# Patient Record
Sex: FEMALE | Race: WHITE | NOT HISPANIC OR LATINO | Employment: OTHER | URBAN - NONMETROPOLITAN AREA
[De-identification: names, ages, dates, MRNs, and addresses within clinical notes are randomized per-mention and may not be internally consistent; named-entity substitution may affect disease eponyms.]

---

## 2022-05-17 ENCOUNTER — OFFICE VISIT (OUTPATIENT)
Dept: FAMILY MEDICINE CLINIC | Facility: CLINIC | Age: 62
End: 2022-05-17

## 2022-05-17 VITALS
BODY MASS INDEX: 36.37 KG/M2 | DIASTOLIC BLOOD PRESSURE: 88 MMHG | WEIGHT: 213 LBS | HEIGHT: 64 IN | SYSTOLIC BLOOD PRESSURE: 142 MMHG

## 2022-05-17 DIAGNOSIS — Z85.3 HISTORY OF BILATERAL BREAST CANCER: ICD-10-CM

## 2022-05-17 DIAGNOSIS — Z79.4 TYPE 2 DIABETES MELLITUS WITH DIABETIC NEUROPATHY, WITH LONG-TERM CURRENT USE OF INSULIN: ICD-10-CM

## 2022-05-17 DIAGNOSIS — F33.1 MODERATE EPISODE OF RECURRENT MAJOR DEPRESSIVE DISORDER: ICD-10-CM

## 2022-05-17 DIAGNOSIS — Z79.4 TYPE 2 DIABETES MELLITUS WITH OTHER DIABETIC KIDNEY COMPLICATION, WITH LONG-TERM CURRENT USE OF INSULIN: ICD-10-CM

## 2022-05-17 DIAGNOSIS — E11.40 TYPE 2 DIABETES MELLITUS WITH DIABETIC NEUROPATHY, WITH LONG-TERM CURRENT USE OF INSULIN: ICD-10-CM

## 2022-05-17 DIAGNOSIS — E11.29 TYPE 2 DIABETES MELLITUS WITH OTHER DIABETIC KIDNEY COMPLICATION, WITH LONG-TERM CURRENT USE OF INSULIN: ICD-10-CM

## 2022-05-17 DIAGNOSIS — E55.9 VITAMIN D DEFICIENCY: ICD-10-CM

## 2022-05-17 DIAGNOSIS — I10 PRIMARY HYPERTENSION: ICD-10-CM

## 2022-05-17 DIAGNOSIS — T45.1X5A CHEMOTHERAPY-INDUCED NEUROPATHY: ICD-10-CM

## 2022-05-17 DIAGNOSIS — L50.9 HIVES: ICD-10-CM

## 2022-05-17 DIAGNOSIS — G62.0 CHEMOTHERAPY-INDUCED NEUROPATHY: ICD-10-CM

## 2022-05-17 DIAGNOSIS — Z00.00 ENCOUNTER FOR PREVENTATIVE ADULT HEALTH CARE EXAMINATION: Primary | ICD-10-CM

## 2022-05-17 DIAGNOSIS — E78.2 MIXED HYPERLIPIDEMIA: ICD-10-CM

## 2022-05-17 PROBLEM — E11.9 DIABETES MELLITUS: Status: ACTIVE | Noted: 2022-05-17

## 2022-05-17 PROCEDURE — 99396 PREV VISIT EST AGE 40-64: CPT | Performed by: FAMILY MEDICINE

## 2022-05-17 PROCEDURE — 36415 COLL VENOUS BLD VENIPUNCTURE: CPT | Performed by: FAMILY MEDICINE

## 2022-05-17 RX ORDER — ERGOCALCIFEROL 1.25 MG/1
50000 CAPSULE ORAL 2 TIMES WEEKLY
COMMUNITY
Start: 2022-04-27

## 2022-05-17 RX ORDER — DULAGLUTIDE 3 MG/.5ML
INJECTION, SOLUTION SUBCUTANEOUS
COMMUNITY
Start: 2022-03-28 | End: 2022-05-17

## 2022-05-17 RX ORDER — GABAPENTIN 400 MG/1
400 CAPSULE ORAL
COMMUNITY
Start: 2022-04-29 | End: 2022-05-24 | Stop reason: SDUPTHER

## 2022-05-17 RX ORDER — BLOOD SUGAR DIAGNOSTIC
STRIP MISCELLANEOUS
COMMUNITY
Start: 2022-04-29 | End: 2022-10-19 | Stop reason: SDUPTHER

## 2022-05-17 RX ORDER — DIAPER,BRIEF,INFANT-TODD,DISP
1 EACH MISCELLANEOUS 2 TIMES DAILY
Qty: 3 G | Refills: 0 | Status: SHIPPED | OUTPATIENT
Start: 2022-05-17 | End: 2022-08-24

## 2022-05-17 RX ORDER — EMPAGLIFLOZIN 25 MG/1
25 TABLET, FILM COATED ORAL DAILY
COMMUNITY
Start: 2022-04-29 | End: 2022-08-24

## 2022-05-17 RX ORDER — CYCLOBENZAPRINE HCL 10 MG
10 TABLET ORAL
COMMUNITY
Start: 2022-04-29 | End: 2022-07-05 | Stop reason: SDUPTHER

## 2022-05-17 RX ORDER — SEMAGLUTIDE 1.34 MG/ML
INJECTION, SOLUTION SUBCUTANEOUS
COMMUNITY
Start: 2022-05-04 | End: 2022-07-05 | Stop reason: SDUPTHER

## 2022-05-17 RX ORDER — PAROXETINE HYDROCHLORIDE 40 MG/1
40 TABLET, FILM COATED ORAL DAILY
COMMUNITY
Start: 2022-04-29 | End: 2022-07-05 | Stop reason: SDUPTHER

## 2022-05-17 RX ORDER — INSULIN DETEMIR 100 [IU]/ML
INJECTION, SOLUTION SUBCUTANEOUS
COMMUNITY
Start: 2022-04-29 | End: 2022-07-05 | Stop reason: SDUPTHER

## 2022-05-17 RX ORDER — CLONIDINE HYDROCHLORIDE 0.1 MG/1
TABLET ORAL
COMMUNITY
Start: 2022-04-29 | End: 2022-07-05 | Stop reason: SDUPTHER

## 2022-05-17 RX ORDER — CETIRIZINE HYDROCHLORIDE 10 MG/1
10 TABLET ORAL DAILY
Qty: 30 TABLET | Refills: 0 | Status: SHIPPED | OUTPATIENT
Start: 2022-05-17 | End: 2022-06-14

## 2022-05-17 RX ORDER — LANCETS 30 GAUGE
EACH MISCELLANEOUS
COMMUNITY
Start: 2022-04-29

## 2022-05-17 RX ORDER — AMLODIPINE BESYLATE AND BENAZEPRIL HYDROCHLORIDE 5; 40 MG/1; MG/1
CAPSULE ORAL
COMMUNITY
Start: 2022-04-29 | End: 2022-08-17 | Stop reason: SDUPTHER

## 2022-05-17 RX ORDER — PEN NEEDLE, DIABETIC 32GX 5/32"
NEEDLE, DISPOSABLE MISCELLANEOUS 2 TIMES DAILY
COMMUNITY
Start: 2022-03-27 | End: 2022-10-19 | Stop reason: SINTOL

## 2022-05-17 RX ORDER — INSULIN GLARGINE 100 [IU]/ML
INJECTION, SOLUTION SUBCUTANEOUS
COMMUNITY
Start: 2022-04-29 | End: 2022-05-17

## 2022-05-17 RX ORDER — EZETIMIBE 10 MG/1
10 TABLET ORAL
COMMUNITY
Start: 2022-04-29 | End: 2023-01-30 | Stop reason: SDUPTHER

## 2022-05-17 RX ORDER — CARBAMAZEPINE 200 MG/1
TABLET ORAL
COMMUNITY
Start: 2022-04-29 | End: 2022-07-05 | Stop reason: SDUPTHER

## 2022-05-17 RX ORDER — ATORVASTATIN CALCIUM 80 MG/1
80 TABLET, FILM COATED ORAL DAILY
COMMUNITY
Start: 2022-04-29 | End: 2022-10-07 | Stop reason: SDUPTHER

## 2022-05-17 NOTE — PROGRESS NOTES
"Chief Complaint  Annual Exam and hives    Subjective          Tara Perez presents to [facility] for preventative yearly exam.   Patient is a 61-year-old female who presents to \[facility]\"" care.    She had a history of breast cancer about 7 years ago had a double mastectomy status post chemo.  Due to this she had chemo induced neuropathy along with diabetic neuropathy for which she is on the carbamazepine plus the gabapentin.    In regards to her type 2 diabetes she has had to have her insulin changed over currently on Levemir 30 units and 60 units at night.  She is also on the Ozempic along with Jardiance.  Her last A1c was above 8.  Daily blood glucose around 180.    She also states over the past week she has had a rash that broke on the left upper extremity denies any new medications new contacts no detergents or soaps no other known triggers.  The rash has been treated with over-the-counter medications with no improvement.    She has a history of essential hypertension stable on medications no history of cardiac abnormalities never requiring echo or stress test no chest pain or shortness of breath.      Objective   Vital Signs:   /88   Ht 162.6 cm (64\")   Wt 96.6 kg (213 lb)   BMI 36.56 kg/m²     Body mass index is 36.56 kg/m².    Predictive Model Details   No score data available for Risk of Fall        PHQ-9 Depression Screening  Little interest or pleasure in doing things? 0-->not at all   Feeling down, depressed, or hopeless? 0-->not at all   Trouble falling or staying asleep, or sleeping too much? 0-->not at all   Feeling tired or having little energy? 0-->not at all   Poor appetite or overeating? 0-->not at all   Feeling bad about yourself - or that you are a failure or have let yourself or your family down? 0-->not at all   Trouble concentrating on things, such as reading the newspaper or watching television? 0-->not at all   Moving or speaking so slowly that other " people could have noticed? Or the opposite - being so fidgety or restless that you have been moving around a lot more than usual? 0-->not at all   Thoughts that you would be better off dead, or of hurting yourself in some way? 0-->not at all   PHQ-9 Total Score 0   If you checked off any problems, how difficult have these problems made it for you to do your work, take care of things at home, or get along with other people? not difficult at all     Immunization History   Administered Date(s) Administered   • COVID-19 (MODERNA) 1st, 2nd, 3rd Dose Only 04/20/2021, 05/18/2021, 11/03/2021, 04/01/2022       Review of Systems   Constitutional: Negative.    HENT: Negative.    Eyes: Negative.    Respiratory: Negative.    Cardiovascular: Negative.    Gastrointestinal: Negative.    Endocrine: Negative.    Genitourinary: Negative.    Musculoskeletal: Negative.    Skin: Positive for rash.   Allergic/Immunologic: Negative.    Neurological: Negative.    Hematological: Negative.    Psychiatric/Behavioral: Negative.        Past History:  Medical History: has no past medical history on file.   Surgical History: has no past surgical history on file.   Family History: family history is not on file.   Social History: reports that she quit smoking about 30 years ago. She started smoking about 47 years ago. She has a 17.00 pack-year smoking history. She does not have any smokeless tobacco history on file. She reports previous alcohol use. She reports that she does not use drugs.      Current Outpatient Medications:   •  amLODIPine-benazepril (LOTREL) 5-40 MG per capsule, TAKE 1 CAPSULE BY MOUTH ONCE DAILY FOR BLOOD PRESSURE, Disp: , Rfl:   •  atorvastatin (LIPITOR) 80 MG tablet, Take 80 mg by mouth Daily., Disp: , Rfl:   •  BD Pen Needle Lisa U/F 32G X 4 MM misc, 2 (Two) Times a Day. as directed, Disp: , Rfl:   •  carBAMazepine (TEGretol) 200 MG tablet, TAKE 1 & 1/2 TABLETS BY MOUTH TWICE DAILY, Disp: , Rfl:   •  cloNIDine (CATAPRES) 0.1  MG tablet, TAKE 1 TABLET BY MOUTH EVERY NIGHT AT BEDTIME FOR HOT FLASHED, Disp: , Rfl:   •  cyclobenzaprine (FLEXERIL) 10 MG tablet, Take 10 mg by mouth every night at bedtime., Disp: , Rfl:   •  Easy Touch Lancets 30G/Twist misc, USE AS DIRECTED TO TEST BLOOD SUGAR 3 TIMES DAILY, Disp: , Rfl:   •  ezetimibe (ZETIA) 10 MG tablet, Take 10 mg by mouth every night at bedtime., Disp: , Rfl:   •  gabapentin (NEURONTIN) 400 MG capsule, Take 400 mg by mouth every night at bedtime., Disp: , Rfl:   •  Levemir FlexTouch 100 UNIT/ML injection, INJECT 30 UNITS SUBCUTANEOUSLY EVERY MORNING AND 60 UNITS EVERY EVENING, Disp: , Rfl:   •  OneTouch Ultra test strip, TEST BLOOD SUGAR 3 TIMES DAILY, Disp: , Rfl:   •  Ozempic, 1 MG/DOSE, 4 MG/3ML solution pen-injector, INJECT 1MG SUBCUTANEOUSLY EVERY WEEK, Disp: , Rfl:   •  PARoxetine (PAXIL) 40 MG tablet, Take 40 mg by mouth Daily., Disp: , Rfl:   •  vitamin D (ERGOCALCIFEROL) 1.25 MG (93193 UT) capsule capsule, Take 50,000 Units by mouth 2 (Two) Times a Week., Disp: , Rfl:   •  cetirizine (zyrTEC) 10 MG tablet, Take 1 tablet by mouth Daily., Disp: 30 tablet, Rfl: 0  •  empagliflozin (Jardiance) 25 MG tablet tablet, Take 25 mg by mouth Daily., Disp: , Rfl:   •  hydrocortisone 0.5 % ointment, Apply 1 application topically to the appropriate area as directed 2 (Two) Times a Day., Disp: 3 g, Rfl: 0  •  Jardiance 25 MG tablet tablet, Take 25 mg by mouth Daily., Disp: , Rfl:     Allergies: Patient has no known allergies.    Physical Exam  Constitutional:       Appearance: Normal appearance. She is normal weight.   HENT:      Head: Normocephalic and atraumatic.   Eyes:      Conjunctiva/sclera: Conjunctivae normal.   Cardiovascular:      Rate and Rhythm: Normal rate and regular rhythm.   Pulmonary:      Effort: Pulmonary effort is normal.      Breath sounds: Normal breath sounds.   Musculoskeletal:         General: Normal range of motion.      Cervical back: Normal range of motion and neck  supple.   Skin:     Comments: There is a dry scattered erythematous contact dermatitis appearing rash on the left upper extremity into the left back no evidence of drainage or pustules.   Neurological:      General: No focal deficit present.      Mental Status: She is alert and oriented to person, place, and time. Mental status is at baseline.   Psychiatric:         Mood and Affect: Mood normal.         Behavior: Behavior normal.         Thought Content: Thought content normal.         Judgment: Judgment normal.          Result Review :              Counseling/anticipatory guidance:  Patient has been counseled on nutrition, Family planning/contraception, physical activity, health and weight, injury prevention, misuse of tobacco, alcohol and drugs, sexual behavior, dental health, mental health, immunizations and screening.  Patient has been given counseling and guidance on the importance of breast cancer and self breast exams.     Assessment and Plan    Diagnoses and all orders for this visit:    1. Encounter for preventative adult health care examination (Primary)  -     Hemoglobin A1c; Future  -     Comprehensive Metabolic Panel; Future  -     CK; Future  -     CBC Auto Differential; Future  -     TSH; Future  -     T4, Free; Future  -     Lipid Panel; Future  -     Vitamin D 25 Hydroxy; Future  -     Vitamin D 25 Hydroxy  -     Lipid Panel  -     T4, Free  -     TSH  -     CBC Auto Differential  -     CK  -     Comprehensive Metabolic Panel  -     Hemoglobin A1c  We will get basic blood work and follow-up she denies any colonoscopy or Cologuard.      2. Primary hypertension  Stable continue meds and monitoring    3. Moderate episode of recurrent major depressive disorder (HCC)  Stable continue meds and monitoring    4. Vitamin D deficiency  -     Vitamin D 25 Hydroxy; Future  -     Vitamin D 25 Hydroxy  Stable continue meds and monitoring    5. Mixed hyperlipidemia  We will get basic lab work and follow-up    6.  Type 2 diabetes mellitus with other diabetic kidney complication, with long-term current use of insulin (HCC)  Stable continue meds monitoring  We will get hemoglobin A1c and follow-up next week modify her regimen based on her results    7. Type 2 diabetes mellitus with diabetic neuropathy, with long-term current use of insulin (HCC)  Stable on the gabapentin and carbamazepine    8. Hives  Appears to be hives we will go ahead and do antihistamine with Zyrtec and topical hydrocortisone cream we will try to avoid prednisone due to her history of diabetes we will have her continue to monitor of course if it gets worse return for evaluation    9. History of bilateral breast cancer  -     Ambulatory Referral to Hematology / Oncology  She just moved from [place] needs to get reestablished with an oncologist for her history of breast cancer we will make referral    10. Chemotherapy-induced neuropathy (HCC)    Other orders  -     cetirizine (zyrTEC) 10 MG tablet; Take 1 tablet by mouth Daily.  Dispense: 30 tablet; Refill: 0  -     hydrocortisone 0.5 % ointment; Apply 1 application topically to the appropriate area as directed 2 (Two) Times a Day.  Dispense: 3 g; Refill: 0        MEDICATION SIDE EFFECTS, RISKS AND SIDE EFFECTS HAVE BEEN DISCUSSED WITH PATIENT. PATIENT NOTES UNDERSTANDING. IF ANY CONCERN OR QUESTION REGARDING MEDICATION PLEASE CONTACT.         Follow Up   No follow-ups on file.  Patient was given instructions and counseling regarding her condition or for health maintenance advice. Please see specific information pulled into the AVS if appropriate.     Naomi Sandy DO

## 2022-05-18 LAB
25(OH)D3+25(OH)D2 SERPL-MCNC: 74.4 NG/ML (ref 30–100)
ALBUMIN SERPL-MCNC: 4.3 G/DL (ref 3.8–4.9)
ALBUMIN/GLOB SERPL: 1.6 {RATIO} (ref 1.2–2.2)
ALP SERPL-CCNC: 112 IU/L (ref 44–121)
ALT SERPL-CCNC: 12 IU/L (ref 0–32)
AST SERPL-CCNC: 23 IU/L (ref 0–40)
BASOPHILS # BLD AUTO: 0.1 X10E3/UL (ref 0–0.2)
BASOPHILS NFR BLD AUTO: 1 %
BILIRUB SERPL-MCNC: 0.2 MG/DL (ref 0–1.2)
BUN SERPL-MCNC: 6 MG/DL (ref 8–27)
BUN/CREAT SERPL: 8 (ref 12–28)
CALCIUM SERPL-MCNC: 8.7 MG/DL (ref 8.7–10.3)
CHLORIDE SERPL-SCNC: 102 MMOL/L (ref 96–106)
CHOLEST SERPL-MCNC: 153 MG/DL (ref 100–199)
CK SERPL-CCNC: 115 U/L (ref 32–182)
CO2 SERPL-SCNC: 23 MMOL/L (ref 20–29)
CREAT SERPL-MCNC: 0.77 MG/DL (ref 0.57–1)
EGFRCR SERPLBLD CKD-EPI 2021: 88 ML/MIN/1.73
EOSINOPHIL # BLD AUTO: 0.5 X10E3/UL (ref 0–0.4)
EOSINOPHIL NFR BLD AUTO: 10 %
ERYTHROCYTE [DISTWIDTH] IN BLOOD BY AUTOMATED COUNT: 13 % (ref 11.7–15.4)
GLOBULIN SER CALC-MCNC: 2.7 G/DL (ref 1.5–4.5)
GLUCOSE SERPL-MCNC: 95 MG/DL (ref 65–99)
HBA1C MFR BLD: 7.3 % (ref 4.8–5.6)
HCT VFR BLD AUTO: 43.2 % (ref 34–46.6)
HDLC SERPL-MCNC: 51 MG/DL
HGB BLD-MCNC: 14.3 G/DL (ref 11.1–15.9)
IMM GRANULOCYTES # BLD AUTO: 0 X10E3/UL (ref 0–0.1)
IMM GRANULOCYTES NFR BLD AUTO: 0 %
LDLC SERPL CALC-MCNC: 77 MG/DL (ref 0–99)
LYMPHOCYTES # BLD AUTO: 1.8 X10E3/UL (ref 0.7–3.1)
LYMPHOCYTES NFR BLD AUTO: 35 %
MCH RBC QN AUTO: 30.6 PG (ref 26.6–33)
MCHC RBC AUTO-ENTMCNC: 33.1 G/DL (ref 31.5–35.7)
MCV RBC AUTO: 92 FL (ref 79–97)
MONOCYTES # BLD AUTO: 0.3 X10E3/UL (ref 0.1–0.9)
MONOCYTES NFR BLD AUTO: 6 %
NEUTROPHILS # BLD AUTO: 2.5 X10E3/UL (ref 1.4–7)
NEUTROPHILS NFR BLD AUTO: 48 %
PLATELET # BLD AUTO: 231 X10E3/UL (ref 150–450)
POTASSIUM SERPL-SCNC: 3.7 MMOL/L (ref 3.5–5.2)
PROT SERPL-MCNC: 7 G/DL (ref 6–8.5)
RBC # BLD AUTO: 4.68 X10E6/UL (ref 3.77–5.28)
SODIUM SERPL-SCNC: 140 MMOL/L (ref 134–144)
T4 FREE SERPL-MCNC: 0.68 NG/DL (ref 0.82–1.77)
TRIGL SERPL-MCNC: 147 MG/DL (ref 0–149)
TSH SERPL DL<=0.005 MIU/L-ACNC: 0.8 UIU/ML (ref 0.45–4.5)
VLDLC SERPL CALC-MCNC: 25 MG/DL (ref 5–40)
WBC # BLD AUTO: 5.2 X10E3/UL (ref 3.4–10.8)

## 2022-05-24 ENCOUNTER — OFFICE VISIT (OUTPATIENT)
Dept: FAMILY MEDICINE CLINIC | Facility: CLINIC | Age: 62
End: 2022-05-24

## 2022-05-24 VITALS
WEIGHT: 206.9 LBS | DIASTOLIC BLOOD PRESSURE: 90 MMHG | SYSTOLIC BLOOD PRESSURE: 138 MMHG | BODY MASS INDEX: 35.32 KG/M2 | HEIGHT: 64 IN | OXYGEN SATURATION: 97 % | HEART RATE: 83 BPM

## 2022-05-24 DIAGNOSIS — T45.1X5A CHEMOTHERAPY-INDUCED NEUROPATHY: ICD-10-CM

## 2022-05-24 DIAGNOSIS — G62.0 CHEMOTHERAPY-INDUCED NEUROPATHY: ICD-10-CM

## 2022-05-24 DIAGNOSIS — Z85.3 HISTORY OF BILATERAL BREAST CANCER: ICD-10-CM

## 2022-05-24 DIAGNOSIS — E11.40 TYPE 2 DIABETES MELLITUS WITH DIABETIC NEUROPATHY, WITH LONG-TERM CURRENT USE OF INSULIN: Primary | ICD-10-CM

## 2022-05-24 DIAGNOSIS — Z79.4 TYPE 2 DIABETES MELLITUS WITH DIABETIC NEUROPATHY, WITH LONG-TERM CURRENT USE OF INSULIN: Primary | ICD-10-CM

## 2022-05-24 PROCEDURE — 99213 OFFICE O/P EST LOW 20 MIN: CPT | Performed by: FAMILY MEDICINE

## 2022-05-24 RX ORDER — HYDROCORTISONE 1 G/100G
CREAM TOPICAL
COMMUNITY
Start: 2022-05-17 | End: 2022-08-24

## 2022-05-24 RX ORDER — GABAPENTIN 400 MG/1
400 CAPSULE ORAL
Qty: 30 CAPSULE | Refills: 2 | Status: SHIPPED | OUTPATIENT
Start: 2022-05-24 | End: 2022-09-09

## 2022-05-24 NOTE — PROGRESS NOTES
"Chief Complaint  Med Refill    Subjective          Tara Perez presents to [facility]  Patient is a 61-year-old female with past medical history of type 2 insulin-dependent diabetes follow-up on blood work prior to this blood work she noted her hemoglobin A1c was up to 8.  Her current regimen is 60 units of Levemir at night and 30 units in the daytime along with Jardiance and Ozempic.  Blood glucose checks average around 1 50-1 60.  She is stable on her gabapentin for her diabetic neuropathy she also has chemotherapy-induced neuropathy for her history of breast cancer.      Objective   Vital Signs:   /90 (BP Location: Right arm, Patient Position: Sitting, Cuff Size: Large Adult)   Pulse 83   Ht 162.6 cm (64\")   Wt 93.8 kg (206 lb 14.4 oz)   SpO2 97%   BMI 35.51 kg/m²     Body mass index is 35.51 kg/m².    Review of Systems   Constitutional: Negative.    HENT: Negative.    Eyes: Negative.    Respiratory: Negative.    Cardiovascular: Negative.    Gastrointestinal: Negative.    Endocrine: Negative.    Genitourinary: Negative.    Musculoskeletal: Negative.    Skin: Negative.    Allergic/Immunologic: Negative.    Neurological: Negative.    Hematological: Negative.    Psychiatric/Behavioral: Negative.        Past History:  Medical History: has no past medical history on file.   Surgical History: has no past surgical history on file.   Family History: family history is not on file.   Social History: reports that she quit smoking about 30 years ago. Her smoking use included cigarettes. She started smoking about 47 years ago. She has a 17.00 pack-year smoking history. She has never used smokeless tobacco. She reports previous alcohol use. She reports that she does not use drugs.      Current Outpatient Medications:   •  amLODIPine-benazepril (LOTREL) 5-40 MG per capsule, TAKE 1 CAPSULE BY MOUTH ONCE DAILY FOR BLOOD PRESSURE, Disp: , Rfl:   •  atorvastatin (LIPITOR) 80 MG tablet, " Take 80 mg by mouth Daily., Disp: , Rfl:   •  BD Pen Needle Lisa U/F 32G X 4 MM misc, 2 (Two) Times a Day. as directed, Disp: , Rfl:   •  carBAMazepine (TEGretol) 200 MG tablet, TAKE 1 & 1/2 TABLETS BY MOUTH TWICE DAILY, Disp: , Rfl:   •  cetirizine (zyrTEC) 10 MG tablet, Take 1 tablet by mouth Daily., Disp: 30 tablet, Rfl: 0  •  cloNIDine (CATAPRES) 0.1 MG tablet, TAKE 1 TABLET BY MOUTH EVERY NIGHT AT BEDTIME FOR HOT FLASHED, Disp: , Rfl:   •  cyclobenzaprine (FLEXERIL) 10 MG tablet, Take 10 mg by mouth every night at bedtime., Disp: , Rfl:   •  Easy Touch Lancets 30G/Twist misc, USE AS DIRECTED TO TEST BLOOD SUGAR 3 TIMES DAILY, Disp: , Rfl:   •  empagliflozin (JARDIANCE) 25 MG tablet tablet, Take 25 mg by mouth Daily., Disp: , Rfl:   •  ezetimibe (ZETIA) 10 MG tablet, Take 10 mg by mouth every night at bedtime., Disp: , Rfl:   •  gabapentin (NEURONTIN) 400 MG capsule, Take 1 capsule by mouth every night at bedtime., Disp: 30 capsule, Rfl: 2  •  HM Hydrocortisone Plus 1 % cream, , Disp: , Rfl:   •  hydrocortisone 0.5 % ointment, Apply 1 application topically to the appropriate area as directed 2 (Two) Times a Day., Disp: 3 g, Rfl: 0  •  Jardiance 25 MG tablet tablet, Take 25 mg by mouth Daily., Disp: , Rfl:   •  Levemir FlexTouch 100 UNIT/ML injection, INJECT 30 UNITS SUBCUTANEOUSLY EVERY MORNING AND 60 UNITS EVERY EVENING, Disp: , Rfl:   •  OneTouch Ultra test strip, TEST BLOOD SUGAR 3 TIMES DAILY, Disp: , Rfl:   •  Ozempic, 1 MG/DOSE, 4 MG/3ML solution pen-injector, INJECT 1MG SUBCUTANEOUSLY EVERY WEEK, Disp: , Rfl:   •  PARoxetine (PAXIL) 40 MG tablet, Take 40 mg by mouth Daily., Disp: , Rfl:   •  vitamin D (ERGOCALCIFEROL) 1.25 MG (31433 UT) capsule capsule, Take 50,000 Units by mouth 2 (Two) Times a Week., Disp: , Rfl:     Allergies: Penicillins    Physical Exam  Constitutional:       Appearance: Normal appearance. She is normal weight.   HENT:      Head: Normocephalic and atraumatic.   Eyes:       Conjunctiva/sclera: Conjunctivae normal.   Cardiovascular:      Rate and Rhythm: Normal rate and regular rhythm.   Pulmonary:      Effort: Pulmonary effort is normal.      Breath sounds: Normal breath sounds.   Musculoskeletal:         General: Normal range of motion.      Cervical back: Normal range of motion and neck supple.   Skin:     General: Skin is warm and dry.   Neurological:      General: No focal deficit present.      Mental Status: She is alert and oriented to person, place, and time. Mental status is at baseline.   Psychiatric:         Mood and Affect: Mood normal.         Behavior: Behavior normal.         Thought Content: Thought content normal.         Judgment: Judgment normal.          Result Review :                   Assessment and Plan    Diagnoses and all orders for this visit:    1. Type 2 diabetes mellitus with diabetic neuropathy, with long-term current use of insulin ([facility]) (Primary)  Her hemoglobin A1c is great at 7.3 has come down from 8 we will have her continue her current insulin regimen along with the Ozempic and Jardiance have her come back in 3 months of course recommend dietary changes and exercise she is stable on her current dose of the gabapentin up-to-date [facility] contract follow-up in 3 months      2. History of bilateral breast cancer  Pending referral to get established with an oncologist here    MEDICATION SIDE EFFECTS, RISKS AND SIDE EFFECTS HAVE BEEN DISCUSSED WITH PATIENT. PATIENT NOTES UNDERSTANDING. IF ANY CONCERN OR QUESTION REGARDING MEDICATION PLEASE CONTACT.         Follow Up   No follow-ups on file.  Patient was given instructions and counseling regarding her condition or for health maintenance advice. Please see specific information pulled into the AVS if appropriate.     Naomi Sandy DO

## 2022-06-14 RX ORDER — CETIRIZINE HYDROCHLORIDE 10 MG/1
TABLET ORAL
Qty: 30 TABLET | Refills: 0 | Status: SHIPPED | OUTPATIENT
Start: 2022-06-14 | End: 2022-06-30

## 2022-06-14 NOTE — TELEPHONE ENCOUNTER
Rx Refill Note    Requested Prescriptions     Pending Prescriptions Disp Refills   • cetirizine (zyrTEC) 10 MG tablet [Pharmacy Med Name: CETIRIZINE HCL 10 MG TABLET] 30 tablet 0     Sig: TAKE ONE TABLET BY MOUTH EVERY DAY        Last office visit with prescribing clinician: 5/24/2022      Next office visit with prescribing clinician: 8/24/2022   Last labs:   Last refill: 05/17/22   Pharmacy (be sure to add in Epic). correct

## 2022-06-30 DIAGNOSIS — J30.9 ALLERGIC RHINITIS, UNSPECIFIED SEASONALITY, UNSPECIFIED TRIGGER: Primary | ICD-10-CM

## 2022-07-01 DIAGNOSIS — T14.8XXA MUSCLE STRAIN: ICD-10-CM

## 2022-07-01 DIAGNOSIS — E11.65 TYPE 2 DIABETES MELLITUS WITH HYPERGLYCEMIA, WITHOUT LONG-TERM CURRENT USE OF INSULIN: ICD-10-CM

## 2022-07-01 DIAGNOSIS — F33.41 MAJOR DEPRESSIVE DISORDER, RECURRENT EPISODE, IN PARTIAL REMISSION: Primary | ICD-10-CM

## 2022-07-01 DIAGNOSIS — I10 ESSENTIAL HYPERTENSION, BENIGN: ICD-10-CM

## 2022-07-01 NOTE — TELEPHONE ENCOUNTER
Patient is requesting a refill for:    Carbamazepine 200 mg  Paroxetine 40 mg  Clonidine 0.1 mg  Insulin pens  Cyclobenzaprine 10 mg    Please send to [facility].    Patient says she is out of most of these.  Does she need an appt to get refills since she was just here in May?   She has a 3 mo f/u scheduled already.

## 2022-07-05 RX ORDER — CETIRIZINE HYDROCHLORIDE 10 MG/1
TABLET ORAL
Qty: 30 TABLET | Refills: 1 | Status: SHIPPED | OUTPATIENT
Start: 2022-07-05 | End: 2022-08-30

## 2022-07-12 ENCOUNTER — CONSULT (OUTPATIENT)
Dept: ONCOLOGY | Facility: CLINIC | Age: 62
End: 2022-07-12

## 2022-07-12 VITALS
BODY MASS INDEX: 33.46 KG/M2 | HEIGHT: 64 IN | SYSTOLIC BLOOD PRESSURE: 119 MMHG | WEIGHT: 196 LBS | HEART RATE: 90 BPM | RESPIRATION RATE: 18 BRPM | TEMPERATURE: 97.8 F | OXYGEN SATURATION: 100 % | DIASTOLIC BLOOD PRESSURE: 72 MMHG

## 2022-07-12 DIAGNOSIS — Z85.3 HISTORY OF BILATERAL BREAST CANCER: Primary | ICD-10-CM

## 2022-07-12 PROCEDURE — 99204 OFFICE O/P NEW MOD 45 MIN: CPT | Performed by: INTERNAL MEDICINE

## 2022-07-12 NOTE — PROGRESS NOTES
CHIEF COMPLAINT: No new somatic complaints    REASON FOR REFERRAL: Nothing to suggest metastasis clinically      RECORDS OBTAINED  Records of the patients history including those obtained from prior medical oncologist records were reviewed and summarized in detail.    Oncology/Hematology History Overview Note   1.  History of bilateral breast cancer  2.  Hypertension  3.  Diabetes  4.  Hyperlipidemia  5.  Depression    Oncology history timeline:    -11/23/2021 office note from Dr.Vidya Zaidi indicates right breast T1BN0 low-grade ductal carcinoma estrogen receptor positive on Femara.  Left breast 12:00 and 7:00 hormone receptor positive T1CN0 adenocarcinoma.  Bilateral mastectomy.  Received dose dense AC followed by Taxol ending December 2014.  HER2/chitra negative.  ER and KS positive.  Hot flashes managed with clonidine 0.1 mg nightly.  Intolerant of aromatase inhibitor.  Plan for annual follow-up CBC, CMP and clinical exam.  -5/17/2022 CMP and CBC unremarkable    -7/12/2022 [facility] hematology oncology initial consultation: I saw her for the first time today.    I reviewed 5/20/2014 biopsy pathology report which reveals...  ·  right breast low-grade invasive ductal carcinoma grade 1 Stanley Osman 5 out of 9.  ER 3+99%, KS 3+ 99%, HER2/chitra 1+ negative, Ki-67 borderline 3+12%.   ·  Left breast mass 7:00 invasive ductal grade 2, [place] score 6 out of 9.  ER 3+100%, KS 13+ 100%, HER2/chitra equivocal negative on FISH.    · Left breast 9:00 low-grade invasive ductal carcinoma grade 1 [place] score 5 out of 9.  ER 3+90% KS 3+100%   HER2/chitra negative    I reviewed her 6/5/2014 bilateral mastectomy pathology report...  · right breast simple mastectomy showed intermediate grade invasive ductal carcinoma 1 cm grade 2 score 6 out of 9.  1 sentinel node negative.  No lymph vascular invasion.  Pathologic T1BN0  · Left breast mastectomy 2 foci.  1.5 cm and 1.2 cm.  Grade 2.  Score 6 out of 9.  Margins negative.  1  sentinel node and 2 additional nodes taken all negative.  Pathologic T1CN0   Her CBC and CMP from May were unremarkable.  She confirms that she received dose dense AC followed by Taxol and then approximately 6 years of Femara which she quit in 2021 and now lives in [place].  She does not recall having had Oncotype.  Bilateral chest wall exam today is benign.  Routine follow-up at this point can be bilateral chest wall clinical examination and perhaps a CBC to look for late complications of chemotherapy including secondary malignancies.  This does not necessarily have to be done by a medical oncologist as a primary care doctor can examine her chest wall as a part of their routine physical and they can do her labs but she would desire to continue to follow with us as she was doing with her prior medical oncologist.  As breast cancer was multifocal in the left breast and present in the contralateral breast, I will offer her genetic counseling though obviously she has already had bilateral mastectomies.  With small node-negative disease I would not put her through breast cancer index testing and would not give her extended adjuvant hormone blockade.  She has been off of hormone blockade since the latter part of 2021.     History of bilateral breast cancer       HISTORY OF PRESENT ILLNESS:  The patient is a 61 y.o.  female, referred for follow-up of history of bilateral breast cancer.    REVIEW OF SYSTEMS:  No somatic complaints to suggest metastasis    History reviewed. No pertinent past medical history.  History reviewed. No pertinent surgical history.    Current Outpatient Medications on File Prior to Visit   Medication Sig Dispense Refill   • amLODIPine-benazepril (LOTREL) 5-40 MG per capsule TAKE 1 CAPSULE BY MOUTH ONCE DAILY FOR BLOOD PRESSURE     • atorvastatin (LIPITOR) 80 MG tablet Take 80 mg by mouth Daily.     • BD Pen Needle Lisa U/F 32G X 4 MM misc 2 (Two) Times a Day. as directed     • carBAMazepine  (TEGretol) 200 MG tablet TAKE 1 & 1/2 TABLETS BY MOUTH TWICE DAILY     • cetirizine (zyrTEC) 10 MG tablet TAKE ONE TABLET BY MOUTH EVERY DAY 30 tablet 1   • cloNIDine (CATAPRES) 0.1 MG tablet TAKE 1 TABLET BY MOUTH EVERY NIGHT AT BEDTIME FOR HOT FLASHED     • cyclobenzaprine (FLEXERIL) 10 MG tablet Take 10 mg by mouth every night at bedtime.     • Easy Touch Lancets 30G/Twist misc USE AS DIRECTED TO TEST BLOOD SUGAR 3 TIMES DAILY     • empagliflozin (JARDIANCE) 25 MG tablet tablet Take 25 mg by mouth Daily.     • ezetimibe (ZETIA) 10 MG tablet Take 10 mg by mouth every night at bedtime.     • gabapentin (NEURONTIN) 400 MG capsule Take 1 capsule by mouth every night at bedtime. 30 capsule 2   • HM Hydrocortisone Plus 1 % cream      • hydrocortisone 0.5 % ointment Apply 1 application topically to the appropriate area as directed 2 (Two) Times a Day. 3 g 0   • Jardiance 25 MG tablet tablet Take 25 mg by mouth Daily.     • Levemir FlexTouch 100 UNIT/ML injection INJECT 30 UNITS SUBCUTANEOUSLY EVERY MORNING AND 60 UNITS EVERY EVENING     • OneTouch Ultra test strip TEST BLOOD SUGAR 3 TIMES DAILY     • Ozempic, 1 MG/DOSE, 4 MG/3ML solution pen-injector INJECT 1MG SUBCUTANEOUSLY EVERY WEEK     • PARoxetine (PAXIL) 40 MG tablet Take 40 mg by mouth Daily.     • vitamin D (ERGOCALCIFEROL) 1.25 MG (12559 UT) capsule capsule Take 50,000 Units by mouth 2 (Two) Times a Week.       No current facility-administered medications on file prior to visit.       Allergies   Allergen Reactions   • Penicillins Rash       Social History     Socioeconomic History   • Marital status:    Tobacco Use   • Smoking status: Former Smoker     Packs/day: 1.00     Years: 17.00     Pack years: 17.00     Types: Cigarettes     Start date:      Quit date:      Years since quittin.5   • Smokeless tobacco: Never Used   Substance and Sexual Activity   • Alcohol use: Not Currently   • Drug use: Never   • Sexual activity: Not Currently      "Partners: Male       History reviewed. No pertinent family history.    PHYSICAL EXAM:  Bilateral chest wall exam with scar from bilateral mastectomy benign and no palpable adenopathy    /72   Pulse 90   Temp 97.8 °F (36.6 °C)   Resp 18   Ht 162.6 cm (64\")   Wt 88.9 kg (196 lb)   SpO2 100%   BMI 33.64 kg/m²     ECOG score: 0           ECOG: (0) Fully Active - Able to Carry On All Pre-disease Performance Without Restriction    Lab Results   Component Value Date    HGB 14.3 05/17/2022    HCT 43.2 05/17/2022    MCV 92 05/17/2022     05/17/2022    WBC 5.2 05/17/2022    NEUTROABS 2.5 05/17/2022    LYMPHSABS 1.8 05/17/2022    MONOSABS 0.3 05/17/2022    EOSABS 0.5 (H) 05/17/2022    BASOSABS 0.1 05/17/2022     Lab Results   Component Value Date    GLUCOSE 95 05/17/2022    BUN 6 (L) 05/17/2022    CREATININE 0.77 05/17/2022     05/17/2022    K 3.7 05/17/2022     05/17/2022    CO2 23 05/17/2022    CALCIUM 8.7 05/17/2022    ALBUMIN 4.3 05/17/2022    BILITOT 0.2 05/17/2022    ALKPHOS 112 05/17/2022    AST 23 05/17/2022    ALT 12 05/17/2022         Assessment & Plan   1.  History of bilateral breast cancer  2.  Hypertension  3.  Diabetes  4.  Hyperlipidemia  5.  Depression      Oncology history timeline:    -11/23/2021 office note from Dr.Vidya Zaidi indicates right breast T1BN0 low-grade ductal carcinoma estrogen receptor positive on Femara.  Left breast 12:00 and 7:00 hormone receptor positive T1CN0 adenocarcinoma.  Bilateral mastectomy.  Received dose dense AC followed by Taxol ending December 2014.  HER2/chitra negative.  ER and NC positive.  Hot flashes managed with clonidine 0.1 mg nightly.  Intolerant of aromatase inhibitor.  Plan for annual follow-up CBC, CMP and clinical exam.  -5/17/2022 CMP and CBC unremarkable    -7/12/2022 [facility] hematology oncology initial consultation: I saw her for the first time today.    I reviewed 5/20/2014 biopsy pathology report which reveals...  ·  right " breast low-grade invasive ductal carcinoma grade 1 Stanley Osman 5 out of 9.  ER 3+99%, ME 3+ 99%, HER2/chitra 1+ negative, Ki-67 borderline 3+12%.   ·  Left breast mass 7:00 invasive ductal grade 2, [place] score 6 out of 9.  ER 3+100%, ME 13+ 100%, HER2/chitra equivocal negative on FISH.    · Left breast 9:00 low-grade invasive ductal carcinoma grade 1 [place] score 5 out of 9.  ER 3+90% ME 3+100%   HER2/chitra negative    I reviewed her 6/5/2014 bilateral mastectomy pathology report...  · right breast simple mastectomy showed intermediate grade invasive ductal carcinoma 1 cm grade 2 score 6 out of 9.  1 sentinel node negative.  No lymph vascular invasion.  Pathologic T1BN0  · Left breast mastectomy 2 foci.  1.5 cm and 1.2 cm.  Grade 2.  Score 6 out of 9.  Margins negative.  1 sentinel node and 2 additional nodes taken all negative.  Pathologic T1CN0   Her CBC and CMP from May were unremarkable.  She confirms that she received dose dense AC followed by Taxol and then approximately 6 years of Femara which she quit in 2021 and now lives in [place].  She does not recall having had Oncotype.  Bilateral chest wall exam today is benign.  Routine follow-up at this point can be bilateral chest wall clinical examination and perhaps a CBC to look for late complications of chemotherapy including secondary malignancies.  This does not necessarily have to be done by a medical oncologist as a primary care doctor can examine her chest wall as a part of their routine physical and they can do her labs but she would desire to continue to follow with us as she was doing with her prior medical oncologist.  As breast cancer was multifocal in the left breast and present in the contralateral breast, I will offer her genetic counseling though obviously she has already had bilateral mastectomies.  With small node-negative disease I would not put her through breast cancer index testing and would not give her extended adjuvant hormone  blockade.  She has been off of hormone blockade since the latter part of 2021.    Total time of care today inclusive of time spent today prior to patient's arrival reviewing extensive past medical records and cataloging data as outlined above and during visit translating this and putting forth the plan as outlined above and after visit instituting this plan as outlined took 45 minutes of patient care time throughout the day today.    Gustabo Boggs MD    7/12/2022

## 2022-07-18 ENCOUNTER — TELEPHONE (OUTPATIENT)
Dept: FAMILY MEDICINE CLINIC | Facility: CLINIC | Age: 62
End: 2022-07-18

## 2022-07-18 NOTE — TELEPHONE ENCOUNTER
Caller: Tara Perez    Relationship: Self    Best call back number: [phone]    Requested Prescriptions:   Requested Prescriptions      No prescriptions requested or ordered in this encounter        Pharmacy where request should be sent: [address] - [phone]  - [phone] FX     Additional details provided by patient:     OUT OF AT LEAST FIVE MEDICATIONS NOT INCLUDING INSULIN.  NOT SURE WHY SHE CANNOT GET HER MEDICATIONS FROM THE PHARMACY.  THERE IS A NOTE ON 7/1/22 BUT DOESN'T LOOK LIKE ANYTHING WAS CALLED IN.     Does the patient have less than a 3 day supply:  [x] Yes  [] No    Radha Cabral Rep   07/18/22 12:23 EDT

## 2022-07-20 RX ORDER — CARBAMAZEPINE 200 MG/1
200 TABLET ORAL 2 TIMES DAILY
Qty: 60 TABLET | Refills: 0 | Status: SHIPPED | OUTPATIENT
Start: 2022-07-20 | End: 2022-08-18

## 2022-07-20 RX ORDER — PAROXETINE HYDROCHLORIDE 40 MG/1
40 TABLET, FILM COATED ORAL DAILY
Qty: 90 TABLET | Refills: 0 | Status: SHIPPED | OUTPATIENT
Start: 2022-07-20 | End: 2022-10-17

## 2022-07-20 RX ORDER — INSULIN DETEMIR 100 [IU]/ML
30 INJECTION, SOLUTION SUBCUTANEOUS DAILY
Qty: 15 PEN | Refills: 5 | Status: SHIPPED | OUTPATIENT
Start: 2022-07-20 | End: 2022-08-16 | Stop reason: SDUPTHER

## 2022-07-20 RX ORDER — CLONIDINE HYDROCHLORIDE 0.1 MG/1
0.1 TABLET ORAL 2 TIMES DAILY
Qty: 90 TABLET | Refills: 0 | Status: SHIPPED | OUTPATIENT
Start: 2022-07-20 | End: 2022-09-06

## 2022-07-20 RX ORDER — CYCLOBENZAPRINE HCL 10 MG
10 TABLET ORAL
Qty: 90 TABLET | Refills: 0 | Status: SHIPPED | OUTPATIENT
Start: 2022-07-20 | End: 2022-10-17

## 2022-07-20 RX ORDER — SEMAGLUTIDE 1.34 MG/ML
1 INJECTION, SOLUTION SUBCUTANEOUS WEEKLY
Qty: 4 PEN | Refills: 2 | Status: SHIPPED | OUTPATIENT
Start: 2022-07-20 | End: 2022-10-26 | Stop reason: DRUGHIGH

## 2022-07-20 NOTE — TELEPHONE ENCOUNTER
It was sent today. I do not know what happened or why this was not dfinished when she requested. patiehnt medication has been filled

## 2022-07-26 ENCOUNTER — TELEPHONE (OUTPATIENT)
Dept: FAMILY MEDICINE CLINIC | Facility: CLINIC | Age: 62
End: 2022-07-26

## 2022-08-16 ENCOUNTER — TELEPHONE (OUTPATIENT)
Dept: FAMILY MEDICINE CLINIC | Facility: CLINIC | Age: 62
End: 2022-08-16

## 2022-08-16 DIAGNOSIS — E11.65 TYPE 2 DIABETES MELLITUS WITH HYPERGLYCEMIA, WITHOUT LONG-TERM CURRENT USE OF INSULIN: ICD-10-CM

## 2022-08-16 RX ORDER — INSULIN DETEMIR 100 [IU]/ML
30 INJECTION, SOLUTION SUBCUTANEOUS DAILY
Qty: 15 PEN | Refills: 5 | Status: CANCELLED | OUTPATIENT
Start: 2022-08-16

## 2022-08-16 RX ORDER — INSULIN DETEMIR 100 [IU]/ML
INJECTION, SOLUTION SUBCUTANEOUS
Qty: 15 PEN | Refills: 5 | Status: SHIPPED | OUTPATIENT
Start: 2022-08-16 | End: 2022-10-19 | Stop reason: ALTCHOICE

## 2022-08-16 NOTE — TELEPHONE ENCOUNTER
Caller: [facility], [place] - [address] - [phone] [facility] [phone] FX    Relationship: Pharmacy    Best call back number: [phone]    Requested Prescriptions:   Requested Prescriptions     Pending Prescriptions Disp Refills   • Levemir FlexTouch 100 UNIT/ML injection 15 pen 5     Sig: Inject 30 Units under the skin into the appropriate area as directed Daily.        Pharmacy where request should be sent: [place], [place] - [address]  - [phone]  - [phone] FX     Additional details provided by patient: PATIENT TOLD THEM SHE TAKES 30UNITS IN THE MORNING AND 60UNITS AT NIGHT. BUT THE PRESCRIPTION THEY HAVE IS FOR 30 UNITS ONCE A DAY. PLEASE CALL AND CLARIFY    Does the patient have less than a 3 day supply:  [x] Yes  [] No    Isacc Santos, PCT   08/16/22 09:20 EDT

## 2022-08-17 ENCOUNTER — LAB (OUTPATIENT)
Dept: FAMILY MEDICINE CLINIC | Facility: CLINIC | Age: 62
End: 2022-08-17

## 2022-08-17 DIAGNOSIS — Z79.899 ENCOUNTER FOR LONG-TERM (CURRENT) USE OF OTHER MEDICATIONS: Primary | ICD-10-CM

## 2022-08-17 DIAGNOSIS — Z11.59 ENCOUNTER FOR HEPATITIS C SCREENING TEST FOR LOW RISK PATIENT: ICD-10-CM

## 2022-08-17 DIAGNOSIS — Z85.3 HISTORY OF BILATERAL BREAST CANCER: ICD-10-CM

## 2022-08-17 DIAGNOSIS — F33.41 MAJOR DEPRESSIVE DISORDER, RECURRENT EPISODE, IN PARTIAL REMISSION: ICD-10-CM

## 2022-08-17 PROCEDURE — 36415 COLL VENOUS BLD VENIPUNCTURE: CPT | Performed by: FAMILY MEDICINE

## 2022-08-17 RX ORDER — AMLODIPINE BESYLATE AND BENAZEPRIL HYDROCHLORIDE 5; 40 MG/1; MG/1
CAPSULE ORAL
Qty: 30 CAPSULE | Refills: 0 | Status: SHIPPED | OUTPATIENT
Start: 2022-08-17 | End: 2022-09-15

## 2022-08-17 NOTE — TELEPHONE ENCOUNTER
Incoming Refill Request      Medication requested (name and dose): AMLODIPINE     Pharmacy where request should be sent: [facility]     Additional details provided by patient: PT. HAS 4 PILLS LEFT. CAME INTO OFFICE TO LET US KNOW SHE HAS AN APPT. NEXT WEEK BUT WILL BE OUT OF MEDICATION BY THEN.     Best call back number: [phone]    Does the patient have less than a 3 day supply:  [x] Yes  [] No    Radha FRAUSTO Rep  08/17/22, 08:14 EDT

## 2022-08-17 NOTE — TELEPHONE ENCOUNTER
Rx Refill Note    Requested Prescriptions     Pending Prescriptions Disp Refills   • carBAMazepine (TEGretol) 200 MG tablet [Pharmacy Med Name: CARBAMAZEPINE 200 MG TABLET] 60 tablet 0     Sig: TAKE ONE TABLET BY MOUTH 2 TIMES A DAY.        Last office visit with prescribing clinician: 5/24/2022      Next office visit with prescribing clinician: 8/24/2022   Last labs:   Last refill: 07/20/2022 for 60    Pharmacy (be sure to add in Epic). correct

## 2022-08-18 RX ORDER — CARBAMAZEPINE 200 MG/1
TABLET ORAL
Qty: 60 TABLET | Refills: 0 | Status: SHIPPED | OUTPATIENT
Start: 2022-08-18 | End: 2022-09-15

## 2022-08-19 LAB
ALBUMIN SERPL-MCNC: 4.4 G/DL (ref 3.8–4.9)
ALBUMIN/GLOB SERPL: 1.8 {RATIO} (ref 1.2–2.2)
ALP SERPL-CCNC: 107 IU/L (ref 44–121)
ALT SERPL-CCNC: 8 IU/L (ref 0–32)
AST SERPL-CCNC: 20 IU/L (ref 0–40)
BASOPHILS # BLD AUTO: NORMAL 10*3/UL
BILIRUB SERPL-MCNC: 0.4 MG/DL (ref 0–1.2)
BUN SERPL-MCNC: 8 MG/DL (ref 8–27)
BUN/CREAT SERPL: 10 (ref 12–28)
CALCIUM SERPL-MCNC: 9.3 MG/DL (ref 8.7–10.3)
CHLORIDE SERPL-SCNC: 102 MMOL/L (ref 96–106)
CHOLEST SERPL-MCNC: 150 MG/DL (ref 100–199)
CO2 SERPL-SCNC: 20 MMOL/L (ref 20–29)
CREAT SERPL-MCNC: 0.81 MG/DL (ref 0.57–1)
EGFRCR-CYS SERPLBLD CKD-EPI 2021: 83 ML/MIN/1.73
EOSINOPHIL # BLD AUTO: NORMAL 10*3/UL
EOSINOPHIL NFR BLD AUTO: NORMAL %
ERYTHROCYTE [DISTWIDTH] IN BLOOD BY AUTOMATED COUNT: 13 % (ref 11.7–15.4)
GLOBULIN SER CALC-MCNC: 2.5 G/DL (ref 1.5–4.5)
GLUCOSE SERPL-MCNC: 168 MG/DL (ref 65–99)
HBA1C MFR BLD: 7.2 % (ref 4.8–5.6)
HCT VFR BLD AUTO: 45.2 % (ref 34–46.6)
HCV AB S/CO SERPL IA: <0.1 S/CO RATIO (ref 0–0.9)
HDLC SERPL-MCNC: 43 MG/DL
HGB BLD-MCNC: 14.9 G/DL (ref 11.1–15.9)
LDLC SERPL CALC-MCNC: 72 MG/DL (ref 0–99)
LYMPHOCYTES # BLD AUTO: NORMAL 10*3/UL
LYMPHOCYTES NFR BLD AUTO: NORMAL %
MCH RBC QN AUTO: 31.4 PG (ref 26.6–33)
MCHC RBC AUTO-ENTMCNC: 33 G/DL (ref 31.5–35.7)
MCV RBC AUTO: 95 FL (ref 79–97)
MONOCYTES NFR BLD AUTO: NORMAL %
MORPHOLOGY BLD-IMP: NORMAL
NEUTROPHILS NFR BLD AUTO: NORMAL %
PLATELET # BLD AUTO: 271 X10E3/UL (ref 150–450)
POTASSIUM SERPL-SCNC: 4.1 MMOL/L (ref 3.5–5.2)
PROT SERPL-MCNC: 6.9 G/DL (ref 6–8.5)
RBC # BLD AUTO: 4.75 X10E6/UL (ref 3.77–5.28)
SODIUM SERPL-SCNC: 140 MMOL/L (ref 134–144)
T4 FREE SERPL-MCNC: 0.87 NG/DL (ref 0.82–1.77)
TRIGL SERPL-MCNC: 213 MG/DL (ref 0–149)
TSH SERPL DL<=0.005 MIU/L-ACNC: 1.24 UIU/ML (ref 0.45–4.5)
VLDLC SERPL CALC-MCNC: 35 MG/DL (ref 5–40)
WBC # BLD AUTO: 7.4 X10E3/UL (ref 3.4–10.8)

## 2022-08-24 ENCOUNTER — OFFICE VISIT (OUTPATIENT)
Dept: FAMILY MEDICINE CLINIC | Facility: CLINIC | Age: 62
End: 2022-08-24

## 2022-08-24 VITALS
SYSTOLIC BLOOD PRESSURE: 112 MMHG | DIASTOLIC BLOOD PRESSURE: 72 MMHG | WEIGHT: 196.6 LBS | OXYGEN SATURATION: 98 % | HEART RATE: 77 BPM | HEIGHT: 64 IN | BODY MASS INDEX: 33.57 KG/M2

## 2022-08-24 DIAGNOSIS — Z79.4 TYPE 2 DIABETES MELLITUS WITH DIABETIC NEUROPATHY, WITH LONG-TERM CURRENT USE OF INSULIN: Primary | ICD-10-CM

## 2022-08-24 DIAGNOSIS — E78.2 MIXED HYPERLIPIDEMIA: ICD-10-CM

## 2022-08-24 DIAGNOSIS — E11.40 TYPE 2 DIABETES MELLITUS WITH DIABETIC NEUROPATHY, WITH LONG-TERM CURRENT USE OF INSULIN: Primary | ICD-10-CM

## 2022-08-24 DIAGNOSIS — I10 PRIMARY HYPERTENSION: ICD-10-CM

## 2022-08-24 PROCEDURE — 99214 OFFICE O/P EST MOD 30 MIN: CPT | Performed by: FAMILY MEDICINE

## 2022-08-24 NOTE — PROGRESS NOTES
"Chief Complaint  Diabetes, Hypertension, and Hyperlipidemia    Subjective          Tara Perez presents to [facility]  Patient is a 61-year-old female insulin-dependent type 2 diabetic who presents for follow-up examination she is here for blood work follow-up    She notes she is doing well on the 30 units of insulin in the morning and 60 units at night she continues to do the Ozempic along with oral Jardiance.  She notes appropriate glycemic control on this regimen          Objective   Vital Signs:   /72   Pulse 77   Ht 162.6 cm (64.02\")   Wt 89.2 kg (196 lb 9.6 oz)   SpO2 98%   BMI 33.73 kg/m²     Body mass index is 33.73 kg/m².    Review of Systems   Constitutional: Negative.    HENT: Negative.    Eyes: Negative.    Respiratory: Negative.    Cardiovascular: Negative.    Gastrointestinal: Negative.    Endocrine: Negative.    Genitourinary: Negative.    Musculoskeletal: Negative.    Skin: Negative.    Allergic/Immunologic: Negative.    Neurological: Negative.    Hematological: Negative.    Psychiatric/Behavioral: Negative.        Past History:  Medical History: has no past medical history on file.   Surgical History: has no past surgical history on file.   Family History: family history is not on file.   Social History: reports that she quit smoking about 30 years ago. Her smoking use included cigarettes. She started smoking about 47 years ago. She has a 17.00 pack-year smoking history. She has never used smokeless tobacco. She reports previous alcohol use. She reports that she does not use drugs.      Current Outpatient Medications:   •  amLODIPine-benazepril (LOTREL) 5-40 MG per capsule, 1 po QD, Disp: 30 capsule, Rfl: 0  •  atorvastatin (LIPITOR) 80 MG tablet, Take 80 mg by mouth Daily., Disp: , Rfl:   •  BD Pen Needle Lisa U/F 32G X 4 MM misc, 2 (Two) Times a Day. as directed, Disp: , Rfl:   •  carBAMazepine (TEGretol) 200 MG tablet, TAKE ONE TABLET BY MOUTH 2 TIMES A " DAY., Disp: 60 tablet, Rfl: 0  •  cetirizine (zyrTEC) 10 MG tablet, TAKE ONE TABLET BY MOUTH EVERY DAY, Disp: 30 tablet, Rfl: 1  •  cloNIDine (CATAPRES) 0.1 MG tablet, Take 1 tablet by mouth 2 (Two) Times a Day., Disp: 90 tablet, Rfl: 0  •  cyclobenzaprine (FLEXERIL) 10 MG tablet, Take 1 tablet by mouth every night at bedtime., Disp: 90 tablet, Rfl: 0  •  Easy Touch Lancets 30G/Twist misc, USE AS DIRECTED TO TEST BLOOD SUGAR 3 TIMES DAILY, Disp: , Rfl:   •  empagliflozin (JARDIANCE) 25 MG tablet tablet, Take 25 mg by mouth Daily., Disp: , Rfl:   •  ezetimibe (ZETIA) 10 MG tablet, Take 10 mg by mouth every night at bedtime., Disp: , Rfl:   •  gabapentin (NEURONTIN) 400 MG capsule, Take 1 capsule by mouth every night at bedtime., Disp: 30 capsule, Rfl: 2  •  Levemir FlexTouch 100 UNIT/ML injection, 30u IN THE AM AND 60U IN THE PM, Disp: 15 pen, Rfl: 5  •  OneTouch Ultra test strip, TEST BLOOD SUGAR 3 TIMES DAILY, Disp: , Rfl:   •  Ozempic, 1 MG/DOSE, 4 MG/3ML solution pen-injector, Inject 1 mg under the skin into the appropriate area as directed 1 (One) Time Per Week., Disp: 4 pen, Rfl: 2  •  PARoxetine (PAXIL) 40 MG tablet, Take 1 tablet by mouth Daily., Disp: 90 tablet, Rfl: 0  •  vitamin D (ERGOCALCIFEROL) 1.25 MG (29187 UT) capsule capsule, Take 50,000 Units by mouth 2 (Two) Times a Week., Disp: , Rfl:     Allergies: Penicillins    Physical Exam  Constitutional:       Appearance: Normal appearance. She is normal weight.   HENT:      Head: Normocephalic and atraumatic.   Eyes:      Conjunctiva/sclera: Conjunctivae normal.   Cardiovascular:      Rate and Rhythm: Normal rate and regular rhythm.   Pulmonary:      Effort: Pulmonary effort is normal.      Breath sounds: Normal breath sounds.   Musculoskeletal:         General: Normal range of motion.      Cervical back: Normal range of motion and neck supple.   Skin:     General: Skin is warm and dry.   Neurological:      General: No focal deficit present.      Mental  Status: She is alert and oriented to person, place, and time. Mental status is at baseline.   Psychiatric:         Mood and Affect: Mood normal.         Behavior: Behavior normal.         Thought Content: Thought content normal.         Judgment: Judgment normal.          Result Review :                   Assessment and Plan    Diagnoses and all orders for this visit:    1. Type 2 diabetes mellitus with diabetic neuropathy, with long-term current use of insulin (HCC) (Primary)  Comments:  30U LA + 60U night, + jardiance + ozempic  Orders:  -     Ambulatory Referral to Endocrinology  Stable for now we will refer to endocrinology just to get established as she has some trouble with the insulin dissolving in her abdomen but seems to have appropriate glycemic control her A1c has come down to 7.2      2. Mixed hyperlipidemia  Stable continue meds and monitoring    3. Primary hypertension  Stable    MEDICATION SIDE EFFECTS, RISKS AND SIDE EFFECTS HAVE BEEN DISCUSSED WITH PATIENT. PATIENT NOTES UNDERSTANDING. IF ANY CONCERN OR QUESTION REGARDING MEDICATION PLEASE CONTACT.           Follow Up   No follow-ups on file.  Patient was given instructions and counseling regarding her condition or for health maintenance advice. Please see specific information pulled into the AVS if appropriate.     Naomi Sandy DO

## 2022-08-29 DIAGNOSIS — J30.9 ALLERGIC RHINITIS, UNSPECIFIED SEASONALITY, UNSPECIFIED TRIGGER: ICD-10-CM

## 2022-08-29 NOTE — TELEPHONE ENCOUNTER
Rx Refill Note    Requested Prescriptions     Pending Prescriptions Disp Refills   • cetirizine (zyrTEC) 10 MG tablet [Pharmacy Med Name: CETIRIZINE HCL 10 MG TABLET] 30 tablet 0     Sig: TAKE ONE TABLET BY MOUTH EVERY DAY        Last office visit with prescribing clinician: 8/24/2022      Next office visit with prescribing clinician: 11/28/2022   Last labs:   Last refill: 07/05/2022   Pharmacy (be sure to add in Epic). correct

## 2022-08-30 RX ORDER — CETIRIZINE HYDROCHLORIDE 10 MG/1
TABLET ORAL
Qty: 30 TABLET | Refills: 0 | Status: SHIPPED | OUTPATIENT
Start: 2022-08-30 | End: 2022-10-28 | Stop reason: SDUPTHER

## 2022-09-03 DIAGNOSIS — I10 ESSENTIAL HYPERTENSION, BENIGN: ICD-10-CM

## 2022-09-06 RX ORDER — CLONIDINE HYDROCHLORIDE 0.1 MG/1
TABLET ORAL
Qty: 90 TABLET | Refills: 0 | Status: SHIPPED | OUTPATIENT
Start: 2022-09-06 | End: 2022-10-19

## 2022-09-06 NOTE — TELEPHONE ENCOUNTER
Rx Refill Note    Requested Prescriptions     Pending Prescriptions Disp Refills   • cloNIDine (CATAPRES) 0.1 MG tablet [Pharmacy Med Name: CLONIDINE HCL 0.1 MG TABLET] 90 tablet 0     Sig: TAKE ONE TABLET BY MOUTH 2 TIMES A DAY        Last office visit with prescribing clinician: 8/24/2022      Next office visit with prescribing clinician: 11/28/2022   Last labs: 08/17/2022  Last refill:  07/20/2022  Pharmacy  [facility] should have enough till 10/20/2022

## 2022-09-09 DIAGNOSIS — G62.0 CHEMOTHERAPY-INDUCED NEUROPATHY: ICD-10-CM

## 2022-09-09 DIAGNOSIS — T45.1X5A CHEMOTHERAPY-INDUCED NEUROPATHY: ICD-10-CM

## 2022-09-09 DIAGNOSIS — E11.40 TYPE 2 DIABETES MELLITUS WITH DIABETIC NEUROPATHY, WITH LONG-TERM CURRENT USE OF INSULIN: ICD-10-CM

## 2022-09-09 DIAGNOSIS — Z79.4 TYPE 2 DIABETES MELLITUS WITH DIABETIC NEUROPATHY, WITH LONG-TERM CURRENT USE OF INSULIN: ICD-10-CM

## 2022-09-09 RX ORDER — GABAPENTIN 400 MG/1
CAPSULE ORAL
Qty: 30 CAPSULE | Refills: 0 | Status: SHIPPED | OUTPATIENT
Start: 2022-09-09 | End: 2022-10-07 | Stop reason: SDUPTHER

## 2022-09-09 NOTE — TELEPHONE ENCOUNTER
Rx Refill Note    Requested Prescriptions     Pending Prescriptions Disp Refills   • gabapentin (NEURONTIN) 400 MG capsule [Pharmacy Med Name: GABAPENTIN 400 MG CAPSULE] 30 capsule 0     Sig: TAKE ONE CAPSULE BY MOUTH EVERY NIGHT AT BEDTIME        Last office visit with prescribing clinician: 8/24/2022      Next office visit with prescribing clinician: 11/28/2022   Last labs:   Last refill: 05/24/2022   Pharmacy (be sure to add in Epic). correct

## 2022-09-15 DIAGNOSIS — I10 PRIMARY HYPERTENSION: Primary | ICD-10-CM

## 2022-09-15 DIAGNOSIS — F33.41 MAJOR DEPRESSIVE DISORDER, RECURRENT EPISODE, IN PARTIAL REMISSION: ICD-10-CM

## 2022-09-15 RX ORDER — AMLODIPINE BESYLATE AND BENAZEPRIL HYDROCHLORIDE 5; 40 MG/1; MG/1
CAPSULE ORAL
Qty: 30 CAPSULE | Refills: 2 | Status: SHIPPED | OUTPATIENT
Start: 2022-09-15 | End: 2022-12-21 | Stop reason: SDUPTHER

## 2022-09-15 RX ORDER — CARBAMAZEPINE 200 MG/1
TABLET ORAL
Qty: 60 TABLET | Refills: 1 | Status: SHIPPED | OUTPATIENT
Start: 2022-09-15 | End: 2022-11-11

## 2022-09-15 NOTE — TELEPHONE ENCOUNTER
Rx Refill Note  Requested Prescriptions     Pending Prescriptions Disp Refills   • amLODIPine-benazepril (LOTREL) 5-40 MG per capsule [Pharmacy Med Name: AMLODIPINE-BENAZ 5-40 MG] 30 capsule 0     Sig: TAKE ONE CAPSULE BY MOUTH EVERY DAY   • carBAMazepine (TEGretol) 200 MG tablet [Pharmacy Med Name: CARBAMAZEPINE 200 MG TABLET] 60 tablet 0     Sig: TAKE ONE TABLET BY MOUTH 2 TIMES A DAY.      Last office visit with prescribing clinician: 8/24/2022      Next office visit with prescribing clinician: 11/28/2022   Please add Last Relevant Lab Date if appropriate:8/17/2223}   Is Refill Pharmacy correct?:yes23}  RANDAL SHEA RN  09/15/22, 09:30 EDT

## 2022-10-07 DIAGNOSIS — Z79.4 TYPE 2 DIABETES MELLITUS WITH DIABETIC NEUROPATHY, WITH LONG-TERM CURRENT USE OF INSULIN: ICD-10-CM

## 2022-10-07 DIAGNOSIS — E11.40 TYPE 2 DIABETES MELLITUS WITH DIABETIC NEUROPATHY, WITH LONG-TERM CURRENT USE OF INSULIN: ICD-10-CM

## 2022-10-07 DIAGNOSIS — T45.1X5A CHEMOTHERAPY-INDUCED NEUROPATHY: ICD-10-CM

## 2022-10-07 DIAGNOSIS — G62.0 CHEMOTHERAPY-INDUCED NEUROPATHY: ICD-10-CM

## 2022-10-07 RX ORDER — ATORVASTATIN CALCIUM 80 MG/1
80 TABLET, FILM COATED ORAL DAILY
Qty: 30 TABLET | Refills: 0 | Status: SHIPPED | OUTPATIENT
Start: 2022-10-07 | End: 2022-11-03 | Stop reason: SDUPTHER

## 2022-10-07 RX ORDER — GABAPENTIN 400 MG/1
400 CAPSULE ORAL
Qty: 30 CAPSULE | Refills: 0 | Status: SHIPPED | OUTPATIENT
Start: 2022-10-07 | End: 2022-11-09 | Stop reason: SDUPTHER

## 2022-10-07 NOTE — TELEPHONE ENCOUNTER
Caller: Merari Perezly    Relationship: Self    Best call back number: [phone]    Requested Prescriptions:   Requested Prescriptions     Pending Prescriptions Disp Refills   • gabapentin (NEURONTIN) 400 MG capsule 30 capsule 0     Sig: Take 1 capsule by mouth every night at bedtime.   • atorvastatin (LIPITOR) 80 MG tablet 90 tablet      Sig: Take 1 tablet by mouth Daily.        Pharmacy where request should be sent: [address] - [phone] [facility] [phone] FX     Additional details provided by patient: COMPLETELY OUT OF ATORVASTIN AND WILL BE OUT OF OTHER ON Sunday        Does the patient have less than a 3 day supply:  [x] Yes  [] No    Radha Anne Rep   10/07/22 11:25 EDT

## 2022-10-07 NOTE — TELEPHONE ENCOUNTER
Rx Refill Note    Requested Prescriptions     Pending Prescriptions Disp Refills   • gabapentin (NEURONTIN) 400 MG capsule 30 capsule 0     Sig: Take 1 capsule by mouth every night at bedtime.   • atorvastatin (LIPITOR) 80 MG tablet 30 tablet 0     Sig: Take 1 tablet by mouth Daily.        Last office visit with prescribing clinician: 8/24/2022      Next office visit with prescribing clinician: 11/28/2022   Last labs:   Last refill: 09/09/2022 on the gabapentin   Pharmacy (be sure to add in Epic). correct

## 2022-10-14 NOTE — PROGRESS NOTES
Chief complaint/Reason for consult: Uncontrolled type 2 diabetes    Consult requested by Naomi Sandy DO    HPI: Ms. Perez is a 61-year-old female with type 2 diabetes, history of breast cancer, hypertension, depression and mixed hyperlipidemia who comes for consultation regarding type 2 diabetes.    # [id number], Uncontrolled due to hyperglycemia with complications  # Diabetic retinopathy   - Diagnosed: around    - Current regimen includes: Jardiance 25 mg daily, Levemir 30 units in the morning and 60 units in the evening and Ozempic 1 mg weekly  - Tried metformin in the past but had intolerable GI issues   - Has a lot of yeast infections now which are very bothersome  - Compliance with medications is good, rarely misses any doses   - Eats one main meal per day (dinner), has some snacks during the day   - Has a regular soda in the mornings, may have some peanut butter crackers or a sandwich but really does not eat much until dinner (eats TV dinners)   -   1 year ago so has not had the best diet since that time and has very low income and is on a fixed budget for food  - HbA1c: 7.2% on 2022  - Checks FSBG once daily in the mornings  - BG reportedly in the mid 100s although I do not have this data to review  - Hypoglycemia does not occur, lowest BG 80mg/dl    - Hyperglycemia occurs after eating high sugar meals and when she is not active   - 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), she is currently taking gabapentin and reports that it helps   - Patient denies gastroparesis   - Patient reports rotating injection sites but gets a lot of knots after injections and occasionally insulin will leak out  - Patient without known ASCVD   - taking ACEi; blood pressure today 132/78     DM Health Maintenance:  Ophtho: last done 2021 and has diabetic retinopathy that is mild, she is looking for a new ophthalmologist  Monofilament / Foot exam: abnormal, done today   Lipids/Statin: taking a  "statin and Zetia with last FLP showing LDL 72, total cholesterol 150, HDL 43 and triglycerides 213  RACHAEL: Due  TSH: 1.240 on 8/17/2022  Aspirin: not taking     # Mixed hyperlipidemia  - Compliant with atorvastatin 80 mg daily and ezetimibe 10 mg daily  - FLP showing LDL 72, total cholesterol 150, HDL 43 and triglycerides 213 on 8/22    Past medical history, past surgical history, family history and social history reviewed within this encounter.     Review of Systems   Constitutional: Positive for diaphoresis. Negative for unexpected weight change.   HENT: Positive for ear discharge. Negative for trouble swallowing and voice change.    Eyes: Negative for visual disturbance.   Respiratory: Negative for shortness of breath.    Cardiovascular: Negative for chest pain and palpitations.   Gastrointestinal: Negative for abdominal distention and abdominal pain.   Endocrine: Positive for polydipsia. Negative for cold intolerance and heat intolerance.   Musculoskeletal: Negative for arthralgias.   Skin: Negative for rash.   Allergic/Immunologic: Positive for food allergies.   Neurological: Positive for numbness.   Psychiatric/Behavioral: Negative for agitation and confusion.        /78 (BP Location: Right arm, Patient Position: Sitting, Cuff Size: Adult) Comment (BP Location): Can only use Rt arm  Pulse 76   Ht 162.6 cm (64.02\")   Wt 89 kg (196 lb 4.8 oz)   SpO2 98%   BMI 33.67 kg/m²      Physical Exam  Vitals reviewed.   Constitutional:       General: She is not in acute distress.     Appearance: She is obese.   HENT:      Head: Normocephalic.   Eyes:      Conjunctiva/sclera: Conjunctivae normal.   Cardiovascular:      Rate and Rhythm: Normal rate.      Pulses:           Dorsalis pedis pulses are 2+ on the right side and 2+ on the left side.   Pulmonary:      Effort: Pulmonary effort is normal.   Abdominal:      Tenderness: There is no guarding.      Comments: No lipohypertrophy on the abdominal wall soft tissue "   Musculoskeletal:         General: Normal range of motion.      Right foot: No deformity or bunion.      Left foot: No deformity or bunion.   Feet:      Right foot:      Protective Sensation: 8 sites tested. 0 sites sensed.      Skin integrity: Dry skin present.      Toenail Condition: Right toenails are normal.      Left foot:      Protective Sensation: 8 sites tested. 0 sites sensed.      Skin integrity: Dry skin present.      Toenail Condition: Left toenails are normal.      Comments: Pre-ulcerative callus on bilateral heels  Skin:     General: Skin is warm and dry.   Neurological:      Mental Status: She is alert and oriented to person, place, and time.   Psychiatric:         Mood and Affect: Mood normal.         Thought Content: Thought content normal.        Labs and images reviewed as noted in the HPI    Assessment and plan:    Diagnoses and all orders for this visit:    1. Type 2 diabetes mellitus with hyperglycemia, with long-term current use of insulin (HCC) (Primary)  Assessment & Plan:  -Patient with mild hyperglycemia based on review of blood glucoses  -Counseled that she should really avoid sweetened beverages like regular soda and if she does not tolerate artificial sweeteners she should stick to water  -Counseled that long term hyperglycemia can cause worsening neuropathy, kidney damage, eye damage, and cardiovascular disease  -Given she has recurrent yeast infections recommend avoiding SGLT2 inhibitors, will stop Jardiance 25 mg today  -Continue Ozempic 1 mg weekly  -Discussed with patient the need for prandial insulin to better control her glucoses as she is at risk for fasting hypoglycemia and postprandial hyperglycemia with basal only insulin which will likely only worsen with stopping Jardiance, at this time patient really does not want to take multiple different types of insulin and prefers to try a premixed insulin to see if this would help control her blood glucoses  -I do think her insulin  requirements will likely increase after stopping Jardiance but given I do not know how much of the insulin she is currently absorbing due to insulin leaking out after injections I will make a slight decrease in her total daily dose  -Recommend starting premix 70/30 20 units just prior to breakfast and 50 units just prior to dinner as dinner is her main meal for the day  -I counseled the patient that she does need to eat 3 consistent meals per day  -Recommend increasing the needle length to reduce the risk of insulin leaking out after injections to the 8 mm pen needle  -Recommend patient check blood sugar at least twice a day prior to injections, will try to get a CGM, freestyle alon 2 approved for    DM Health Maintenance:  Ophtho: last done 5/2021 and has diabetic retinopathy that is mild, will refer for an eye exam  Monofilament / Foot exam: abnormal due to decreased sensation to monofilament done today   Lipids/Statin: Continue statin and Zetia, last FLP showing LDL 72, total cholesterol 150, HDL 43 and triglycerides 213, consider adding fenofibrate given her history of retinopathy at coming visits  RACHAEL: Ordered today  TSH: 1.240 on 8/17/2022  Aspirin: not taking     Orders:  -     Microalbumin / Creatinine Urine Ratio - Urine, Clean Catch; Future  -     Ambulatory Referral for Diabetic Eye Exam-Ophthalmology  -     insulin aspart prot & aspart (NovoLOG 70/30 FlexPen ReliOn) (70-30) 100 UNIT/ML suspension pen-injector injection; Inject 20u under the skin before breakfast and 50u under the skin before dinner, titrate dose as directed for max daily dose 100u  Dispense: 30 mL; Refill: 3  -     Insulin Pen Needle (B-D ULTRAFINE III SHORT PEN) 31G X 8 MM misc; Use as directed twice daily  Dispense: 90 each; Refill: 11  -     OneTouch Ultra test strip; Use as instructed to test twice daily  Dispense: 100 each; Refill: 11  -     Continuous Blood Gluc  (FreeStyle Alon 2 [place]) device; 1 each Daily.  Dispense:  1 each; Refill: 0  -     Continuous Blood Gluc Sensor (FreeStyle Alon 2 Sensor) misc; 1 each Every 14 (Fourteen) Days.  Dispense: 6 each; Refill: 2    2. Mixed hyperlipidemia  Assessment & Plan:  -Continue atorvastatin 80 mg daily  -Continue ezetimibe 10 mg daily  -Consider adding fenofibrate at next visit due to her history of retinopathy       Return in about 1 week (around 10/26/2022) for T2DM .     I spent 50 minutes caring for Tara on this date of service. This time includes time spent by me in the following activities: preparing for the visit, reviewing tests, performing a medically appropriate examination and/or evaluation, counseling and educating the patient/family/caregiver, ordering medications, tests, or procedures and documenting information in the medical record     Electronically signed by: Kyle S Rosenstein, MD     Addendum  Labs 10/19/2022  Urine microalbumin was negative  Discussed results on the telephone with the patient.  She has not started her new insulin yet as the pharmacy was out and had to order it but plans to start it today.  Recommend she call us with any issues or concerns.  I also let her know that regarding diabetic foot wear she should contact her insurance to see who they approve in her area for this.

## 2022-10-17 DIAGNOSIS — T14.8XXA MUSCLE STRAIN: ICD-10-CM

## 2022-10-17 DIAGNOSIS — F33.41 MAJOR DEPRESSIVE DISORDER, RECURRENT EPISODE, IN PARTIAL REMISSION: ICD-10-CM

## 2022-10-17 RX ORDER — PAROXETINE HYDROCHLORIDE 40 MG/1
TABLET, FILM COATED ORAL
Qty: 90 TABLET | Refills: 0 | Status: SHIPPED | OUTPATIENT
Start: 2022-10-17 | End: 2023-01-13

## 2022-10-17 RX ORDER — CYCLOBENZAPRINE HCL 10 MG
TABLET ORAL
Qty: 30 TABLET | Refills: 0 | Status: SHIPPED | OUTPATIENT
Start: 2022-10-17 | End: 2022-11-15

## 2022-10-17 NOTE — TELEPHONE ENCOUNTER
Rx Refill Note    Requested Prescriptions     Pending Prescriptions Disp Refills   • PARoxetine (PAXIL) 40 MG tablet [Pharmacy Med Name: PAROXETINE HCL 40 MG TABLET] 90 tablet 0     Sig: TAKE ONE TABLET BY MOUTH EVERY DAY   • cyclobenzaprine (FLEXERIL) 10 MG tablet [Pharmacy Med Name: CYCLOBENZAPRINE 10 MG TABLET] 30 tablet 0     Sig: TAKE ONE TABLET BY MOUTH EVERY EVENING AT BEDTIME        Last office visit with prescribing clinician: 8/24/2022      Next office visit with prescribing clinician: 11/28/2022   Last labs:   Last refill: needs   Pharmacy (be sure to add in Epic). correct

## 2022-10-19 ENCOUNTER — OFFICE VISIT (OUTPATIENT)
Dept: ENDOCRINOLOGY | Facility: CLINIC | Age: 62
End: 2022-10-19

## 2022-10-19 VITALS
WEIGHT: 196.3 LBS | OXYGEN SATURATION: 98 % | HEIGHT: 64 IN | SYSTOLIC BLOOD PRESSURE: 132 MMHG | DIASTOLIC BLOOD PRESSURE: 78 MMHG | HEART RATE: 76 BPM | BODY MASS INDEX: 33.51 KG/M2

## 2022-10-19 DIAGNOSIS — I10 ESSENTIAL HYPERTENSION, BENIGN: ICD-10-CM

## 2022-10-19 DIAGNOSIS — E78.2 MIXED HYPERLIPIDEMIA: ICD-10-CM

## 2022-10-19 DIAGNOSIS — Z79.4 TYPE 2 DIABETES MELLITUS WITH HYPERGLYCEMIA, WITH LONG-TERM CURRENT USE OF INSULIN: Primary | ICD-10-CM

## 2022-10-19 DIAGNOSIS — E11.65 TYPE 2 DIABETES MELLITUS WITH HYPERGLYCEMIA, WITH LONG-TERM CURRENT USE OF INSULIN: Primary | ICD-10-CM

## 2022-10-19 PROBLEM — G62.0 CHEMOTHERAPY-INDUCED NEUROPATHY: Status: ACTIVE | Noted: 2022-10-19

## 2022-10-19 PROBLEM — E11.9 DIABETES MELLITUS: Status: RESOLVED | Noted: 2022-05-17 | Resolved: 2022-10-19

## 2022-10-19 PROBLEM — E11.3293 MILD NONPROLIFERATIVE DIABETIC RETINOPATHY OF BOTH EYES WITHOUT MACULAR EDEMA ASSOCIATED WITH TYPE 2 DIABETES MELLITUS: Status: ACTIVE | Noted: 2022-10-19

## 2022-10-19 PROBLEM — T45.1X5A CHEMOTHERAPY-INDUCED NEUROPATHY: Status: ACTIVE | Noted: 2022-10-19

## 2022-10-19 PROCEDURE — 99204 OFFICE O/P NEW MOD 45 MIN: CPT | Performed by: HOSPITALIST

## 2022-10-19 RX ORDER — PEN NEEDLE, DIABETIC 31 GX5/16"
NEEDLE, DISPOSABLE MISCELLANEOUS
Qty: 90 EACH | Refills: 11 | Status: SHIPPED | OUTPATIENT
Start: 2022-10-19

## 2022-10-19 RX ORDER — BLOOD SUGAR DIAGNOSTIC
STRIP MISCELLANEOUS
Qty: 100 EACH | Refills: 11 | Status: SHIPPED | OUTPATIENT
Start: 2022-10-19

## 2022-10-19 RX ORDER — INSULIN ASPART 100 [IU]/ML
INJECTION, SUSPENSION SUBCUTANEOUS
Qty: 30 ML | Refills: 3 | Status: SHIPPED | OUTPATIENT
Start: 2022-10-19 | End: 2022-10-26 | Stop reason: SDUPTHER

## 2022-10-19 RX ORDER — CLONIDINE HYDROCHLORIDE 0.1 MG/1
TABLET ORAL
Qty: 60 TABLET | Refills: 0 | Status: SHIPPED | OUTPATIENT
Start: 2022-10-19 | End: 2022-11-17

## 2022-10-19 NOTE — ASSESSMENT & PLAN NOTE
-Continue atorvastatin 80 mg daily  -Continue ezetimibe 10 mg daily  -Consider adding fenofibrate at next visit due to her history of retinopathy

## 2022-10-19 NOTE — ASSESSMENT & PLAN NOTE
-Patient with mild hyperglycemia based on review of blood glucoses  -Counseled that she should really avoid sweetened beverages like regular soda and if she does not tolerate artificial sweeteners she should stick to water  -Counseled that long term hyperglycemia can cause worsening neuropathy, kidney damage, eye damage, and cardiovascular disease  -Given she has recurrent yeast infections recommend avoiding SGLT2 inhibitors, will stop Jardiance 25 mg today  -Continue Ozempic 1 mg weekly  -Discussed with patient the need for prandial insulin to better control her glucoses as she is at risk for fasting hypoglycemia and postprandial hyperglycemia with basal only insulin which will likely only worsen with stopping Jardiance, at this time patient really does not want to take multiple different types of insulin and prefers to try a premixed insulin to see if this would help control her blood glucoses  -I do think her insulin requirements will likely increase after stopping Jardiance but given I do not know how much of the insulin she is currently absorbing due to insulin leaking out after injections I will make a slight decrease in her total daily dose  -Recommend starting premix 70/30 20 units just prior to breakfast and 50 units just prior to dinner as dinner is her main meal for the day  -I counseled the patient that she does need to eat 3 consistent meals per day  -Recommend increasing the needle length to reduce the risk of insulin leaking out after injections to the 8 mm pen needle  -Recommend patient check blood sugar at least twice a day prior to injections, will try to get a CGM, freestyle etienne 2 approved for    DM Health Maintenance:  Ophtho: last done 5/2021 and has diabetic retinopathy that is mild, will refer for an eye exam  Monofilament / Foot exam: abnormal due to decreased sensation to monofilament done today   Lipids/Statin: Continue statin and Zetia, last FLP showing LDL 72, total cholesterol 150, HDL  43 and triglycerides 213, consider adding fenofibrate given her history of retinopathy at coming visits  RACHAEL: Ordered today  TSH: 1.240 on 8/17/2022  Aspirin: not taking

## 2022-10-19 NOTE — TELEPHONE ENCOUNTER
Rx Refill Note    Requested Prescriptions     Pending Prescriptions Disp Refills   • cloNIDine (CATAPRES) 0.1 MG tablet [Pharmacy Med Name: CLONIDINE HCL 0.1 MG TABLET] 60 tablet 0     Sig: TAKE ONE TABLET BY MOUTH 2 TIMES A DAY        Last office visit with prescribing clinician: 8/24/2022      Next office visit with prescribing clinician: 11/28/2022   Last labs:   Last refill: 09/06/2022   Pharmacy (be sure to add in Epic). correct

## 2022-10-20 LAB
ALBUMIN/CREAT UR: <6 MG/G CREAT (ref 0–29)
CREAT UR-MCNC: 52.5 MG/DL
MICROALBUMIN UR-MCNC: <3 UG/ML

## 2022-10-21 NOTE — PROGRESS NOTES
Chief complaint/Reason for consult: Type 2 diabetes mellitus    HPI: Ms. Perez is a 61-year-old female with type 2 diabetes, history of breast cancer, hypertension, depression and mixed hyperlipidemia who comes for follow-up of type 2 diabetes.  She was last seen in this office on 10/19/2022 and was started on 70/30 premixed insulin and reports since that time doing well although her BG is still higher than she would like.      # [id number], Uncontrolled due to hyperglycemia with complications  # Diabetic retinopathy   - Diagnosed: around 2014   - Current regimen includes: 70/30 20 units with breakfast and 50 units with supper and Ozempic 1 mg weekly  - Tried metformin in the past but had intolerable GI issues   - Had a lot of yeast infections so SGLT2 inhibitors were stopped  - Compliance with medications is good, rarely misses any doses   - Eats one main meal per day (dinner), a smaller breakfast and has some snacks during the day   - Recommended at last visit she avoid regular sodas, today she reports having regular soda at bedtime with her medications otherwise they won't work   - Access to food is an issue due to her fixed income  - HbA1c: 7.2% on 8/17/2022  - Checks FSBG  twice daily, blood glucose log shows:     AM glucose 141-194  PM glucose 132-200     - Hypoglycemia is not occurring   - Hyperglycemia occurs with poor food choices or if she has 2 sodas at night instead of 1  - 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), stable on gabapentin  - Patient denies gastroparesis   - Patient reports rotating injection sites  reports no insulin is leaking out after increasing the needle length   - Patient without known ASCVD   - taking ACEi; blood pressure today 124/78      DM Health Maintenance:  Ophtho: last done 5/2021 and has diabetic retinopathy that is mild, she is looking for a new ophthalmologist still, will again follow-up at next visit to see if she has scheduled this  Monofilament / Foot  "exam: abnormal, done 10/19/2022  Lipids/Statin: taking a statin and Zetia with last FLP showing LDL 72, total cholesterol 150, HDL 43 and triglycerides 213  RACHAEL:  Negative on 10/19/2022  TSH: 1.240 on 8/17/2022  Aspirin: not taking      # Mixed hyperlipidemia  - Compliant with atorvastatin 80 mg daily and ezetimibe 10 mg daily  - FLP showing LDL 72, total cholesterol 150, HDL 43 and triglycerides 213 on 8/22    Past medical history, past surgical history, family history and social history reviewed within this encounter.     Review of Systems   Constitutional: Negative for activity change and unexpected weight change.   HENT: Negative for trouble swallowing and voice change.    Eyes: Negative for pain and redness.   Respiratory: Negative for shortness of breath.    Cardiovascular: Negative for chest pain and palpitations.   Gastrointestinal: Negative for abdominal pain.   Endocrine: Negative for cold intolerance and heat intolerance.   Musculoskeletal: Positive for gait problem.   Skin: Negative for rash.   Neurological: Negative for headaches.   Psychiatric/Behavioral: Negative for agitation and confusion.        /78 (BP Location: Left arm, Patient Position: Sitting, Cuff Size: Adult)   Pulse 95   Ht 162.6 cm (64.02\")   Wt 89 kg (196 lb 3.2 oz)   SpO2 99%   BMI 33.66 kg/m²      Physical Exam  Vitals reviewed.   Constitutional:       General: She is not in acute distress.     Appearance: She is obese.   HENT:      Head: Normocephalic.   Eyes:      Conjunctiva/sclera: Conjunctivae normal.   Cardiovascular:      Rate and Rhythm: Normal rate.   Pulmonary:      Effort: Pulmonary effort is normal.   Abdominal:      Tenderness: There is no guarding.   Skin:     General: Skin is warm and dry.   Neurological:      Mental Status: She is alert and oriented to person, place, and time.   Psychiatric:         Mood and Affect: Mood normal.         Thought Content: Thought content normal.          Labs and images " reviewed as noted in the HPI    Assessment and plan:    Diagnoses and all orders for this visit:    1. Type 2 diabetes mellitus with hyperglycemia, with long-term current use of insulin (HCC) (Primary)  Assessment & Plan:  -Patient with significant improvement in her glycemic control since last visit  -She continues to be very worried about hyperglycemia although her glucoses are very close to goal at this time  -She would very much like to target a lower glucose goal of 100 to 120 mg/dL, I cautioned her on the risk of this particularly the risk of hypoglycemia which can lead to heart attack, stroke and death but despite these concerns she was adamant that she would like to target a lower glycemic goal  -Recommend increasing her 70/30 to 22 units in the morning and 55 units with supper  -Recommend increasing her Ozempic to 2 mg weekly  -Recommend she continue to check her blood glucose twice daily and as needed for symptoms of hypoglycemia or hyperglycemia  -Counseled on when to check for hypoglycemia and how to treat it    DM Health Maintenance:  Ophtho: last done 5/2021 and has diabetic retinopathy that is mild, she is looking for a new ophthalmologist still, will again follow-up at next visit to see if she has scheduled this  Monofilament / Foot exam: abnormal, done 10/19/2022  Lipids/Statin: taking a statin and Zetia with last FLP showing LDL 72, total cholesterol 150, HDL 43 and triglycerides 213, recommend repeating in 3 to 6 months  RACHAEL:  Negative on 10/19/2022, repeat annually  TSH: 1.240 on 8/17/2022  Aspirin: not taking     Orders:  -     Semaglutide, 2 MG/DOSE, (Ozempic, 2 MG/DOSE,) 8 MG/3ML solution pen-injector; Inject 2 mg under the skin into the appropriate area as directed 1 (One) Time Per Week.  Dispense: 3 mL; Refill: 3       Return in about 3 weeks (around 11/16/2022) for T2DM .     I spent 25 minutes caring for Tara on this date of service. This time includes time spent by me in the following  activities: preparing for the visit, reviewing tests, performing a medically appropriate examination and/or evaluation, ordering medications, tests, or procedures and documenting information in the medical record     Electronically signed by: Kyle S Rosenstein, MD

## 2022-10-26 ENCOUNTER — OFFICE VISIT (OUTPATIENT)
Dept: ENDOCRINOLOGY | Facility: CLINIC | Age: 62
End: 2022-10-26

## 2022-10-26 VITALS
HEART RATE: 95 BPM | WEIGHT: 196.2 LBS | SYSTOLIC BLOOD PRESSURE: 124 MMHG | OXYGEN SATURATION: 99 % | HEIGHT: 64 IN | BODY MASS INDEX: 33.49 KG/M2 | DIASTOLIC BLOOD PRESSURE: 78 MMHG

## 2022-10-26 DIAGNOSIS — Z79.4 TYPE 2 DIABETES MELLITUS WITH HYPERGLYCEMIA, WITH LONG-TERM CURRENT USE OF INSULIN: Primary | ICD-10-CM

## 2022-10-26 DIAGNOSIS — E11.65 TYPE 2 DIABETES MELLITUS WITH HYPERGLYCEMIA, WITH LONG-TERM CURRENT USE OF INSULIN: Primary | ICD-10-CM

## 2022-10-26 PROCEDURE — 99213 OFFICE O/P EST LOW 20 MIN: CPT | Performed by: HOSPITALIST

## 2022-10-26 RX ORDER — INSULIN ASPART 100 [IU]/ML
INJECTION, SUSPENSION SUBCUTANEOUS
Qty: 30 ML | Refills: 3
Start: 2022-10-26 | End: 2022-11-09 | Stop reason: SDUPTHER

## 2022-10-26 RX ORDER — SEMAGLUTIDE 2.68 MG/ML
2 INJECTION, SOLUTION SUBCUTANEOUS WEEKLY
Qty: 3 ML | Refills: 3 | Status: SHIPPED | OUTPATIENT
Start: 2022-10-26

## 2022-10-26 NOTE — ASSESSMENT & PLAN NOTE
-Patient with significant improvement in her glycemic control since last visit  -She continues to be very worried about hyperglycemia although her glucoses are very close to goal at this time  -She would very much like to target a lower glucose goal of 100 to 120 mg/dL, I cautioned her on the risk of this particularly the risk of hypoglycemia which can lead to heart attack, stroke and death but despite these concerns she was adamant that she would like to target a lower glycemic goal  -Recommend increasing her 70/30 to 22 units in the morning and 55 units with supper  -Recommend increasing her Ozempic to 2 mg weekly  -Recommend she continue to check her blood glucose twice daily and as needed for symptoms of hypoglycemia or hyperglycemia  -Counseled on when to check for hypoglycemia and how to treat it    DM Health Maintenance:  Ophtho: last done 5/2021 and has diabetic retinopathy that is mild, she is looking for a new ophthalmologist still, will again follow-up at next visit to see if she has scheduled this  Monofilament / Foot exam: abnormal, done 10/19/2022  Lipids/Statin: taking a statin and Zetia with last FLP showing LDL 72, total cholesterol 150, HDL 43 and triglycerides 213, recommend repeating in 3 to 6 months  RACHAEL:  Negative on 10/19/2022, repeat annually  TSH: 1.240 on 8/17/2022  Aspirin: not taking    Not applicable

## 2022-10-28 DIAGNOSIS — J30.9 ALLERGIC RHINITIS, UNSPECIFIED SEASONALITY, UNSPECIFIED TRIGGER: ICD-10-CM

## 2022-10-28 RX ORDER — CETIRIZINE HYDROCHLORIDE 10 MG/1
10 TABLET ORAL DAILY
Qty: 30 TABLET | Refills: 0 | Status: SHIPPED | OUTPATIENT
Start: 2022-10-28 | End: 2022-11-28

## 2022-10-28 NOTE — TELEPHONE ENCOUNTER
Rx Refill Note    Requested Prescriptions     Pending Prescriptions Disp Refills   • cetirizine (zyrTEC) 10 MG tablet 30 tablet 0     Sig: Take 1 tablet by mouth Daily.        Last office visit with prescribing clinician: 8/24/2022      Next office visit with prescribing clinician: 11/28/2022   Last labs:   Last refill: 08/30/2022   Pharmacy (be sure to add in Epic). correct

## 2022-10-28 NOTE — TELEPHONE ENCOUNTER
Caller: Tara Perez    Relationship: Self    Best call back number: [phone]  Requested Prescriptions:   Requested Prescriptions     Pending Prescriptions Disp Refills   • cetirizine (zyrTEC) 10 MG tablet 30 tablet 0     Sig: Take 1 tablet by mouth Daily.        Pharmacy where request should be sent:      [address] - [phone]  - [phone] FX        Additional details provided by patient:    Does the patient have less than a 3 day supply:  [x] Yes  [] No    Radha Jaimes Rep   10/28/22 08:43 EDT

## 2022-10-31 ENCOUNTER — TELEPHONE (OUTPATIENT)
Dept: GENETICS | Facility: HOSPITAL | Age: 62
End: 2022-10-31

## 2022-10-31 NOTE — TELEPHONE ENCOUNTER
Called the patient regarding the genetic referral. The patient is not interested in scheduling the genetic counseling appointment.

## 2022-11-01 ENCOUNTER — PRIOR AUTHORIZATION (OUTPATIENT)
Dept: ENDOCRINOLOGY | Facility: CLINIC | Age: 62
End: 2022-11-01

## 2022-11-01 NOTE — TELEPHONE ENCOUNTER
WINNIE CUELLO (Key: [id number])  Rx #: [id number]  Ozempic (2 MG/DOSE) 8MG/3ML pen-injectors     Form  [employer] [place] [employer] ePA Form 2017 NCPDP  Created  6 days ago  Sent to Plan  2 minutes ago  Plan Response  2 minutes ago  Submit Clinical Questions  Determination  Message from Plan  Member should be able to get the drug/product without a PA at this time.

## 2022-11-03 RX ORDER — ATORVASTATIN CALCIUM 80 MG/1
80 TABLET, FILM COATED ORAL DAILY
Qty: 30 TABLET | Refills: 0 | Status: SHIPPED | OUTPATIENT
Start: 2022-11-03 | End: 2022-12-03

## 2022-11-03 NOTE — TELEPHONE ENCOUNTER
Caller: Tara Perez    Relationship: Self    Best call back number: [phone]    Requested Prescriptions:   Requested Prescriptions     Pending Prescriptions Disp Refills   • atorvastatin (LIPITOR) 80 MG tablet 30 tablet 0     Sig: Take 1 tablet by mouth Daily.        Pharmacy where request should be sent: [address] - [phone]  - [phone] FX     Additional details provided by patient: PLEASE REFILL     Does the patient have less than a 3 day supply:  [] Yes  [x] No    Radha Celeste Rep   11/03/22 10:24 EDT

## 2022-11-03 NOTE — TELEPHONE ENCOUNTER
Rx Refill Note    Requested Prescriptions     Pending Prescriptions Disp Refills   • atorvastatin (LIPITOR) 80 MG tablet 30 tablet 0     Sig: Take 1 tablet by mouth Daily.        Last office visit with prescribing clinician: 8/24/2022      Next office visit with prescribing clinician: 11/28/2022   Last labs:   Last refill: 10/07/2022   Pharmacy (be sure to add in Epic). correct

## 2022-11-09 DIAGNOSIS — T45.1X5A CHEMOTHERAPY-INDUCED NEUROPATHY: ICD-10-CM

## 2022-11-09 DIAGNOSIS — Z79.4 TYPE 2 DIABETES MELLITUS WITH DIABETIC NEUROPATHY, WITH LONG-TERM CURRENT USE OF INSULIN: ICD-10-CM

## 2022-11-09 DIAGNOSIS — G62.0 CHEMOTHERAPY-INDUCED NEUROPATHY: ICD-10-CM

## 2022-11-09 DIAGNOSIS — E11.40 TYPE 2 DIABETES MELLITUS WITH DIABETIC NEUROPATHY, WITH LONG-TERM CURRENT USE OF INSULIN: ICD-10-CM

## 2022-11-09 DIAGNOSIS — E11.65 TYPE 2 DIABETES MELLITUS WITH HYPERGLYCEMIA, WITH LONG-TERM CURRENT USE OF INSULIN: ICD-10-CM

## 2022-11-09 DIAGNOSIS — Z79.4 TYPE 2 DIABETES MELLITUS WITH HYPERGLYCEMIA, WITH LONG-TERM CURRENT USE OF INSULIN: ICD-10-CM

## 2022-11-09 RX ORDER — INSULIN ASPART 100 [IU]/ML
INJECTION, SUSPENSION SUBCUTANEOUS
Qty: 30 ML | Refills: 5 | Status: SHIPPED | OUTPATIENT
Start: 2022-11-09 | End: 2022-11-16 | Stop reason: SDUPTHER

## 2022-11-09 NOTE — TELEPHONE ENCOUNTER
Patient said the pharmacy need the new Rx of NovoLOG because of the increase in dosage. She would like a call back.

## 2022-11-09 NOTE — TELEPHONE ENCOUNTER
Rx Refill Note    Requested Prescriptions     Pending Prescriptions Disp Refills   • gabapentin (NEURONTIN) 400 MG capsule 30 capsule 0     Sig: Take 1 capsule by mouth every night at bedtime.        Last office visit with prescribing clinician: 8/24/2022      Next office visit with prescribing clinician: 11/28/2022   Last labs:   Last refill: 10/07/2022   Pharmacy (be sure to add in Epic). correct

## 2022-11-09 NOTE — TELEPHONE ENCOUNTER
Caller: Tara Perez    Relationship: Self    Best call back number: [phone]    Requested Prescriptions:   Requested Prescriptions     Pending Prescriptions Disp Refills   • gabapentin (NEURONTIN) 400 MG capsule 30 capsule 0     Sig: Take 1 capsule by mouth every night at bedtime.        Pharmacy where request should be sent: [address] - [phone]  - [phone] FX     Additional details provided by patient: PATIENT ADVISES THAT SHE HAS EXACTLY A 3 DAY SUPPLY.    Does the patient have less than a 3 day supply:  [] Yes  [x] No    Radha Gonzalez Rep   11/09/22 09:09 EST

## 2022-11-11 DIAGNOSIS — F33.41 MAJOR DEPRESSIVE DISORDER, RECURRENT EPISODE, IN PARTIAL REMISSION: ICD-10-CM

## 2022-11-11 RX ORDER — CARBAMAZEPINE 200 MG/1
TABLET ORAL
Qty: 60 TABLET | Refills: 0 | Status: SHIPPED | OUTPATIENT
Start: 2022-11-11 | End: 2022-12-09

## 2022-11-11 NOTE — PROGRESS NOTES
Chief complaint/Reason for consult: Type 2 diabetes mellitus    HPI: Ms. Perez is a 61-year-old female with type 2 diabetes, history of breast cancer, hypertension, depression and mixed hyperlipidemia who comes for follow-up of type 2 diabetes.  She was last seen in this office on 10/26/2022 and reports since that time doing well aside from running out of 70/30 insulin for about a week and using her old Levemir prescription, she has since gotten a new prescription of 70/30.      # [id number], controlled with complications  # Diabetic retinopathy   - Diagnosed: around 2014   - Current regimen includes: 70/30 22 units with breakfast and 55 units with supper and Ozempic 2 mg weekly  - Tried metformin in the past but had intolerable GI issues   - Had a lot of yeast infections so SGLT2 inhibitors were stopped  - Compliance with medications is good, rarely misses any doses   - Eats one main meal per day (dinner), a smaller breakfast and has some snacks during the day   - She continues to have regular soda at bedtime with her medications and reports if she does not do this her medications will not work but reports she has cut back significantly on her regular soda intake  - Access to food is an issue due to her fixed income  - HbA1c: 7.2% on 8/17/2022, today 7.0% with goal of 7% or less  - Checks FSBG  twice daily, blood glucose log shows:      AM glucose mid 100s  PM glucose   more variable with range of 87 mg/dL to 223 mg/dL with majority in low to mid 100s     - Hypoglycemia is not occurring   - Hyperglycemia occurs with poor food choices or if she has 2 sodas at night instead of 1  - Patient reports 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), stable on gabapentin  - Patient denies gastroparesis   - Patient reports rotating injection sites reports no insulin is leaking   - Patient without known ASCVD   - taking ACEi; blood pressure today 124/72      DM Health Maintenance:  Ophtho: done 10/2022 and reports nonproliferative  "retinopathy that is stable (see media tab)   Monofilament / Foot exam: abnormal, done 10/19/2022, has diabetic shoes which has helped a lot  Lipids/Statin: taking a statin and Zetia with last FLP showing LDL 72, total cholesterol 150, HDL 43 and triglycerides 213, due for repeat in 3-4 months  RACHAEL:  Negative on 10/19/2022  TSH: 1.240 on 8/17/2022  Aspirin: not taking      # Mixed hyperlipidemia  - Compliant with atorvastatin 80 mg daily and ezetimibe 10 mg daily  - FLP showing LDL 72, total cholesterol 150, HDL 43 and triglycerides 213 on 8/22    Past medical history, past surgical history, family history and social history reviewed within this encounter.     Review of Systems   Constitutional: Negative for activity change and unexpected weight change.   HENT: Negative for trouble swallowing.    Eyes: Negative for visual disturbance.   Respiratory: Negative for shortness of breath.    Cardiovascular: Negative for chest pain.   Gastrointestinal: Negative for abdominal pain.   Endocrine: Negative for cold intolerance and heat intolerance.   Skin: Negative for rash.   Neurological: Negative for dizziness.   Psychiatric/Behavioral: Negative for agitation and confusion.        /72 (BP Location: Left arm, Patient Position: Sitting, Cuff Size: Adult)   Pulse 80   Ht 162.6 cm (64\")   Wt 89.4 kg (197 lb)   SpO2 97%   BMI 33.81 kg/m²      Physical Exam  Vitals reviewed.   Constitutional:       General: She is not in acute distress.     Appearance: She is obese.   HENT:      Head: Normocephalic.   Eyes:      Conjunctiva/sclera: Conjunctivae normal.   Cardiovascular:      Rate and Rhythm: Normal rate.   Pulmonary:      Effort: Pulmonary effort is normal.   Abdominal:      Tenderness: There is no guarding.   Musculoskeletal:         General: Normal range of motion.   Skin:     General: Skin is warm and dry.   Neurological:      Mental Status: She is alert and oriented to person, place, and time.   Psychiatric:         " Mood and Affect: Mood normal.         Thought Content: Thought content normal.        Labs and images reviewed as noted in the HPI    Assessment and plan:    Diagnoses and all orders for this visit:    1. 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 from her prior history of breast cancer  -Discussed with the patient that she is at goal for her glycemic control and if she wants to titrate her insulin further for lower blood glucoses she increases the risk of hypoglycemia which can be dangerous  -Given patient has no episodes of hypoglycemia at this time we will cautiously increase her insulin, recommend going back to her current dose if she has blood sugars consistently less than 110 mg/dL  -70/30 at 25 units with breakfast and 60 units with supper, give a half dose if she does not eat  -Check fingerstick blood glucose twice daily and as needed for symptoms of hypoglycemia or hyperglycemia, will try to get freestyle etienne approved if able  -Counseled that long term hyperglycemia can cause worsening neuropathy, kidney damage, eye damage, and cardiovascular disease  -Counseled that hypoglycemia can lead to heart attack, stroke and death; patient is aware of symptoms of hypoglycemia, how and when to check for hypoglycemia and how to treat hypoglycemia  -Continue Ozempic 2 mg weekly    DM Health Maintenance:  Ophtho: done 10/2022 and reports nonproliferative retinopathy that is stable   Monofilament / Foot exam: abnormal, done 10/19/2022, has diabetic shoes which has helped a lot  Lipids/Statin: Continue atorvastatin 80 mg daily and Zetia 10 mg daily with last FLP showing LDL 72, total  cholesterol 150, HDL 43 and triglycerides 213, recommend repeating fasting lipid panel at next visit and consider adding fenofibrate given her history of type 2 diabetes with retinopathy and slightly elevated triglycerides  RACHAEL:  Negative on 10/19/2022, repeat annually  TSH: 1.240 on 8/17/2022, repeat annually  Aspirin: not taking     Orders:  -     POC Glycosylated Hemoglobin (Hb A1C)  -     insulin aspart prot & aspart (NovoLOG 70/30 FlexPen ReliOn) (70-30) 100 UNIT/ML suspension pen-injector injection; Inject 25u under the skin before breakfast and 60u under the skin before dinner, titrate dose as directed for max daily dose 100u  Dispense: 30 mL; Refill: 5    2. Mixed hyperlipidemia  Assessment & Plan:  -Continue atorvastatin 80 mg daily and ezetimibe 10 mg daily  -Repeat fasting lipid panel at next visit  -Consider adding fenofibrate at next visit if triglycerides being elevated given her history of type 2 diabetes with retinopathy        Return in about 3 months (around 2/16/2023) for T2DM with labs .     I spent 35 minutes caring for Tara on this date of service. This time includes time spent by me in the following activities: preparing for the visit, reviewing tests, performing a medically appropriate examination and/or evaluation, counseling and educating the patient/family/caregiver, ordering medications, tests, or procedures and documenting information in the medical record     Electronically signed by: Kyle S Rosenstein, MD

## 2022-11-11 NOTE — TELEPHONE ENCOUNTER
Rx Refill Note    Requested Prescriptions     Pending Prescriptions Disp Refills   • carBAMazepine (TEGretol) 200 MG tablet [Pharmacy Med Name: CARBAMAZEPINE 200 MG TABLET] 60 tablet 0     Sig: TAKE ONE TABLET BY MOUTH 2 TIMES A DAY.        Last office visit with prescribing clinician: 8/24/2022      Next office visit with prescribing clinician: 11/28/2022   Last labs:   Last refill: 09/15/2022   Pharmacy (be sure to add in Epic). correct

## 2022-11-14 DIAGNOSIS — T14.8XXA MUSCLE STRAIN: ICD-10-CM

## 2022-11-14 RX ORDER — GABAPENTIN 400 MG/1
400 CAPSULE ORAL
Qty: 30 CAPSULE | Refills: 0 | Status: SHIPPED | OUTPATIENT
Start: 2022-11-14 | End: 2022-12-13 | Stop reason: SDUPTHER

## 2022-11-14 NOTE — TELEPHONE ENCOUNTER
PT CALLED TO CHECK ON STATUS REFILL RX GABAPENTIN REFILL. STATED THAT SHE IS OUT OF RX.    PLEASE ADVISE.CALL BACK:[phone]    [facility] - [place] [address] [address]  - [phone]  - [phone] FX

## 2022-11-15 RX ORDER — CYCLOBENZAPRINE HCL 10 MG
TABLET ORAL
Qty: 30 TABLET | Refills: 0 | Status: SHIPPED | OUTPATIENT
Start: 2022-11-15 | End: 2022-12-13

## 2022-11-16 ENCOUNTER — OFFICE VISIT (OUTPATIENT)
Dept: ENDOCRINOLOGY | Facility: CLINIC | Age: 62
End: 2022-11-16

## 2022-11-16 VITALS
WEIGHT: 197 LBS | HEIGHT: 64 IN | BODY MASS INDEX: 33.63 KG/M2 | OXYGEN SATURATION: 97 % | SYSTOLIC BLOOD PRESSURE: 124 MMHG | DIASTOLIC BLOOD PRESSURE: 72 MMHG | HEART RATE: 80 BPM

## 2022-11-16 DIAGNOSIS — E11.65 TYPE 2 DIABETES MELLITUS WITH HYPERGLYCEMIA, WITH LONG-TERM CURRENT USE OF INSULIN: Primary | ICD-10-CM

## 2022-11-16 DIAGNOSIS — E78.2 MIXED HYPERLIPIDEMIA: ICD-10-CM

## 2022-11-16 DIAGNOSIS — Z79.4 TYPE 2 DIABETES MELLITUS WITH HYPERGLYCEMIA, WITH LONG-TERM CURRENT USE OF INSULIN: Primary | ICD-10-CM

## 2022-11-16 DIAGNOSIS — I10 ESSENTIAL HYPERTENSION, BENIGN: ICD-10-CM

## 2022-11-16 LAB
EXPIRATION DATE: NORMAL
HBA1C MFR BLD: 7 %
Lab: NORMAL

## 2022-11-16 PROCEDURE — 3051F HG A1C>EQUAL 7.0%<8.0%: CPT | Performed by: HOSPITALIST

## 2022-11-16 PROCEDURE — 83036 HEMOGLOBIN GLYCOSYLATED A1C: CPT | Performed by: HOSPITALIST

## 2022-11-16 PROCEDURE — 99214 OFFICE O/P EST MOD 30 MIN: CPT | Performed by: HOSPITALIST

## 2022-11-16 RX ORDER — INSULIN ASPART 100 [IU]/ML
INJECTION, SUSPENSION SUBCUTANEOUS
Qty: 30 ML | Refills: 5 | Status: SHIPPED | OUTPATIENT
Start: 2022-11-16

## 2022-11-16 NOTE — TELEPHONE ENCOUNTER
Rx Refill Note    Requested Prescriptions     Pending Prescriptions Disp Refills   • cloNIDine (CATAPRES) 0.1 MG tablet [Pharmacy Med Name: CLONIDINE HCL 0.1 MG TABLET] 60 tablet 0     Sig: TAKE ONE TABLET BY MOUTH 2 TIMES A DAY        Last office visit with prescribing clinician: 8/24/2022      Next office visit with prescribing clinician: 11/28/2022   Last labs:   Last refill: 10/19/2022   Pharmacy (be sure to add in Epic). correct

## 2022-11-16 NOTE — ASSESSMENT & PLAN NOTE
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 from her prior history of breast cancer  -Discussed with the patient that she is at goal for her glycemic control and if she wants to titrate her insulin further for lower blood glucoses she increases the risk of hypoglycemia which can be dangerous  -Given patient has no episodes of hypoglycemia at this time we will cautiously increase her insulin, recommend going back to her current dose if she has blood sugars consistently less than 110 mg/dL  -70/30 at 25 units with breakfast and 60 units with supper, give a half dose if she does not eat  -Check fingerstick blood glucose twice daily and as needed for symptoms of hypoglycemia or hyperglycemia, will try to get freestyle etienne approved if able  -Counseled that long term hyperglycemia can cause worsening neuropathy, kidney damage, eye damage, and cardiovascular disease  -Counseled that hypoglycemia can lead to heart attack, stroke and death; patient is aware of symptoms of hypoglycemia, how and when to check for hypoglycemia and how to treat hypoglycemia  -Continue Ozempic 2 mg weekly    DM Health Maintenance:  Ophtho: done 10/2022 and reports nonproliferative retinopathy that is stable   Monofilament / Foot exam: abnormal, done 10/19/2022, has diabetic shoes which has helped a lot  Lipids/Statin: Continue atorvastatin 80 mg daily and Zetia 10 mg daily with last FLP showing LDL 72, total cholesterol 150, HDL 43 and triglycerides 213, recommend repeating fasting lipid panel at next visit and consider adding fenofibrate given her history of type 2 diabetes with retinopathy and slightly elevated triglycerides  RACHAEL:  Negative on 10/19/2022, repeat annually  TSH: 1.240 on 8/17/2022, repeat annually  Aspirin: not  taking

## 2022-11-16 NOTE — ASSESSMENT & PLAN NOTE
-Continue atorvastatin 80 mg daily and ezetimibe 10 mg daily  -Repeat fasting lipid panel at next visit  -Consider adding fenofibrate at next visit if triglycerides being elevated given her history of type 2 diabetes with retinopathy

## 2022-11-17 RX ORDER — CLONIDINE HYDROCHLORIDE 0.1 MG/1
TABLET ORAL
Qty: 60 TABLET | Refills: 0 | Status: SHIPPED | OUTPATIENT
Start: 2022-11-17 | End: 2022-12-15

## 2022-11-25 DIAGNOSIS — J30.9 ALLERGIC RHINITIS, UNSPECIFIED SEASONALITY, UNSPECIFIED TRIGGER: ICD-10-CM

## 2022-11-28 ENCOUNTER — OFFICE VISIT (OUTPATIENT)
Dept: FAMILY MEDICINE CLINIC | Facility: CLINIC | Age: 62
End: 2022-11-28

## 2022-11-28 VITALS
HEART RATE: 87 BPM | WEIGHT: 189.8 LBS | DIASTOLIC BLOOD PRESSURE: 84 MMHG | BODY MASS INDEX: 32.58 KG/M2 | OXYGEN SATURATION: 98 % | SYSTOLIC BLOOD PRESSURE: 122 MMHG

## 2022-11-28 DIAGNOSIS — J30.9 ALLERGIC RHINITIS, UNSPECIFIED SEASONALITY, UNSPECIFIED TRIGGER: ICD-10-CM

## 2022-11-28 DIAGNOSIS — Z79.4 TYPE 2 DIABETES MELLITUS WITH HYPERGLYCEMIA, WITH LONG-TERM CURRENT USE OF INSULIN: ICD-10-CM

## 2022-11-28 DIAGNOSIS — Z85.3 HISTORY OF BILATERAL BREAST CANCER: Chronic | ICD-10-CM

## 2022-11-28 DIAGNOSIS — J30.2 SEASONAL ALLERGIC RHINITIS, UNSPECIFIED TRIGGER: Primary | ICD-10-CM

## 2022-11-28 DIAGNOSIS — E11.65 TYPE 2 DIABETES MELLITUS WITH HYPERGLYCEMIA, WITH LONG-TERM CURRENT USE OF INSULIN: ICD-10-CM

## 2022-11-28 PROCEDURE — 99214 OFFICE O/P EST MOD 30 MIN: CPT | Performed by: FAMILY MEDICINE

## 2022-11-28 RX ORDER — CETIRIZINE HYDROCHLORIDE 10 MG/1
TABLET ORAL
Qty: 30 TABLET | Refills: 0 | Status: SHIPPED | OUTPATIENT
Start: 2022-11-28 | End: 2022-11-28 | Stop reason: SDUPTHER

## 2022-11-28 RX ORDER — CETIRIZINE HYDROCHLORIDE 10 MG/1
10 TABLET ORAL DAILY
Qty: 30 TABLET | Refills: 0 | Status: SHIPPED | OUTPATIENT
Start: 2022-11-28 | End: 2022-12-27

## 2022-11-28 NOTE — PROGRESS NOTES
Chief Complaint  Diabetes, Depression, and Hypertension    Subjective          Tara Perez presents to [facility]  History of Present Illness  Patient is a 61-year-old female who already follows up with endocrinology for her diabetes her current regimen is 25 units of insulin in the morning with 60 units along with Ozempic her last A1c was down to 7.    Would like refills of her cetirizine she is otherwise doing well no major questions concerns issues at this time.      Objective   Vital Signs:   /84 (BP Location: Right arm, Patient Position: Sitting, Cuff Size: Adult)   Pulse 87   Wt 86.1 kg (189 lb 12.8 oz)   SpO2 98%   BMI 32.58 kg/m²     Body mass index is 32.58 kg/m².    Review of Systems   Constitutional: Negative.    HENT: Negative.    Eyes: Negative.    Respiratory: Negative.    Cardiovascular: Negative.    Gastrointestinal: Negative.    Endocrine: Negative.    Genitourinary: Negative.    Musculoskeletal: Negative.    Skin: Negative.    Allergic/Immunologic: Negative.    Neurological: Negative.    Hematological: Negative.    Psychiatric/Behavioral: Negative.        Past History:  Medical History: has a past medical history of Type 2 diabetes mellitus (HCC).   Surgical History: has a past surgical history that includes Appendectomy; Tonsillectomy and adenoidectomy; and Mastectomy.   Family History: family history includes Diabetes in her brother, father, and sister.   Social History: reports that she quit smoking about 30 years ago. Her smoking use included cigarettes. She started smoking about 47 years ago. She has a 17.00 pack-year smoking history. She has never used smokeless tobacco. She reports that she does not drink alcohol and does not use drugs.      Current Outpatient Medications:   •  amLODIPine-benazepril (LOTREL) 5-40 MG per capsule, TAKE ONE CAPSULE BY MOUTH EVERY DAY, Disp: 30 capsule, Rfl: 2  •  atorvastatin (LIPITOR) 80 MG tablet, Take 1 tablet by  mouth Daily., Disp: 30 tablet, Rfl: 0  •  carBAMazepine (TEGretol) 200 MG tablet, TAKE ONE TABLET BY MOUTH 2 TIMES A DAY., Disp: 60 tablet, Rfl: 0  •  cetirizine (zyrTEC) 10 MG tablet, Take 1 tablet by mouth Daily., Disp: 30 tablet, Rfl: 0  •  cloNIDine (CATAPRES) 0.1 MG tablet, TAKE ONE TABLET BY MOUTH 2 TIMES A DAY, Disp: 60 tablet, Rfl: 0  •  Continuous Blood Gluc  (FreeStyle Alon 2 Durand) device, 1 each Daily., Disp: 1 each, Rfl: 0  •  Continuous Blood Gluc Sensor (FreeStyle Alon 2 Sensor) misc, 1 each Every 14 (Fourteen) Days., Disp: 6 each, Rfl: 2  •  cyclobenzaprine (FLEXERIL) 10 MG tablet, TAKE ONE TABLET BY MOUTH EVERY EVENING AT BEDTIME, Disp: 30 tablet, Rfl: 0  •  Easy Touch Lancets 30G/Twist misc, USE AS DIRECTED TO TEST BLOOD SUGAR 3 TIMES DAILY, Disp: , Rfl:   •  ezetimibe (ZETIA) 10 MG tablet, Take 10 mg by mouth every night at bedtime., Disp: , Rfl:   •  gabapentin (NEURONTIN) 400 MG capsule, Take 1 capsule by mouth every night at bedtime., Disp: 30 capsule, Rfl: 0  •  insulin aspart prot & aspart (NovoLOG 70/30 FlexPen ReliOn) (70-30) 100 UNIT/ML suspension pen-injector injection, Inject 25u under the skin before breakfast and 60u under the skin before dinner, titrate dose as directed for max daily dose 100u, Disp: 30 mL, Rfl: 5  •  Insulin Pen Needle (B-D ULTRAFINE III SHORT PEN) 31G X 8 MM misc, Use as directed twice daily, Disp: 90 each, Rfl: 11  •  OneTouch Ultra test strip, Use as instructed to test twice daily, Disp: 100 each, Rfl: 11  •  PARoxetine (PAXIL) 40 MG tablet, TAKE ONE TABLET BY MOUTH EVERY DAY, Disp: 90 tablet, Rfl: 0  •  Semaglutide, 2 MG/DOSE, (Ozempic, 2 MG/DOSE,) 8 MG/3ML solution pen-injector, Inject 2 mg under the skin into the appropriate area as directed 1 (One) Time Per Week., Disp: 3 mL, Rfl: 3  •  vitamin D (ERGOCALCIFEROL) 1.25 MG (96062 UT) capsule capsule, Take 50,000 Units by mouth 2 (Two) Times a Week., Disp: , Rfl:     Allergies: Penicillins    Physical  Exam  Constitutional:       Appearance: Normal appearance. She is normal weight.   HENT:      Head: Normocephalic and atraumatic.      Right Ear: Tympanic membrane, ear canal and external ear normal.      Left Ear: Tympanic membrane, ear canal and external ear normal.      Nose: Nose normal.      Mouth/Throat:      Mouth: Mucous membranes are moist.      Pharynx: Oropharynx is clear.   Eyes:      Extraocular Movements: Extraocular movements intact.      Conjunctiva/sclera: Conjunctivae normal.      Pupils: Pupils are equal, round, and reactive to light.   Cardiovascular:      Rate and Rhythm: Normal rate and regular rhythm.      Pulses: Normal pulses.      Heart sounds: Normal heart sounds.   Pulmonary:      Effort: Pulmonary effort is normal.      Breath sounds: Normal breath sounds.   Abdominal:      General: Abdomen is flat. Bowel sounds are normal.      Palpations: Abdomen is soft.   Genitourinary:     Rectum: Normal.   Musculoskeletal:         General: Normal range of motion.      Cervical back: Normal range of motion and neck supple.   Skin:     General: Skin is warm and dry.   Neurological:      General: No focal deficit present.      Mental Status: She is alert and oriented to person, place, and time. Mental status is at baseline.   Psychiatric:         Mood and Affect: Mood normal.         Behavior: Behavior normal.         Thought Content: Thought content normal.         Judgment: Judgment normal.          Result Review :                   Assessment and Plan    Diagnoses and all orders for this visit:    1. Seasonal allergic rhinitis, unspecified trigger (Primary)  Stable refill cetirizine     2. Type 2 diabetes mellitus with hyperglycemia, with long-term current use of insulin (HCC)  STABLE FOLLOW WITH ENDOCRINOLOGY    3. History of bilateral breast cancer  Chronic in nature    4. Allergic rhinitis, unspecified seasonality, unspecified trigger  -     cetirizine (zyrTEC) 10 MG tablet; Take 1 tablet by  mouth Daily.  Dispense: 30 tablet; Refill: 0    MEDICATION SIDE EFFECTS, RISKS AND SIDE EFFECTS HAVE BEEN DISCUSSED WITH PATIENT. PATIENT NOTES UNDERSTANDING. IF ANY CONCERN OR QUESTION REGARDING MEDICATION PLEASE CONTACT.       Follow Up   No follow-ups on file.  Patient was given instructions and counseling regarding her condition or for health maintenance advice. Please see specific information pulled into the AVS if appropriate.     Naomi Sandy DO

## 2022-12-03 NOTE — TELEPHONE ENCOUNTER
Rx Refill Note    Requested Prescriptions     Pending Prescriptions Disp Refills   • atorvastatin (LIPITOR) 80 MG tablet [Pharmacy Med Name: ATORVASTATIN 80 MG TABLET] 30 tablet 0     Sig: TAKE ONE TABLET BY MOUTH EVERY DAY        Last office visit with prescribing clinician: 11/28/2022      Next office visit with prescribing clinician: 5/30/2023   Last labs:   Last refill:    Pharmacy [facility]

## 2022-12-04 RX ORDER — ATORVASTATIN CALCIUM 80 MG/1
TABLET, FILM COATED ORAL
Qty: 30 TABLET | Refills: 5 | Status: SHIPPED | OUTPATIENT
Start: 2022-12-04 | End: 2023-02-22 | Stop reason: SDUPTHER

## 2022-12-09 DIAGNOSIS — F33.41 MAJOR DEPRESSIVE DISORDER, RECURRENT EPISODE, IN PARTIAL REMISSION: ICD-10-CM

## 2022-12-09 RX ORDER — CARBAMAZEPINE 200 MG/1
TABLET ORAL
Qty: 60 TABLET | Refills: 0 | Status: SHIPPED | OUTPATIENT
Start: 2022-12-09 | End: 2023-01-06

## 2022-12-13 DIAGNOSIS — E11.40 TYPE 2 DIABETES MELLITUS WITH DIABETIC NEUROPATHY, WITH LONG-TERM CURRENT USE OF INSULIN: ICD-10-CM

## 2022-12-13 DIAGNOSIS — G62.0 CHEMOTHERAPY-INDUCED NEUROPATHY: ICD-10-CM

## 2022-12-13 DIAGNOSIS — T14.8XXA MUSCLE STRAIN: ICD-10-CM

## 2022-12-13 DIAGNOSIS — Z79.4 TYPE 2 DIABETES MELLITUS WITH DIABETIC NEUROPATHY, WITH LONG-TERM CURRENT USE OF INSULIN: ICD-10-CM

## 2022-12-13 DIAGNOSIS — T45.1X5A CHEMOTHERAPY-INDUCED NEUROPATHY: ICD-10-CM

## 2022-12-13 RX ORDER — GABAPENTIN 400 MG/1
400 CAPSULE ORAL
Qty: 30 CAPSULE | Refills: 0 | Status: SHIPPED | OUTPATIENT
Start: 2022-12-13 | End: 2023-01-10 | Stop reason: SDUPTHER

## 2022-12-13 RX ORDER — CYCLOBENZAPRINE HCL 10 MG
TABLET ORAL
Qty: 30 TABLET | Refills: 0 | Status: SHIPPED | OUTPATIENT
Start: 2022-12-13 | End: 2023-01-11

## 2022-12-13 NOTE — TELEPHONE ENCOUNTER
Caller: Tara Perez    Relationship: Self    Best call back number:   [phone]   Requested Prescriptions:   Requested Prescriptions     Pending Prescriptions Disp Refills   • gabapentin (NEURONTIN) 400 MG capsule 30 capsule 0     Sig: Take 1 capsule by mouth every night at bedtime.        Pharmacy where request should be sent: [address] - [phone]  - [phone] FX     Additional details provided by patient:     Does the patient have less than a 3 day supply:  [x] Yes  [] No    Would you like a call back once the refill request has been completed:   [] Yes [x] No     If the office needs to give you a call back, can they leave a voicemail:   [] Yes [x] No

## 2022-12-13 NOTE — TELEPHONE ENCOUNTER
Rx Refill Note    Requested Prescriptions     Pending Prescriptions Disp Refills   • gabapentin (NEURONTIN) 400 MG capsule 30 capsule 0     Sig: Take 1 capsule by mouth every night at bedtime.        Last office visit with prescribing clinician: 11/28/2022      Next office visit with prescribing clinician: 5/30/2023   Last labs:   Last refill: 11/14/2022   Pharmacy (be sure to add in Epic). correct

## 2022-12-13 NOTE — TELEPHONE ENCOUNTER
Rx Refill Note    Requested Prescriptions     Pending Prescriptions Disp Refills   • cyclobenzaprine (FLEXERIL) 10 MG tablet [Pharmacy Med Name: CYCLOBENZAPRINE 10 MG TABLET] 30 tablet 0     Sig: TAKE ONE TABLET BY MOUTH EVERY EVENING AT BEDTIME        Last office visit with prescribing clinician: 11/28/2022      Next office visit with prescribing clinician: 5/30/2023   Last labs:   Last refill: 11/15/2022   Pharmacy (be sure to add in Epic). correct

## 2022-12-15 DIAGNOSIS — I10 ESSENTIAL HYPERTENSION, BENIGN: ICD-10-CM

## 2022-12-15 NOTE — TELEPHONE ENCOUNTER
Rx Refill Note    Requested Prescriptions     Pending Prescriptions Disp Refills   • cloNIDine (CATAPRES) 0.1 MG tablet [Pharmacy Med Name: CLONIDINE HCL 0.1 MG TABLET] 60 tablet 0     Sig: TAKE ONE TABLET BY MOUTH 2 TIMES A DAY        Last office visit with prescribing clinician: 11/28/2022      Next office visit with prescribing clinician: 5/30/2023   Last labs:   Last refill:    Pharmacy [facility]

## 2022-12-16 RX ORDER — CLONIDINE HYDROCHLORIDE 0.1 MG/1
TABLET ORAL
Qty: 60 TABLET | Refills: 5 | Status: SHIPPED | OUTPATIENT
Start: 2022-12-16

## 2022-12-21 ENCOUNTER — TELEPHONE (OUTPATIENT)
Dept: ENDOCRINOLOGY | Facility: CLINIC | Age: 62
End: 2022-12-21

## 2022-12-21 DIAGNOSIS — I10 PRIMARY HYPERTENSION: ICD-10-CM

## 2022-12-21 RX ORDER — PROCHLORPERAZINE 25 MG/1
SUPPOSITORY RECTAL
Qty: 3 EACH | Refills: 5 | Status: SHIPPED | OUTPATIENT
Start: 2022-12-21 | End: 2023-02-22 | Stop reason: SDUPTHER

## 2022-12-21 RX ORDER — PROCHLORPERAZINE 25 MG/1
1 SUPPOSITORY RECTAL TAKE AS DIRECTED
Qty: 1 EACH | Refills: 0 | Status: SHIPPED | OUTPATIENT
Start: 2022-12-21

## 2022-12-21 RX ORDER — PROCHLORPERAZINE 25 MG/1
1 SUPPOSITORY RECTAL
Qty: 1 EACH | Refills: 1 | Status: SHIPPED | OUTPATIENT
Start: 2022-12-21 | End: 2023-02-22 | Stop reason: SDUPTHER

## 2022-12-21 RX ORDER — AMLODIPINE BESYLATE AND BENAZEPRIL HYDROCHLORIDE 5; 40 MG/1; MG/1
CAPSULE ORAL
Qty: 30 CAPSULE | Refills: 0 | Status: SHIPPED | OUTPATIENT
Start: 2022-12-21 | End: 2023-01-18

## 2022-12-21 NOTE — TELEPHONE ENCOUNTER
Caller: Tara Perez    Relationship: Self    Best call back number: [phone]    Requested Prescriptions:   Requested Prescriptions     Pending Prescriptions Disp Refills   • amLODIPine-benazepril (LOTREL) 5-40 MG per capsule 30 capsule 2     Sig: TAKE ONE CAPSULE BY MOUTH EVERY DAY        Pharmacy where request should be sent: [address] - [phone]  - [phone] FX     Additional details provided by patient: COMPLETELY OUT    Does the patient have less than a 3 day supply:  [x] Yes  [] No    Would you like a call back once the refill request has been completed: [] Yes [x] No    If the office needs to give you a call back, can they leave a voicemail: [] Yes [x] No    Radha Weathers Rep   12/21/22 08:56 EST

## 2022-12-21 NOTE — TELEPHONE ENCOUNTER
Rx Refill Note    Requested Prescriptions     Pending Prescriptions Disp Refills   • amLODIPine-benazepril (LOTREL) 5-40 MG per capsule 30 capsule 0     Sig: TAKE ONE CAPSULE BY MOUTH EVERY DAY        Last office visit with prescribing clinician: 11/28/2022      Next office visit with prescribing clinician: 5/30/2023   Last labs:   Last refill: 09/15/2022   Pharmacy (be sure to add in Epic). correct

## 2022-12-26 DIAGNOSIS — J30.9 ALLERGIC RHINITIS, UNSPECIFIED SEASONALITY, UNSPECIFIED TRIGGER: ICD-10-CM

## 2022-12-27 RX ORDER — CETIRIZINE HYDROCHLORIDE 10 MG/1
TABLET ORAL
Qty: 30 TABLET | Refills: 0 | Status: SHIPPED | OUTPATIENT
Start: 2022-12-27 | End: 2023-01-30 | Stop reason: SDUPTHER

## 2023-01-05 ENCOUNTER — PRIOR AUTHORIZATION (OUTPATIENT)
Dept: ENDOCRINOLOGY | Facility: CLINIC | Age: 63
End: 2023-01-05
Payer: MEDICAID

## 2023-01-06 DIAGNOSIS — F33.41 MAJOR DEPRESSIVE DISORDER, RECURRENT EPISODE, IN PARTIAL REMISSION: ICD-10-CM

## 2023-01-06 RX ORDER — CARBAMAZEPINE 200 MG/1
TABLET ORAL
Qty: 60 TABLET | Refills: 0 | Status: SHIPPED | OUTPATIENT
Start: 2023-01-06 | End: 2023-02-03

## 2023-01-06 NOTE — TELEPHONE ENCOUNTER
Rx Refill Note    Requested Prescriptions     Pending Prescriptions Disp Refills   • carBAMazepine (TEGretol) 200 MG tablet [Pharmacy Med Name: CARBAMAZEPINE 200 MG TABLET] 60 tablet 0     Sig: TAKE ONE TABLET BY MOUTH 2 TIMES A DAY.        Last office visit with prescribing clinician: Visit date not found      Next office visit with prescribing clinician: Visit date not found   Last labs:   Last refill: 12/09/2022   Pharmacy (be sure to add in Epic). correct

## 2023-01-10 DIAGNOSIS — G62.0 CHEMOTHERAPY-INDUCED NEUROPATHY: ICD-10-CM

## 2023-01-10 DIAGNOSIS — E11.40 TYPE 2 DIABETES MELLITUS WITH DIABETIC NEUROPATHY, WITH LONG-TERM CURRENT USE OF INSULIN: ICD-10-CM

## 2023-01-10 DIAGNOSIS — T45.1X5A CHEMOTHERAPY-INDUCED NEUROPATHY: ICD-10-CM

## 2023-01-10 DIAGNOSIS — Z79.4 TYPE 2 DIABETES MELLITUS WITH DIABETIC NEUROPATHY, WITH LONG-TERM CURRENT USE OF INSULIN: ICD-10-CM

## 2023-01-10 DIAGNOSIS — T14.8XXA MUSCLE STRAIN: ICD-10-CM

## 2023-01-10 NOTE — TELEPHONE ENCOUNTER
Rx Refill Note    Requested Prescriptions     Pending Prescriptions Disp Refills   • cyclobenzaprine (FLEXERIL) 10 MG tablet [Pharmacy Med Name: CYCLOBENZAPRINE 10 MG TABLET] 30 tablet 0     Sig: TAKE ONE TABLET BY MOUTH EVERY EVENING AT BEDTIME   • gabapentin (NEURONTIN) 400 MG capsule 30 capsule 0     Sig: Take 1 capsule by mouth every night at bedtime.        Last office visit with prescribing clinician: 11/28/2022      Next office visit with prescribing clinician: 5/30/2023   Last labs:   Last refill: 12/13/2022   Pharmacy (be sure to add in Epic). correct

## 2023-01-10 NOTE — TELEPHONE ENCOUNTER
Rx Refill Note    Requested Prescriptions     Pending Prescriptions Disp Refills   • cyclobenzaprine (FLEXERIL) 10 MG tablet [Pharmacy Med Name: CYCLOBENZAPRINE 10 MG TABLET] 30 tablet 0     Sig: TAKE ONE TABLET BY MOUTH EVERY EVENING AT BEDTIME        Last office visit with prescribing clinician: 11/28/2022      Next office visit with prescribing clinician: 5/30/2023   Last labs:   Last refill: 12/13/2022   Pharmacy (be sure to add in Epic). correct

## 2023-01-10 NOTE — TELEPHONE ENCOUNTER
Caller: Tara Perez    Relationship: Self    Best call back number: [phone]    Requested Prescriptions:   Requested Prescriptions     Pending Prescriptions Disp Refills   • cyclobenzaprine (FLEXERIL) 10 MG tablet [Pharmacy Med Name: CYCLOBENZAPRINE 10 MG TABLET] 30 tablet 0     Sig: TAKE ONE TABLET BY MOUTH EVERY EVENING AT BEDTIME   • gabapentin (NEURONTIN) 400 MG capsule 30 capsule 0     Sig: Take 1 capsule by mouth every night at bedtime.        Pharmacy where request should be sent: [address] - [phone] [facility] [phone] FX       Does the patient have less than a 3 day supply:  [] Yes  [x] No    Would you like a call back once the refill request has been completed: [] Yes [x] No    If the office needs to give you a call back, can they leave a voicemail: [] Yes [x] No    Radha Weathers Rep   01/10/23 11:48 EST

## 2023-01-11 RX ORDER — GABAPENTIN 400 MG/1
400 CAPSULE ORAL
Qty: 30 CAPSULE | Refills: 0 | Status: SHIPPED | OUTPATIENT
Start: 2023-01-11 | End: 2023-02-10 | Stop reason: SDUPTHER

## 2023-01-11 RX ORDER — CYCLOBENZAPRINE HCL 10 MG
TABLET ORAL
Qty: 30 TABLET | Refills: 0 | Status: SHIPPED | OUTPATIENT
Start: 2023-01-11 | End: 2023-02-08

## 2023-01-11 NOTE — TELEPHONE ENCOUNTER
Call patient and let her know her next appt needs to be in person as she is on gabapentin  Controlled substance will need to discuss transfer

## 2023-01-13 DIAGNOSIS — F33.41 MAJOR DEPRESSIVE DISORDER, RECURRENT EPISODE, IN PARTIAL REMISSION: ICD-10-CM

## 2023-01-13 RX ORDER — PAROXETINE HYDROCHLORIDE 40 MG/1
TABLET, FILM COATED ORAL
Qty: 90 TABLET | Refills: 0 | Status: SHIPPED | OUTPATIENT
Start: 2023-01-13

## 2023-01-13 NOTE — TELEPHONE ENCOUNTER
Rx Refill Note    Requested Prescriptions     Pending Prescriptions Disp Refills   • PARoxetine (PAXIL) 40 MG tablet [Pharmacy Med Name: PAROXETINE HCL 40 MG TABLET] 90 tablet 0     Sig: TAKE ONE TABLET BY MOUTH EVERY DAY        Last office visit with prescribing clinician: 11/28/2022      Next office visit with prescribing clinician: 2/10/2023   Last labs:   Last refill: 10/17/2022   Pharmacy (be sure to add in Epic). correct

## 2023-01-14 DIAGNOSIS — F33.41 MAJOR DEPRESSIVE DISORDER, RECURRENT EPISODE, IN PARTIAL REMISSION: ICD-10-CM

## 2023-01-16 RX ORDER — PAROXETINE HYDROCHLORIDE 40 MG/1
TABLET, FILM COATED ORAL
Qty: 90 TABLET | Refills: 0 | OUTPATIENT
Start: 2023-01-16

## 2023-01-18 ENCOUNTER — TELEPHONE (OUTPATIENT)
Dept: FAMILY MEDICINE CLINIC | Facility: CLINIC | Age: 63
End: 2023-01-18
Payer: MEDICAID

## 2023-01-18 DIAGNOSIS — I10 PRIMARY HYPERTENSION: ICD-10-CM

## 2023-01-18 RX ORDER — AMLODIPINE BESYLATE AND BENAZEPRIL HYDROCHLORIDE 5; 40 MG/1; MG/1
CAPSULE ORAL
Qty: 30 CAPSULE | Refills: 0 | Status: SHIPPED | OUTPATIENT
Start: 2023-01-18 | End: 2023-02-16

## 2023-01-18 NOTE — TELEPHONE ENCOUNTER
Rx Refill Note    Requested Prescriptions     Pending Prescriptions Disp Refills   • amLODIPine-benazepril (LOTREL) 5-40 MG per capsule [Pharmacy Med Name: AMLODIPINE-BENAZ 5-40 MG] 30 capsule 0     Sig: TAKE ONE CAPSULE BY MOUTH EVERY DAY        Last office visit with prescribing clinician: 11/28/2022      Next office visit with prescribing clinician: 1/18/2023   Last labs:   Last refill: 12/21/2022   Pharmacy (be sure to add in Epic). correct

## 2023-01-18 NOTE — TELEPHONE ENCOUNTER
Caller: Tara Perez    Relationship: Self    Best call back number:      [phone]     Requested Prescriptions:      amLODIPine-benazepril (LOTREL) 5-40 MG per capsule    Pharmacy where request should be sent: [address] - [phone]  - [phone] FX     Additional details provided by patient:     PATIENT STATED SHE HAS (2) CAPSULES LEFT OF MEDICATION    Does the patient have less than a 3 day supply:  [x] Yes  [] No    Would you like a call back once the refill request has been completed: [] Yes [x] No    If the office needs to give you a call back, can they leave a voicemail: [] Yes [x] No    Kiana Murray, Dasiaed Rep   01/18/23 10:17 EST     DR SCHMITT

## 2023-01-30 DIAGNOSIS — J30.9 ALLERGIC RHINITIS, UNSPECIFIED SEASONALITY, UNSPECIFIED TRIGGER: ICD-10-CM

## 2023-01-30 RX ORDER — EZETIMIBE 10 MG/1
10 TABLET ORAL
Qty: 30 TABLET | Refills: 2 | Status: SHIPPED | OUTPATIENT
Start: 2023-01-30 | End: 2023-02-22 | Stop reason: SDUPTHER

## 2023-01-30 RX ORDER — CETIRIZINE HYDROCHLORIDE 10 MG/1
10 TABLET ORAL DAILY
Qty: 30 TABLET | Refills: 2 | Status: SHIPPED | OUTPATIENT
Start: 2023-01-30

## 2023-01-30 NOTE — TELEPHONE ENCOUNTER
Rx Refill Note    Requested Prescriptions     Pending Prescriptions Disp Refills   • ezetimibe (ZETIA) 10 MG tablet 30 tablet 2     Sig: Take 1 tablet by mouth every night at bedtime.   • cetirizine (zyrTEC) 10 MG tablet 30 tablet 2     Sig: Take 1 tablet by mouth Daily.        Last office visit with prescribing clinician: 11/28/2022      Next office visit with prescribing clinician: 2/10/2023   Last labs:   Last refill: needs   Pharmacy (be sure to add in Epic). correct

## 2023-01-30 NOTE — TELEPHONE ENCOUNTER
Caller: Chris Tara    Relationship: Self    Best call back number:[phone]    Requested Prescriptions:   Requested Prescriptions     Pending Prescriptions Disp Refills   • ezetimibe (ZETIA) 10 MG tablet       Sig: Take 1 tablet by mouth every night at bedtime.   • cetirizine (zyrTEC) 10 MG tablet 30 tablet 0     Sig: Take 1 tablet by mouth Daily.        Pharmacy where request should be sent: [address] - [phone] [facility] [phone] FX     Additional details provided by patient:   PATIENT IS COMPLETELY OUT OF MEDICATION       Does the patient have less than a 3 day supply:  [x] Yes  [] No    Would you like a call back once the refill request has been completed: [] Yes [x] No    If the office needs to give you a call back, can they leave a voicemail: [] Yes [x] No    Radha Mitchell Rep   01/30/23 10:35 EST

## 2023-02-03 DIAGNOSIS — F33.41 MAJOR DEPRESSIVE DISORDER, RECURRENT EPISODE, IN PARTIAL REMISSION: ICD-10-CM

## 2023-02-03 RX ORDER — CARBAMAZEPINE 100 MG/1
100 TABLET, CHEWABLE ORAL 3 TIMES DAILY
Qty: 90 TABLET | Refills: 2 | Status: SHIPPED | OUTPATIENT
Start: 2023-02-03

## 2023-02-03 NOTE — TELEPHONE ENCOUNTER
Rx Refill Note    Requested Prescriptions     Pending Prescriptions Disp Refills   • carBAMazepine (TEGretol) 100 MG chewable tablet [Pharmacy Med Name: CARBAMAZEPINE 200 MG TABLET] 90 tablet 2     Sig: Chew 1 tablet 3 (Three) Times a Day.        Last office visit with prescribing clinician: 11/16/2022   Next office visit with prescribing clinician: 02/10/2023  Last labs:   Last refill:    Pharmacy  [facility]

## 2023-02-08 DIAGNOSIS — T14.8XXA MUSCLE STRAIN: ICD-10-CM

## 2023-02-08 RX ORDER — CYCLOBENZAPRINE HCL 10 MG
TABLET ORAL
Qty: 30 TABLET | Refills: 0 | Status: SHIPPED | OUTPATIENT
Start: 2023-02-08 | End: 2023-03-08

## 2023-02-08 NOTE — TELEPHONE ENCOUNTER
Rx Refill Note    Requested Prescriptions     Pending Prescriptions Disp Refills   • cyclobenzaprine (FLEXERIL) 10 MG tablet [Pharmacy Med Name: CYCLOBENZAPRINE 10 MG TABLET] 30 tablet 0     Sig: TAKE ONE TABLET BY MOUTH EVERY EVENING AT BEDTIME        Last office visit with prescribing clinician: 11/28/2022      Next office visit with prescribing clinician: 2/10/2023   Last labs:   Last refill: 01/11/2023   Pharmacy (be sure to add in Epic). correct

## 2023-02-10 ENCOUNTER — OFFICE VISIT (OUTPATIENT)
Dept: FAMILY MEDICINE CLINIC | Facility: CLINIC | Age: 63
End: 2023-02-10
Payer: MEDICAID

## 2023-02-10 VITALS
HEIGHT: 64 IN | SYSTOLIC BLOOD PRESSURE: 118 MMHG | OXYGEN SATURATION: 98 % | HEART RATE: 74 BPM | WEIGHT: 202.4 LBS | DIASTOLIC BLOOD PRESSURE: 74 MMHG | BODY MASS INDEX: 34.56 KG/M2

## 2023-02-10 DIAGNOSIS — E11.40 TYPE 2 DIABETES MELLITUS WITH DIABETIC NEUROPATHY, WITH LONG-TERM CURRENT USE OF INSULIN: ICD-10-CM

## 2023-02-10 DIAGNOSIS — Z79.4 TYPE 2 DIABETES MELLITUS WITH DIABETIC NEUROPATHY, WITH LONG-TERM CURRENT USE OF INSULIN: ICD-10-CM

## 2023-02-10 DIAGNOSIS — Z85.3 HISTORY OF BILATERAL BREAST CANCER: Chronic | ICD-10-CM

## 2023-02-10 DIAGNOSIS — T45.1X5A CHEMOTHERAPY-INDUCED NEUROPATHY: Primary | ICD-10-CM

## 2023-02-10 DIAGNOSIS — T45.1X5A CHEMOTHERAPY-INDUCED NEUROPATHY: ICD-10-CM

## 2023-02-10 DIAGNOSIS — G62.0 CHEMOTHERAPY-INDUCED NEUROPATHY: ICD-10-CM

## 2023-02-10 DIAGNOSIS — G62.0 CHEMOTHERAPY-INDUCED NEUROPATHY: Primary | ICD-10-CM

## 2023-02-10 PROCEDURE — 99214 OFFICE O/P EST MOD 30 MIN: CPT | Performed by: FAMILY MEDICINE

## 2023-02-10 RX ORDER — GABAPENTIN 400 MG/1
400 CAPSULE ORAL
Qty: 30 CAPSULE | Refills: 0 | OUTPATIENT
Start: 2023-02-10

## 2023-02-10 RX ORDER — EMPAGLIFLOZIN 25 MG/1
TABLET, FILM COATED ORAL
COMMUNITY
Start: 2022-12-13 | End: 2023-02-22 | Stop reason: SINTOL

## 2023-02-10 RX ORDER — GABAPENTIN 400 MG/1
400 CAPSULE ORAL
Qty: 30 CAPSULE | Refills: 0 | Status: SHIPPED | OUTPATIENT
Start: 2023-02-10 | End: 2023-03-13 | Stop reason: SDUPTHER

## 2023-02-10 NOTE — PROGRESS NOTES
Chief complaint/Reason for consult: Type 2 diabetes mellitus    HPI: Ms. Perez is a 62-year-old female with type 2 diabetes, history of breast cancer, hypertension, depression and mixed hyperlipidemia who comes for follow-up of type 2 diabetes.  She was last seen in this office on  11/16/2022 and reports since that time doing well without significant health changes.      # Guaw4NP, controlled with complications  # Diabetic retinopathy   - Diagnosed: around 2014   - Current regimen includes: 70/30 25 units with breakfast and 60 units with supper and Ozempic 2 mg weekly  - Tried metformin in the past but had intolerable GI issues   - Had a lot of yeast infections so SGLT2 inhibitors were stopped, not interested in trying it again  - Compliance with medications is good, rarely misses any doses   - Eats one main meal per day (dinner), a smaller breakfast and has some snacks during the day   - She continues to have regular soda at dinner with her medications   - Access to food is an issue due to her fixed income   - HbA1c: 5.8% today   - Using Dexcom G6     CGM Download  -Dates reviewed: 2/9/2023-2/22/2023  -Data: 93% wear time, average glucose 136 mg/dL, standard deviation 33 mg/dL, GMI 6.6%, 90% time in range, 9% high, less than 1% very high, less than 1% low, less than 1% very low  -Interpretation: Excellent glycemic control with occasional postprandial hyperglycemia after dinner and rare mild hypoglycemia in the late evening    - Hypoglycemia occurs worse around 11pm-MN but quickly increases to 200s around 2AM without correction of the low (has 1 low per week that is mild and happens when she skips a meal and gives her insulin)   - Hyperglycemia occurs with poor food choices during the day    - Patient reports 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), stable on gabapentin  - Patient denies gastroparesis   - Patient reports rotating injection sites reports no insulin is leaking   - Patient without known ASCVD  "  - taking ACEi; blood pressure today 130/70, reports 120s/70s at home       DM Health Maintenance:  Ophtho: done 10/2022 and reports nonproliferative retinopathy that is stable (see media tab)   Monofilament / Foot exam: abnormal, done 10/19/2022, has diabetic shoes which has helped a lot  Lipids/Statin: taking a statin and Zetia with last FLP showing LDL 72, total cholesterol 150, HDL 43 and triglycerides 213   RACHAEL:  Negative on 10/19/2022  TSH: 1.240 on 8/17/2022  Aspirin: not taking      # Mixed hyperlipidemia  - Compliant with atorvastatin 80 mg daily and ezetimibe 10 mg daily  - FLP showing LDL 72, total cholesterol 150, HDL 43 and triglycerides 213 on 8/22    Past medical history, past surgical history, family history and social history reviewed within this encounter.     Review of Systems   Constitutional: Negative for activity change and unexpected weight change.   HENT: Negative for trouble swallowing.    Eyes: Negative for visual disturbance.   Respiratory: Negative for shortness of breath.    Cardiovascular: Negative for chest pain.   Gastrointestinal: Negative for abdominal pain.   Endocrine: Negative for polyuria.   Genitourinary: Negative for dysuria.   Musculoskeletal: Negative for myalgias.   Skin: Negative for rash.   Neurological: Positive for numbness.   Psychiatric/Behavioral: Negative for agitation and confusion.        /70   Pulse 84   Ht 162.6 cm (64\")   Wt 91.2 kg (201 lb)   SpO2 98%   BMI 34.50 kg/m²      Physical Exam  Vitals reviewed.   Constitutional:       General: She is not in acute distress.     Appearance: She is obese.   HENT:      Head: Normocephalic.   Eyes:      Conjunctiva/sclera: Conjunctivae normal.   Cardiovascular:      Rate and Rhythm: Normal rate.   Pulmonary:      Effort: Pulmonary effort is normal.   Abdominal:      Tenderness: There is no guarding.   Musculoskeletal:         General: Normal range of motion.   Skin:     General: Skin is warm and dry. "   Neurological:      Mental Status: She is alert and oriented to person, place, and time.   Psychiatric:         Mood and Affect: Mood normal.         Behavior: Behavior normal.         Thought Content: Thought content normal.        Labs and images reviewed as noted in the HPI    Assessment and plan:    Diagnoses and all orders for this visit:    1. Type 2 diabetes mellitus with hyperglycemia, with long-term current use of insulin (HCC) (Primary)  Assessment & Plan:  -Overall patient with excellent control of her diabetes  -Counseled on the risk of hypoglycemia; recommend she cut her insulin dose in half if she skips a meal or does not drink regular soda with her dinner  -I counseled her multiple times on avoiding regular soda with supper to help with glycemic control and weight loss although at this time she enjoys it and does not plan to stop  -Recommend continuing Dexcom G6; can consider upgrading to Dexcom G7 at next visit  -Continue semaglutide 2 mg weekly  -Continue 70/30 25 units with breakfast and 60 units with supper, recommend giving half a dose if she is not eating or does not drink her soda with dinner  -Continue ACEi; blood pressure today 130/70, reports 120s/70s at home       DM Health Maintenance:  Ophtho: done 10/2022 and reports nonproliferative retinopathy that is stable (see media tab); repeat annually    Monofilament / Foot exam: abnormal, done 10/19/2022, has diabetic shoes which has helped a lot; continue nightly foot exams  Lipids/Statin: Continue statin and Zetia, last FLP with LDL 72, total cholesterol 150, HDL 43 and triglycerides 213   RACHAEL:  Negative on 10/19/2022; repeat annually  TSH: 1.240 on 8/17/2022  Aspirin: not taking     Orders:  -     POC Glycosylated Hemoglobin (Hb A1C)  -     Continuous Blood Gluc Sensor (Dexcom G6 Sensor); Every 10 (Ten) Days.  Dispense: 9 each; Refill: 3  -     Continuous Blood Gluc Transmit (Dexcom G6 Transmitter) misc; 1 each Every 3 (Three) Months.   Dispense: 1 each; Refill: 3    2. Mild nonproliferative diabetic retinopathy of both eyes without macular edema associated with type 2 diabetes mellitus (HCC)  Assessment & Plan:  -Continue good glycemic control, good blood pressure control and good lipid control to prevent progression  -Recommend regular follow-up with ophthalmology      3. Mixed hyperlipidemia  Assessment & Plan:  -Continue atorvastatin 80 mg daily and ezetimibe 10 mg daily  -Recommend repeat fasting lipid panel in about 6 months    Orders:  -     ezetimibe (ZETIA) 10 MG tablet; Take 1 tablet by mouth every night at bedtime.  Dispense: 90 tablet; Refill: 3  -     atorvastatin (LIPITOR) 80 MG tablet; Take 1 tablet by mouth Daily for 360 days.  Dispense: 90 tablet; Refill: 3       Return in about 3 months (around 5/22/2023) for T2DM .     I spent 35 minutes caring for Tara on this date of service. This time includes time spent by me in the following activities: preparing for the visit, reviewing tests, performing a medically appropriate examination and/or evaluation, counseling and educating the patient/family/caregiver, ordering medications, tests, or procedures and documenting information in the medical record I spent 5 minutes on the separately reported service of CGM download and interpretation. This time is not included in the time used to support the E/M service also reported today.    Electronically signed by: Kyle S Rosenstein, MD

## 2023-02-10 NOTE — PROGRESS NOTES
"Chief Complaint  Med Refill    Subjective          Tara Perez presents to [facility]  History of Present Illness  Patient is a 62-year-old female with history of previous breast cancer with chemotherapy-induced neuropathy who is here for gabapentin refill she is doing well overall no major concerns questions or issues noted specifically today      Objective   Vital Signs:   /74   Pulse 74   Ht 162.6 cm (64.02\")   Wt 91.8 kg (202 lb 6.4 oz)   SpO2 98%   BMI 34.72 kg/m²     Body mass index is 34.72 kg/m².    Review of Systems   Constitutional: Negative.    HENT: Negative.    Eyes: Negative.    Respiratory: Negative.    Cardiovascular: Negative.    Gastrointestinal: Negative.    Endocrine: Negative.    Genitourinary: Negative.    Musculoskeletal: Negative.    Skin: Negative.    Allergic/Immunologic: Negative.    Neurological: Negative.    Hematological: Negative.    Psychiatric/Behavioral: Negative.        Past History:  Medical History: has a past medical history of Type 2 diabetes mellitus (HCC).   Surgical History: has a past surgical history that includes Appendectomy; Tonsillectomy and adenoidectomy; and Mastectomy.   Family History: family history includes Diabetes in her brother, father, and sister.   Social History: reports that she quit smoking about 31 years ago. Her smoking use included cigarettes. She started smoking about 48 years ago. She has a 17.00 pack-year smoking history. She has never used smokeless tobacco. She reports that she does not drink alcohol and does not use drugs.      Current Outpatient Medications:   •  amLODIPine-benazepril (LOTREL) 5-40 MG per capsule, TAKE ONE CAPSULE BY MOUTH EVERY DAY, Disp: 30 capsule, Rfl: 0  •  atorvastatin (LIPITOR) 80 MG tablet, TAKE ONE TABLET BY MOUTH EVERY DAY, Disp: 30 tablet, Rfl: 5  •  carBAMazepine (TEGretol) 100 MG chewable tablet, Chew 1 tablet 3 (Three) Times a Day., Disp: 90 tablet, Rfl: 2  •  cetirizine " (zyrTEC) 10 MG tablet, Take 1 tablet by mouth Daily., Disp: 30 tablet, Rfl: 2  •  cloNIDine (CATAPRES) 0.1 MG tablet, TAKE ONE TABLET BY MOUTH 2 TIMES A DAY, Disp: 60 tablet, Rfl: 5  •  Continuous Blood Gluc  (Dexcom G6 ) device, 1 each Take As Directed., Disp: 1 each, Rfl: 0  •  Continuous Blood Gluc  (FreeStyle Alon 2 [place]) device, 1 each Daily., Disp: 1 each, Rfl: 0  •  Continuous Blood Gluc Sensor (Dexcom G6 Sensor), Every 10 (Ten) Days., Disp: 3 each, Rfl: 5  •  Continuous Blood Gluc Sensor (FreeStyle Alon 2 Sensor) misc, 1 each Every 14 (Fourteen) Days., Disp: 6 each, Rfl: 2  •  Continuous Blood Gluc Transmit (Dexcom G6 Transmitter) misc, 1 each Every 3 (Three) Months., Disp: 1 each, Rfl: 1  •  cyclobenzaprine (FLEXERIL) 10 MG tablet, TAKE ONE TABLET BY MOUTH EVERY EVENING AT BEDTIME, Disp: 30 tablet, Rfl: 0  •  Easy Touch Lancets 30G/Twist misc, USE AS DIRECTED TO TEST BLOOD SUGAR 3 TIMES DAILY, Disp: , Rfl:   •  ezetimibe (ZETIA) 10 MG tablet, Take 1 tablet by mouth every night at bedtime., Disp: 30 tablet, Rfl: 2  •  gabapentin (NEURONTIN) 400 MG capsule, Take 1 capsule by mouth every night at bedtime., Disp: 30 capsule, Rfl: 0  •  insulin aspart prot & aspart (NovoLOG 70/30 FlexPen ReliOn) (70-30) 100 UNIT/ML suspension pen-injector injection, Inject 25u under the skin before breakfast and 60u under the skin before dinner, titrate dose as directed for max daily dose 100u, Disp: 30 mL, Rfl: 5  •  Insulin Pen Needle (B-D ULTRAFINE III SHORT PEN) 31G X 8 MM misc, Use as directed twice daily, Disp: 90 each, Rfl: 11  •  Jardiance 25 MG tablet tablet, , Disp: , Rfl:   •  OneTouch Ultra test strip, Use as instructed to test twice daily, Disp: 100 each, Rfl: 11  •  PARoxetine (PAXIL) 40 MG tablet, TAKE ONE TABLET BY MOUTH EVERY DAY, Disp: 90 tablet, Rfl: 0  •  Semaglutide, 2 MG/DOSE, (Ozempic, 2 MG/DOSE,) 8 MG/3ML solution pen-injector, Inject 2 mg under the skin into the appropriate  area as directed 1 (One) Time Per Week., Disp: 3 mL, Rfl: 3  •  vitamin D (ERGOCALCIFEROL) 1.25 MG (32237 UT) capsule capsule, Take 50,000 Units by mouth 2 (Two) Times a Week., Disp: , Rfl:     Allergies: Penicillins    Physical Exam  Constitutional:       Appearance: Normal appearance. She is normal weight.   HENT:      Head: Normocephalic and atraumatic.   Eyes:      Conjunctiva/sclera: Conjunctivae normal.   Cardiovascular:      Rate and Rhythm: Normal rate and regular rhythm.   Pulmonary:      Effort: Pulmonary effort is normal.      Breath sounds: Normal breath sounds.   Musculoskeletal:         General: Normal range of motion.      Cervical back: Normal range of motion and neck supple.   Skin:     General: Skin is warm and dry.   Neurological:      General: No focal deficit present.      Mental Status: She is alert and oriented to person, place, and time. Mental status is at baseline.   Psychiatric:         Mood and Affect: Mood normal.         Behavior: Behavior normal.         Thought Content: Thought content normal.         Judgment: Judgment normal.          Result Review :                   Assessment and Plan    Diagnoses and all orders for this visit:    1. Chemotherapy-induced neuropathy (HCC) (Primary)  -     gabapentin (NEURONTIN) 400 MG capsule; Take 1 capsule by mouth every night at bedtime.  Dispense: 30 capsule; Refill: 0  She is up-to-date on her [employer] contract are all appropriate continue the gabapentin    2. Type 2 diabetes mellitus with diabetic neuropathy, with long-term current use of insulin (HCC)  -     gabapentin (NEURONTIN) 400 MG capsule; Take 1 capsule by mouth every night at bedtime.  Dispense: 30 capsule; Refill: 0  She does follow with endocrinology appropriately for this    3. History of bilateral breast cancer  Chronic and resolved  Follows appropriately with oncology    MEDICATION SIDE EFFECTS, RISKS AND SIDE EFFECTS HAVE BEEN DISCUSSED WITH PATIENT. PATIENT NOTES  UNDERSTANDING. IF ANY CONCERN OR QUESTION REGARDING MEDICATION PLEASE CONTACT.         Follow Up   No follow-ups on file.  Patient was given instructions and counseling regarding her condition or for health maintenance advice. Please see specific information pulled into the AVS if appropriate.     Naomi Sandy DO

## 2023-02-15 DIAGNOSIS — I10 PRIMARY HYPERTENSION: ICD-10-CM

## 2023-02-15 NOTE — TELEPHONE ENCOUNTER
Rx Refill Note    Requested Prescriptions     Pending Prescriptions Disp Refills   • amLODIPine-benazepril (LOTREL) 5-40 MG per capsule [Pharmacy Med Name: AMLODIPINE-BENAZ 5-40 MG] 30 capsule 0     Sig: TAKE ONE CAPSULE BY MOUTH EVERY DAY        Last office visit with prescribing clinician: 2/10/2023      Next office visit with prescribing clinician: 5/23/2023   Last labs:   Last refill: 01/18/2023   Pharmacy (be sure to add in Epic). correct

## 2023-02-16 DIAGNOSIS — I10 PRIMARY HYPERTENSION: ICD-10-CM

## 2023-02-16 RX ORDER — AMLODIPINE BESYLATE AND BENAZEPRIL HYDROCHLORIDE 5; 40 MG/1; MG/1
CAPSULE ORAL
Qty: 30 CAPSULE | Refills: 0 | OUTPATIENT
Start: 2023-02-16

## 2023-02-16 RX ORDER — AMLODIPINE BESYLATE AND BENAZEPRIL HYDROCHLORIDE 5; 40 MG/1; MG/1
CAPSULE ORAL
Qty: 30 CAPSULE | Refills: 0 | Status: SHIPPED | OUTPATIENT
Start: 2023-02-16 | End: 2023-03-16

## 2023-02-22 ENCOUNTER — OFFICE VISIT (OUTPATIENT)
Dept: ENDOCRINOLOGY | Facility: CLINIC | Age: 63
End: 2023-02-22
Payer: MEDICAID

## 2023-02-22 VITALS
SYSTOLIC BLOOD PRESSURE: 130 MMHG | HEART RATE: 84 BPM | BODY MASS INDEX: 34.31 KG/M2 | HEIGHT: 64 IN | WEIGHT: 201 LBS | DIASTOLIC BLOOD PRESSURE: 70 MMHG | OXYGEN SATURATION: 98 %

## 2023-02-22 DIAGNOSIS — E78.2 MIXED HYPERLIPIDEMIA: ICD-10-CM

## 2023-02-22 DIAGNOSIS — E11.65 TYPE 2 DIABETES MELLITUS WITH HYPERGLYCEMIA, WITH LONG-TERM CURRENT USE OF INSULIN: Primary | ICD-10-CM

## 2023-02-22 DIAGNOSIS — Z79.4 TYPE 2 DIABETES MELLITUS WITH HYPERGLYCEMIA, WITH LONG-TERM CURRENT USE OF INSULIN: Primary | ICD-10-CM

## 2023-02-22 DIAGNOSIS — E11.3293 MILD NONPROLIFERATIVE DIABETIC RETINOPATHY OF BOTH EYES WITHOUT MACULAR EDEMA ASSOCIATED WITH TYPE 2 DIABETES MELLITUS: ICD-10-CM

## 2023-02-22 LAB
EXPIRATION DATE: NORMAL
HBA1C MFR BLD: 5.8 %
Lab: NORMAL

## 2023-02-22 PROCEDURE — 83036 HEMOGLOBIN GLYCOSYLATED A1C: CPT | Performed by: HOSPITALIST

## 2023-02-22 PROCEDURE — 99214 OFFICE O/P EST MOD 30 MIN: CPT | Performed by: HOSPITALIST

## 2023-02-22 PROCEDURE — 95251 CONT GLUC MNTR ANALYSIS I&R: CPT | Performed by: HOSPITALIST

## 2023-02-22 PROCEDURE — 3044F HG A1C LEVEL LT 7.0%: CPT | Performed by: HOSPITALIST

## 2023-02-22 RX ORDER — EZETIMIBE 10 MG/1
10 TABLET ORAL
Qty: 90 TABLET | Refills: 3 | Status: SHIPPED | OUTPATIENT
Start: 2023-02-22

## 2023-02-22 RX ORDER — PROCHLORPERAZINE 25 MG/1
1 SUPPOSITORY RECTAL
Qty: 1 EACH | Refills: 3 | Status: SHIPPED | OUTPATIENT
Start: 2023-02-22

## 2023-02-22 RX ORDER — PROCHLORPERAZINE 25 MG/1
SUPPOSITORY RECTAL
Qty: 9 EACH | Refills: 3 | Status: SHIPPED | OUTPATIENT
Start: 2023-02-22

## 2023-02-22 RX ORDER — ATORVASTATIN CALCIUM 80 MG/1
80 TABLET, FILM COATED ORAL DAILY
Qty: 90 TABLET | Refills: 3 | Status: SHIPPED | OUTPATIENT
Start: 2023-02-22 | End: 2024-02-17

## 2023-02-22 NOTE — ASSESSMENT & PLAN NOTE
-Continue atorvastatin 80 mg daily and ezetimibe 10 mg daily  -Recommend repeat fasting lipid panel in about 6 months

## 2023-02-22 NOTE — ASSESSMENT & PLAN NOTE
-Overall patient with excellent control of her diabetes  -Counseled on the risk of hypoglycemia; recommend she cut her insulin dose in half if she skips a meal or does not drink regular soda with her dinner  -I counseled her multiple times on avoiding regular soda with supper to help with glycemic control and weight loss although at this time she enjoys it and does not plan to stop  -Recommend continuing Dexcom G6; can consider upgrading to Dexcom G7 at next visit  -Continue semaglutide 2 mg weekly  -Continue 70/30 25 units with breakfast and 60 units with supper, recommend giving half a dose if she is not eating or does not drink her soda with dinner  -Continue ACEi; blood pressure today 130/70, reports 120s/70s at home       DM Health Maintenance:  Ophtho: done 10/2022 and reports nonproliferative retinopathy that is stable (see media tab); repeat annually    Monofilament / Foot exam: abnormal, done 10/19/2022, has diabetic shoes which has helped a lot; continue nightly foot exams  Lipids/Statin: Continue statin and Zetia, last FLP with LDL 72, total cholesterol 150, HDL 43 and triglycerides 213   RACHAEL:  Negative on 10/19/2022; repeat annually  TSH: 1.240 on 8/17/2022  Aspirin: not taking

## 2023-02-22 NOTE — ASSESSMENT & PLAN NOTE
-Continue good glycemic control, good blood pressure control and good lipid control to prevent progression  -Recommend regular follow-up with ophthalmology

## 2023-03-08 DIAGNOSIS — T14.8XXA MUSCLE STRAIN: ICD-10-CM

## 2023-03-08 RX ORDER — CYCLOBENZAPRINE HCL 10 MG
TABLET ORAL
Qty: 30 TABLET | Refills: 0 | Status: SHIPPED | OUTPATIENT
Start: 2023-03-08 | End: 2023-04-05

## 2023-03-08 NOTE — TELEPHONE ENCOUNTER
Rx Refill Note    Requested Prescriptions     Pending Prescriptions Disp Refills   • cyclobenzaprine (FLEXERIL) 10 MG tablet [Pharmacy Med Name: CYCLOBENZAPRINE 10 MG TABLET] 30 tablet 0     Sig: TAKE ONE TABLET BY MOUTH EVERY EVENING AT BEDTIME        Last office visit with prescribing clinician: 2/10/2023      Next office visit with prescribing clinician: 5/23/2023   Last labs:   Last refill: 02/08/2023   Pharmacy (be sure to add in Epic). correct

## 2023-03-13 DIAGNOSIS — Z79.4 TYPE 2 DIABETES MELLITUS WITH DIABETIC NEUROPATHY, WITH LONG-TERM CURRENT USE OF INSULIN: ICD-10-CM

## 2023-03-13 DIAGNOSIS — G62.0 CHEMOTHERAPY-INDUCED NEUROPATHY: ICD-10-CM

## 2023-03-13 DIAGNOSIS — T45.1X5A CHEMOTHERAPY-INDUCED NEUROPATHY: ICD-10-CM

## 2023-03-13 DIAGNOSIS — E11.40 TYPE 2 DIABETES MELLITUS WITH DIABETIC NEUROPATHY, WITH LONG-TERM CURRENT USE OF INSULIN: ICD-10-CM

## 2023-03-13 NOTE — TELEPHONE ENCOUNTER
Caller: Tara Perez    Relationship: Self    Best call back number:  [phone]    Requested Prescriptions:   Requested Prescriptions     Pending Prescriptions Disp Refills   • gabapentin (NEURONTIN) 400 MG capsule 30 capsule 0     Sig: Take 1 capsule by mouth every night at bedtime.      Pharmacy where request should be sent: [address] - [phone]  - [phone] FX     Additional details provided by patient:      Does the patient have less than a 3 day supply:  [x] Yes  [] No    Would you like a call back once the refill request has been completed: [] Yes [] No    If the office needs to give you a call back, can they leave a voicemail: [] Yes [x] No

## 2023-03-13 NOTE — TELEPHONE ENCOUNTER
Rx Refill Note    Requested Prescriptions     Pending Prescriptions Disp Refills   • gabapentin (NEURONTIN) 400 MG capsule 30 capsule 0     Sig: Take 1 capsule by mouth every night at bedtime.        Last office visit with prescribing clinician: 2/10/2023      Next office visit with prescribing clinician: 5/23/2023   Last labs:   Last refill: 02/10/2023   Pharmacy (be sure to add in Epic). correct

## 2023-03-14 RX ORDER — GABAPENTIN 400 MG/1
400 CAPSULE ORAL
Qty: 30 CAPSULE | Refills: 0 | Status: SHIPPED | OUTPATIENT
Start: 2023-03-14

## 2023-03-16 DIAGNOSIS — I10 PRIMARY HYPERTENSION: ICD-10-CM

## 2023-03-16 RX ORDER — AMLODIPINE BESYLATE AND BENAZEPRIL HYDROCHLORIDE 5; 40 MG/1; MG/1
CAPSULE ORAL
Qty: 30 CAPSULE | Refills: 0 | Status: SHIPPED | OUTPATIENT
Start: 2023-03-16

## 2023-03-16 NOTE — TELEPHONE ENCOUNTER
Rx Refill Note    Requested Prescriptions     Pending Prescriptions Disp Refills   • amLODIPine-benazepril (LOTREL) 5-40 MG per capsule [Pharmacy Med Name: AMLODIPINE-BENAZ 5-40 MG] 30 capsule 0     Sig: TAKE ONE CAPSULE BY MOUTH EVERY DAY        Last office visit with prescribing clinician: 2/10/2023      Next office visit with prescribing clinician: 5/23/2023   Last labs:   Last refill: 02/16/2023   Pharmacy (be sure to add in Epic). correct

## 2023-04-05 DIAGNOSIS — T14.8XXA MUSCLE STRAIN: ICD-10-CM

## 2023-04-05 RX ORDER — CYCLOBENZAPRINE HCL 10 MG
TABLET ORAL
Qty: 30 TABLET | Refills: 0 | Status: SHIPPED | OUTPATIENT
Start: 2023-04-05

## 2023-04-05 NOTE — TELEPHONE ENCOUNTER
Rx Refill Note    Requested Prescriptions     Pending Prescriptions Disp Refills   • cyclobenzaprine (FLEXERIL) 10 MG tablet [Pharmacy Med Name: CYCLOBENZAPRINE 10 MG TABLET] 30 tablet 0     Sig: TAKE ONE TABLET BY MOUTH EVERY EVENING AT BEDTIME        Last office visit with prescribing clinician: 2/10/2023      Next office visit with prescribing clinician: 5/23/2023   Last labs:   Last refill: 03/08/2023   Pharmacy (be sure to add in Epic). Correct  Dr. Sandy patient

## 2023-04-11 DIAGNOSIS — G62.0 CHEMOTHERAPY-INDUCED NEUROPATHY: ICD-10-CM

## 2023-04-11 DIAGNOSIS — E11.40 TYPE 2 DIABETES MELLITUS WITH DIABETIC NEUROPATHY, WITH LONG-TERM CURRENT USE OF INSULIN: ICD-10-CM

## 2023-04-11 DIAGNOSIS — T45.1X5A CHEMOTHERAPY-INDUCED NEUROPATHY: ICD-10-CM

## 2023-04-11 DIAGNOSIS — Z79.4 TYPE 2 DIABETES MELLITUS WITH DIABETIC NEUROPATHY, WITH LONG-TERM CURRENT USE OF INSULIN: ICD-10-CM

## 2023-04-11 NOTE — TELEPHONE ENCOUNTER
Caller: Tara Perez    Relationship: Self    Best call back number: [phone]  Requested Prescriptions:   Requested Prescriptions     Pending Prescriptions Disp Refills   • gabapentin (NEURONTIN) 400 MG capsule 30 capsule 0     Sig: Take 1 capsule by mouth every night at bedtime.        Pharmacy where request should be sent: [address] - [phone]  - [phone] FX     Last office visit with prescribing clinician: 2/10/2023   Last telemedicine visit with prescribing clinician: 5/16/2023   Next office visit with prescribing clinician: 5/23/2023     Additional details provided by patient: PLEASE REFILL   Does the patient have less than a 3 day supply:  [] Yes  [x] No    Would you like a call back once the refill request has been completed: [] Yes [x] No    If the office needs to give you a call back, can they leave a voicemail: [] Yes [x] No    Radha Celeste Rep   04/11/23 12:53 EDT

## 2023-04-11 NOTE — TELEPHONE ENCOUNTER
Rx Refill Note    Requested Prescriptions     Pending Prescriptions Disp Refills   • gabapentin (NEURONTIN) 400 MG capsule 30 capsule 0     Sig: Take 1 capsule by mouth every night at bedtime.        Last office visit with prescribing clinician: 2/10/2023      Next office visit with prescribing clinician: 5/23/2023   Last labs:   Last refill: 03/14/2023   Pharmacy (be sure to add in Epic). correct

## 2023-04-12 RX ORDER — GABAPENTIN 400 MG/1
400 CAPSULE ORAL
Qty: 30 CAPSULE | Refills: 0 | Status: SHIPPED | OUTPATIENT
Start: 2023-04-12

## 2023-04-13 DIAGNOSIS — I10 PRIMARY HYPERTENSION: ICD-10-CM

## 2023-04-13 RX ORDER — AMLODIPINE BESYLATE AND BENAZEPRIL HYDROCHLORIDE 5; 40 MG/1; MG/1
CAPSULE ORAL
Qty: 90 CAPSULE | Refills: 0 | Status: SHIPPED | OUTPATIENT
Start: 2023-04-13

## 2023-04-14 DIAGNOSIS — F33.41 MAJOR DEPRESSIVE DISORDER, RECURRENT EPISODE, IN PARTIAL REMISSION: ICD-10-CM

## 2023-04-17 RX ORDER — PAROXETINE HYDROCHLORIDE 40 MG/1
TABLET, FILM COATED ORAL
Qty: 90 TABLET | Refills: 0 | Status: SHIPPED | OUTPATIENT
Start: 2023-04-17

## 2023-04-26 DIAGNOSIS — J30.9 ALLERGIC RHINITIS, UNSPECIFIED SEASONALITY, UNSPECIFIED TRIGGER: ICD-10-CM

## 2023-04-26 RX ORDER — CETIRIZINE HYDROCHLORIDE 10 MG/1
TABLET ORAL
Qty: 30 TABLET | Refills: 4 | Status: SHIPPED | OUTPATIENT
Start: 2023-04-26

## 2023-04-26 NOTE — TELEPHONE ENCOUNTER
Rx Refill Note    Requested Prescriptions     Pending Prescriptions Disp Refills   • cetirizine (zyrTEC) 10 MG tablet [Pharmacy Med Name: CETIRIZINE HCL 10 MG TABLET] 30 tablet 0     Sig: TAKE ONE TABLET BY MOUTH EVERY DAY        Last office visit with prescribing clinician: 2/10/2023      Next office visit with prescribing clinician: 5/23/2023   Last labs:   Last refill: 01/30/2023   Pharmacy (be sure to add in Epic). correct

## 2023-04-28 DIAGNOSIS — F33.41 MAJOR DEPRESSIVE DISORDER, RECURRENT EPISODE, IN PARTIAL REMISSION: ICD-10-CM

## 2023-04-28 RX ORDER — CARBAMAZEPINE 100 MG/1
TABLET, CHEWABLE ORAL
Qty: 90 TABLET | Refills: 0 | Status: SHIPPED | OUTPATIENT
Start: 2023-04-28

## 2023-04-28 NOTE — TELEPHONE ENCOUNTER
Rx Refill Note    Requested Prescriptions     Pending Prescriptions Disp Refills   • carBAMazepine (TEGretol) 100 MG chewable tablet [Pharmacy Med Name: CARBAMAZEPINE 100 MG TAB CHEW] 90 tablet 0     Sig: CHEW ONE TABLET BY MOUTH 3 TIMES A DAY.        Last office visit with prescribing clinician: 2/10/2023      Next office visit with prescribing clinician: 5/23/2023   Last labs:   Last refill: 02/03/2023   Pharmacy (be sure to add in Epic). correct

## 2023-05-15 DIAGNOSIS — Z79.4 TYPE 2 DIABETES MELLITUS WITH DIABETIC NEUROPATHY, WITH LONG-TERM CURRENT USE OF INSULIN: ICD-10-CM

## 2023-05-15 DIAGNOSIS — T45.1X5A CHEMOTHERAPY-INDUCED NEUROPATHY: ICD-10-CM

## 2023-05-15 DIAGNOSIS — G62.0 CHEMOTHERAPY-INDUCED NEUROPATHY: ICD-10-CM

## 2023-05-15 DIAGNOSIS — E11.40 TYPE 2 DIABETES MELLITUS WITH DIABETIC NEUROPATHY, WITH LONG-TERM CURRENT USE OF INSULIN: ICD-10-CM

## 2023-05-15 NOTE — TELEPHONE ENCOUNTER
Rx Refill Note    Requested Prescriptions     Pending Prescriptions Disp Refills   • gabapentin (NEURONTIN) 400 MG capsule 30 capsule 0     Sig: Take 1 capsule by mouth every night at bedtime.        Last office visit with prescribing clinician: 2/10/2023      Next office visit with prescribing clinician: 5/23/2023   Last labs:   Last refill: 04/12/2023   Pharmacy (be sure to add in Epic). correct

## 2023-05-15 NOTE — TELEPHONE ENCOUNTER
Caller: Tara Perez    Relationship: Self    Best call back number: [phone]    Requested Prescriptions:   Requested Prescriptions     Pending Prescriptions Disp Refills   • gabapentin (NEURONTIN) 400 MG capsule 30 capsule 0     Sig: Take 1 capsule by mouth every night at bedtime.        Pharmacy where request should be sent: [address] - [phone]  - [phone]     Last office visit with prescribing clinician: 2/10/2023   Last telemedicine visit with prescribing clinician: 4/28/2023   Next office visit with prescribing clinician: 5/23/2023     Additional details provided by patient: THREE PILLS LEFT    Does the patient have less than a 3 day supply:  [] Yes  [x] No    Would you like a call back once the refill request has been completed: [] Yes [x] No    If the office needs to give you a call back, can they leave a voicemail: [] Yes [x] No    Steffi Liang   05/15/23 10:54 EDT

## 2023-05-16 ENCOUNTER — LAB (OUTPATIENT)
Dept: FAMILY MEDICINE CLINIC | Facility: CLINIC | Age: 63
End: 2023-05-16
Payer: MEDICAID

## 2023-05-16 DIAGNOSIS — Z79.899 ENCOUNTER FOR LONG-TERM (CURRENT) USE OF OTHER MEDICATIONS: Primary | ICD-10-CM

## 2023-05-16 PROCEDURE — 36415 COLL VENOUS BLD VENIPUNCTURE: CPT | Performed by: FAMILY MEDICINE

## 2023-05-16 RX ORDER — GABAPENTIN 400 MG/1
400 CAPSULE ORAL
Qty: 30 CAPSULE | Refills: 0 | Status: SHIPPED | OUTPATIENT
Start: 2023-05-16

## 2023-05-17 LAB
ALBUMIN SERPL-MCNC: 4.2 G/DL (ref 3.8–4.8)
ALBUMIN/GLOB SERPL: 1.6 {RATIO} (ref 1.2–2.2)
ALP SERPL-CCNC: 68 IU/L (ref 44–121)
ALT SERPL-CCNC: 9 IU/L (ref 0–32)
AST SERPL-CCNC: 17 IU/L (ref 0–40)
BASOPHILS # BLD AUTO: 0.1 X10E3/UL (ref 0–0.2)
BASOPHILS NFR BLD AUTO: 1 %
BILIRUB SERPL-MCNC: 0.3 MG/DL (ref 0–1.2)
BUN SERPL-MCNC: 12 MG/DL (ref 8–27)
BUN/CREAT SERPL: 13 (ref 12–28)
CALCIUM SERPL-MCNC: 8.9 MG/DL (ref 8.7–10.3)
CHLORIDE SERPL-SCNC: 106 MMOL/L (ref 96–106)
CHOLEST SERPL-MCNC: 162 MG/DL (ref 100–199)
CO2 SERPL-SCNC: 22 MMOL/L (ref 20–29)
CREAT SERPL-MCNC: 0.94 MG/DL (ref 0.57–1)
EGFRCR SERPLBLD CKD-EPI 2021: 69 ML/MIN/1.73
EOSINOPHIL # BLD AUTO: 0.3 X10E3/UL (ref 0–0.4)
EOSINOPHIL NFR BLD AUTO: 4 %
ERYTHROCYTE [DISTWIDTH] IN BLOOD BY AUTOMATED COUNT: 13 % (ref 11.7–15.4)
GLOBULIN SER CALC-MCNC: 2.7 G/DL (ref 1.5–4.5)
GLUCOSE SERPL-MCNC: 81 MG/DL (ref 70–99)
HBA1C MFR BLD: 5.6 % (ref 4.8–5.6)
HCT VFR BLD AUTO: 40.7 % (ref 34–46.6)
HDLC SERPL-MCNC: 46 MG/DL
HGB BLD-MCNC: 14.2 G/DL (ref 11.1–15.9)
IMM GRANULOCYTES # BLD AUTO: 0 X10E3/UL (ref 0–0.1)
IMM GRANULOCYTES NFR BLD AUTO: 0 %
LDLC SERPL CALC-MCNC: 93 MG/DL (ref 0–99)
LYMPHOCYTES # BLD AUTO: 2.8 X10E3/UL (ref 0.7–3.1)
LYMPHOCYTES NFR BLD AUTO: 42 %
MCH RBC QN AUTO: 31.6 PG (ref 26.6–33)
MCHC RBC AUTO-ENTMCNC: 34.9 G/DL (ref 31.5–35.7)
MCV RBC AUTO: 90 FL (ref 79–97)
MONOCYTES # BLD AUTO: 0.6 X10E3/UL (ref 0.1–0.9)
MONOCYTES NFR BLD AUTO: 8 %
NEUTROPHILS # BLD AUTO: 3 X10E3/UL (ref 1.4–7)
NEUTROPHILS NFR BLD AUTO: 45 %
PLATELET # BLD AUTO: 239 X10E3/UL (ref 150–450)
POTASSIUM SERPL-SCNC: 4 MMOL/L (ref 3.5–5.2)
PROT SERPL-MCNC: 6.9 G/DL (ref 6–8.5)
RBC # BLD AUTO: 4.5 X10E6/UL (ref 3.77–5.28)
SODIUM SERPL-SCNC: 141 MMOL/L (ref 134–144)
T4 SERPL-MCNC: 5.6 UG/DL (ref 4.5–12)
TRIGL SERPL-MCNC: 127 MG/DL (ref 0–149)
TSH SERPL DL<=0.005 MIU/L-ACNC: 0.79 UIU/ML (ref 0.45–4.5)
VLDLC SERPL CALC-MCNC: 23 MG/DL (ref 5–40)
WBC # BLD AUTO: 6.6 X10E3/UL (ref 3.4–10.8)

## 2023-05-19 PROBLEM — E11.3293 MILD NONPROLIFERATIVE DIABETIC RETINOPATHY OF BOTH EYES WITHOUT MACULAR EDEMA ASSOCIATED WITH TYPE 2 DIABETES MELLITUS: Status: RESOLVED | Noted: 2022-10-19 | Resolved: 2023-05-19

## 2023-05-19 PROBLEM — E11.3293 TYPE 2 DIABETES MELLITUS WITH BOTH EYES AFFECTED BY MILD NONPROLIFERATIVE RETINOPATHY WITHOUT MACULAR EDEMA, WITH LONG-TERM CURRENT USE OF INSULIN: Status: ACTIVE | Noted: 2022-05-17

## 2023-05-19 NOTE — PROGRESS NOTES
Chief complaint/Reason for consult: T2DM    HPI: Ms. Perez is a 62-year-old female with type 2 diabetes, history of breast cancer, hypertension, depression and mixed hyperlipidemia who comes for follow-up of type 2 diabetes.  She was last seen in this office on  2/22/2023 and reports since that time no significant changes in her health.     # [id number], controlled with complications  # Diabetic retinopathy   - Diagnosed: around 2014   - Current regimen includes: 70/30 25 units with breakfast and 60 units with supper and Ozempic 2 mg weekly  - Tried metformin in the past but had intolerable GI issues   - Had a lot of yeast infections so SGLT2 inhibitors were stopped, not interested in trying it again  - Compliance with medications is good, rarely misses any doses although she is frequently taking her insulin injections after eating her meal  - Eats one main meal per day (dinner), a smaller breakfast and has some snacks during the day   - She continues to have regular soda at dinner with her medications  - Access to food is an issue due to her fixed income   - HbA1c: 5.6%  5/16/2023  - Using Dexcom G6      CGM Download  -Dates reviewed:  5/9/2023-5/22/2023  -Data:  93% wear time, average glucose 128 mg/dL, standard deviation 36 mg/dL, GMI 6.4%, less than 1% very high, 6% high, 91% time in range, 1% low and 1% very low  -Interpretation: Excellent glycemic control with minimal daytime variability, postprandial hyperglycemia after suppertime meal and occasional hypoglycemia around midnight     - Hypoglycemia occurs worse around midnight   - Hyperglycemia occurs just after supper time, worse depending on what she eats  - Patient reports 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), stable on gabapentin  - Patient denies gastroparesis   - Patient reports rotating injection sites   - Patient without known ASCVD   - taking ACEi; blood pressure today 120/74        DM Health Maintenance:  Ophtho: done 10/2022 and reports  "nonproliferative retinopathy that is stable (see media tab)   Monofilament / Foot exam: abnormal, completed today, has diabetic shoes which has helped a lot  Lipids/Statin: taking a statin and Zetia with last FLP showing LDL 93 on 5/16/23  RACHAEL:  Negative on 10/19/2022  TSH: 0.780 with total T4 5.6 On 5/16/2023   Aspirin: not taking      # Mixed hyperlipidemia  - Compliant with atorvastatin 80 mg daily and ezetimibe 10 mg daily, no reported side effects on these medications  - FLP showing total cholesterol 162, HDL 46, LDL 93 and triglycerides 127 done 5/16/2023    Past medical history, past surgical history, family history and social history reviewed within this encounter.     Review of Systems   Constitutional: Negative for activity change, fatigue and unexpected weight change.   HENT: Negative for trouble swallowing.    Eyes: Negative for visual disturbance.   Respiratory: Negative for shortness of breath.    Cardiovascular: Negative for chest pain.   Gastrointestinal: Negative for abdominal pain.   Endocrine: Negative for cold intolerance and heat intolerance.   Musculoskeletal: Positive for arthralgias.   Skin: Positive for wound. Negative for rash.   Neurological: Positive for numbness.   Psychiatric/Behavioral: Negative for agitation and confusion.        /74   Pulse 71   Ht 162.6 cm (64\")   Wt 91.2 kg (201 lb)   SpO2 97%   BMI 34.50 kg/m²      Physical Exam  Vitals reviewed.   Constitutional:       General: She is not in acute distress.     Appearance: She is obese.   HENT:      Head: Normocephalic.      Nose: Nose normal.   Eyes:      Conjunctiva/sclera: Conjunctivae normal.   Cardiovascular:      Rate and Rhythm: Normal rate.      Pulses: Normal pulses.           Dorsalis pedis pulses are 2+ on the right side and 2+ on the left side.   Pulmonary:      Effort: Pulmonary effort is normal.   Abdominal:      Tenderness: There is no guarding.   Musculoskeletal:         General: Normal range of motion. "      Right foot: No deformity or bunion.      Left foot: No deformity or bunion.        Feet:    Feet:      Right foot:      Protective Sensation: 8 sites tested. 2 sites sensed.      Skin integrity: Skin breakdown present.      Left foot:      Protective Sensation: 0 sites tested. 8 sites sensed.      Skin integrity: Skin integrity normal.      Toenail Condition: Left toenails are normal.      Comments: Overtrimmed toenails on the right foot as noted above with skin abrasion  Skin:     General: Skin is warm and dry.   Neurological:      Mental Status: She is alert and oriented to person, place, and time. Mental status is at baseline.   Psychiatric:         Mood and Affect: Mood normal.         Behavior: Behavior normal.         Thought Content: Thought content normal.        Labs and images reviewed as noted in the HPI    Assessment and plan:    Diagnoses and all orders for this visit:    1. Type 2 diabetes mellitus with both eyes affected by mild nonproliferative retinopathy without macular edema, with long-term current use of insulin (Primary)  Assessment & Plan:  -Patient overall has good control of her diabetes at this time  -Suspect her postprandial hyperglycemia after supper time is due to taking her insulin shot too late and then delayed hypoglycemia is due to too high of an insulin dose  -Recommend continuing Ozempic 2 mg weekly  -Recommend continuing 70/30 25 units with breakfast  -Recommend reducing 70/30 to 55 units with supper and taking it 15 to 20 minutes prior to eating  -Continue Dexcom G6  -Continue benazepril; blood pressure today 120/74        DM Health Maintenance:  Ophtho: done 10/2022 and reports nonproliferative retinopathy that is stable (see media tab); recommend annual follow-up  Monofilament / Foot exam: abnormal as noted above, completed today, recommend continuing with diabetic shoes  Lipids/Statin: Continue statin and Zetia with last FLP showing LDL 93 on 5/16/23  RACHAEL:  Negative on  10/19/2022  TSH: 0.780 with total T4 5.6 On 5/16/2023   Aspirin: not taking     Orders:  -     insulin aspart prot & aspart (NovoLOG 70/30 FlexPen ReliOn) (70-30) 100 UNIT/ML suspension pen-injector injection; Inject 25u under the skin before breakfast and 55u under the skin before dinner, titrate dose as directed for max daily dose 100u  Dispense: 30 mL; Refill: 5  -     Continuous Blood Gluc Sensor (Dexcom G6 Sensor); Every 10 (Ten) Days.  Dispense: 9 each; Refill: 3  -     Continuous Blood Gluc Transmit (Dexcom G6 Transmitter) misc; 1 each Every 3 (Three) Months.  Dispense: 1 each; Refill: 3    2. Mixed hyperlipidemia  Assessment & Plan:  -Lipids are uncontrolled due to LDL of 93 with goal LDL less than 70  -Recommend continuing atorvastatin 80 mg daily and ezetimibe 10 mg daily  -Continue good glycemic control  -Recommend improved diet with reduced saturated fats and simple sugars  -Repeat fasting lipid panel in 6 months    Orders:  -     ezetimibe (ZETIA) 10 MG tablet; Take 1 tablet by mouth every night at bedtime.  Dispense: 90 tablet; Refill: 3  -     atorvastatin (LIPITOR) 80 MG tablet; Take 1 tablet by mouth Daily for 360 days.  Dispense: 90 tablet; Refill: 3       Return in about 3 months (around 8/24/2023) for T2DM.     Electronically signed by: Kyle S Rosenstein, MD

## 2023-05-23 ENCOUNTER — OFFICE VISIT (OUTPATIENT)
Dept: FAMILY MEDICINE CLINIC | Facility: CLINIC | Age: 63
End: 2023-05-23
Payer: MEDICAID

## 2023-05-23 DIAGNOSIS — Z79.4 TYPE 2 DIABETES MELLITUS WITH DIABETIC NEUROPATHY, WITH LONG-TERM CURRENT USE OF INSULIN: Primary | ICD-10-CM

## 2023-05-23 DIAGNOSIS — F33.41 MAJOR DEPRESSIVE DISORDER, RECURRENT EPISODE, IN PARTIAL REMISSION: ICD-10-CM

## 2023-05-23 DIAGNOSIS — F33.1 MODERATE EPISODE OF RECURRENT MAJOR DEPRESSIVE DISORDER: ICD-10-CM

## 2023-05-23 DIAGNOSIS — J30.9 ALLERGIC RHINITIS, UNSPECIFIED SEASONALITY, UNSPECIFIED TRIGGER: ICD-10-CM

## 2023-05-23 DIAGNOSIS — G62.0 CHEMOTHERAPY-INDUCED NEUROPATHY: ICD-10-CM

## 2023-05-23 DIAGNOSIS — E11.40 TYPE 2 DIABETES MELLITUS WITH DIABETIC NEUROPATHY, WITH LONG-TERM CURRENT USE OF INSULIN: Primary | ICD-10-CM

## 2023-05-23 DIAGNOSIS — Z85.3 HISTORY OF BILATERAL BREAST CANCER: Chronic | ICD-10-CM

## 2023-05-23 DIAGNOSIS — T14.8XXA MUSCLE STRAIN: ICD-10-CM

## 2023-05-23 DIAGNOSIS — T45.1X5A CHEMOTHERAPY-INDUCED NEUROPATHY: ICD-10-CM

## 2023-05-23 RX ORDER — CETIRIZINE HYDROCHLORIDE 10 MG/1
10 TABLET ORAL DAILY
Qty: 30 TABLET | Refills: 4 | Status: SHIPPED | OUTPATIENT
Start: 2023-05-23

## 2023-05-23 RX ORDER — EMPAGLIFLOZIN 25 MG/1
TABLET, FILM COATED ORAL
COMMUNITY
Start: 2023-03-11 | End: 2023-05-24 | Stop reason: SINTOL

## 2023-05-23 RX ORDER — CARBAMAZEPINE 100 MG/1
100 TABLET, CHEWABLE ORAL 3 TIMES DAILY
Qty: 90 TABLET | Refills: 0 | Status: SHIPPED | OUTPATIENT
Start: 2023-05-23

## 2023-05-23 RX ORDER — GABAPENTIN 400 MG/1
400 CAPSULE ORAL
Qty: 30 CAPSULE | Refills: 0 | Status: SHIPPED | OUTPATIENT
Start: 2023-05-23

## 2023-05-23 RX ORDER — CYCLOBENZAPRINE HCL 10 MG
10 TABLET ORAL NIGHTLY
Qty: 30 TABLET | Refills: 0 | Status: SHIPPED | OUTPATIENT
Start: 2023-05-23

## 2023-05-23 NOTE — PROGRESS NOTES
Chief Complaint  Follow-up    Subjective          Tara Perez presents to [facility]  History of Present Illness  Patient is a 62-year-old female with past medical history of type 2 diabetes, chemotherapy induced neuropathy who presents for lab review and she is doing really well no major questions concerns issues at this time.      Objective   Vital Signs:   There were no vitals taken for this visit.    There is no height or weight on file to calculate BMI.    Review of Systems   Constitutional: Negative.    HENT: Negative.    Eyes: Negative.    Respiratory: Negative.    Cardiovascular: Negative.    Gastrointestinal: Negative.    Endocrine: Negative.    Genitourinary: Negative.    Musculoskeletal: Negative.    Skin: Negative.    Allergic/Immunologic: Negative.    Neurological: Negative.    Hematological: Negative.    Psychiatric/Behavioral: Negative.        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   Surgical History: has a past surgical history that includes Appendectomy; Tonsillectomy and adenoidectomy; and Mastectomy.   Family History: family history includes Diabetes in her brother, father, and sister.   Social History: reports that she quit smoking about 31 years ago. Her smoking use included cigarettes. She started smoking about 48 years ago. She has a 17.00 pack-year smoking history. She has never used smokeless tobacco. She reports that she does not drink alcohol and does not use drugs.      Current Outpatient Medications:   •  carBAMazepine (TEGretol) 100 MG chewable tablet, Chew 1 tablet 3 (Three) Times a Day., Disp: 90 tablet, Rfl: 0  •  cetirizine (zyrTEC) 10 MG tablet, Take 1 tablet by mouth Daily., Disp: 30 tablet, Rfl: 4  •  cyclobenzaprine (FLEXERIL) 10 MG tablet, Take 1 tablet by mouth  Every Night., Disp: 30 tablet, Rfl: 0  •  gabapentin (NEURONTIN) 400 MG capsule, Take 1 capsule by mouth every night at bedtime., Disp: 30 capsule, Rfl: 0  •  amLODIPine-benazepril (LOTREL) 5-40 MG per capsule, TAKE ONE CAPSULE BY MOUTH EVERY DAY, Disp: 90 capsule, Rfl: 0  •  atorvastatin (LIPITOR) 80 MG tablet, Take 1 tablet by mouth Daily for 360 days., Disp: 90 tablet, Rfl: 3  •  cloNIDine (CATAPRES) 0.1 MG tablet, TAKE ONE TABLET BY MOUTH 2 TIMES A DAY, Disp: 60 tablet, Rfl: 5  •  Continuous Blood Gluc  (Dexcom G6 ) device, 1 each Take As Directed., Disp: 1 each, Rfl: 0  •  Continuous Blood Gluc Sensor (Dexcom G6 Sensor), Every 10 (Ten) Days., Disp: 9 each, Rfl: 3  •  Continuous Blood Gluc Transmit (Dexcom G6 Transmitter) misc, 1 each Every 3 (Three) Months., Disp: 1 each, Rfl: 3  •  Easy Touch Lancets 30G/Twist misc, USE AS DIRECTED TO TEST BLOOD SUGAR 3 TIMES DAILY, Disp: , Rfl:   •  ezetimibe (ZETIA) 10 MG tablet, Take 1 tablet by mouth every night at bedtime., Disp: 90 tablet, Rfl: 3  •  insulin aspart prot & aspart (NovoLOG 70/30 FlexPen ReliOn) (70-30) 100 UNIT/ML suspension pen-injector injection, Inject 25u under the skin before breakfast and 60u under the skin before dinner, titrate dose as directed for max daily dose 100u, Disp: 30 mL, Rfl: 5  •  Insulin Pen Needle (B-D ULTRAFINE III SHORT PEN) 31G X 8 MM misc, Use as directed twice daily, Disp: 90 each, Rfl: 11  •  Jardiance 25 MG tablet tablet, , Disp: , Rfl:   •  OneTouch Ultra test strip, Use as instructed to test twice daily, Disp: 100 each, Rfl: 11  •  PARoxetine (PAXIL) 40 MG tablet, TAKE ONE TABLET BY MOUTH EVERY DAY, Disp: 90 tablet, Rfl: 0  •  Semaglutide, 2 MG/DOSE, (Ozempic, 2 MG/DOSE,) 8 MG/3ML solution pen-injector, Inject 2 mg under the skin into the appropriate area as directed 1 (One) Time Per Week., Disp: 3 mL, Rfl: 3  •  vitamin D (ERGOCALCIFEROL) 1.25 MG (75732 UT) capsule capsule, Take 50,000 Units by mouth 2 (Two)  Times a Week. Pt states she only takes this once a month, Disp: , Rfl:     Allergies: Penicillins    Physical Exam  Constitutional:       Appearance: Normal appearance. She is normal weight.   HENT:      Head: Normocephalic and atraumatic.   Eyes:      Conjunctiva/sclera: Conjunctivae normal.   Cardiovascular:      Rate and Rhythm: Normal rate and regular rhythm.   Pulmonary:      Effort: Pulmonary effort is normal.      Breath sounds: Normal breath sounds.   Musculoskeletal:         General: Normal range of motion.      Cervical back: Normal range of motion and neck supple.   Skin:     General: Skin is warm and dry.   Neurological:      General: No focal deficit present.      Mental Status: She is alert and oriented to person, place, and time. Mental status is at baseline.   Psychiatric:         Mood and Affect: Mood normal.         Behavior: Behavior normal.         Thought Content: Thought content normal.         Judgment: Judgment normal.          Result Review :                   Assessment and Plan    Diagnoses and all orders for this visit:    1. Type 2 diabetes mellitus with diabetic neuropathy, with long-term current use of insulin (Primary)  -     gabapentin (NEURONTIN) 400 MG capsule; Take 1 capsule by mouth every night at bedtime.  Dispense: 30 capsule; Refill: 0  A1c is great at 5.8 continue meds and monitoring  Continue gabapentin for her diabetic neuropathy and chemotherapy-induced neuropathy  Up-to-date [facility] contract    2. Allergic rhinitis, unspecified seasonality, unspecified trigger  -     cetirizine (zyrTEC) 10 MG tablet; Take 1 tablet by mouth Daily.  Dispense: 30 tablet; Refill: 4  Stable continue meds    3. Chemotherapy-induced neuropathy  -     gabapentin (NEURONTIN) 400 MG capsule; Take 1 capsule by mouth every night at bedtime.  Dispense: 30 capsule; Refill: 0    4. Muscle strain  -     cyclobenzaprine (FLEXERIL) 10 MG tablet; Take 1 tablet by mouth Every Night.  Dispense: 30 tablet;  Refill: 0  Stable on meds    5. Major depressive disorder, recurrent episode, in partial remission  -     carBAMazepine (TEGretol) 100 MG chewable tablet; Chew 1 tablet 3 (Three) Times a Day.  Dispense: 90 tablet; Refill: 0  Stable on meds    6. History of bilateral breast cancer  Chronic resolved    7. Moderate episode of recurrent major depressive disorder  Stable on meds    MEDICATION SIDE EFFECTS, RISKS AND SIDE EFFECTS HAVE BEEN DISCUSSED WITH PATIENT. PATIENT NOTES UNDERSTANDING. IF ANY CONCERN OR QUESTION REGARDING MEDICATION PLEASE CONTACT.       Follow Up   No follow-ups on file.  Patient was given instructions and counseling regarding her condition or for health maintenance advice. Please see specific information pulled into the AVS if appropriate.     Naomi Sandy DO

## 2023-05-24 ENCOUNTER — OFFICE VISIT (OUTPATIENT)
Dept: ENDOCRINOLOGY | Facility: CLINIC | Age: 63
End: 2023-05-24
Payer: MEDICAID

## 2023-05-24 VITALS
OXYGEN SATURATION: 97 % | BODY MASS INDEX: 34.31 KG/M2 | SYSTOLIC BLOOD PRESSURE: 120 MMHG | WEIGHT: 201 LBS | HEIGHT: 64 IN | DIASTOLIC BLOOD PRESSURE: 74 MMHG | HEART RATE: 71 BPM

## 2023-05-24 DIAGNOSIS — E78.2 MIXED HYPERLIPIDEMIA: ICD-10-CM

## 2023-05-24 DIAGNOSIS — Z79.4 TYPE 2 DIABETES MELLITUS WITH BOTH EYES AFFECTED BY MILD NONPROLIFERATIVE RETINOPATHY WITHOUT MACULAR EDEMA, WITH LONG-TERM CURRENT USE OF INSULIN: Primary | ICD-10-CM

## 2023-05-24 DIAGNOSIS — E11.3293 TYPE 2 DIABETES MELLITUS WITH BOTH EYES AFFECTED BY MILD NONPROLIFERATIVE RETINOPATHY WITHOUT MACULAR EDEMA, WITH LONG-TERM CURRENT USE OF INSULIN: Primary | ICD-10-CM

## 2023-05-24 RX ORDER — INSULIN ASPART 100 [IU]/ML
INJECTION, SUSPENSION SUBCUTANEOUS
Qty: 30 ML | Refills: 5 | Status: SHIPPED | OUTPATIENT
Start: 2023-05-24

## 2023-05-24 RX ORDER — PROCHLORPERAZINE 25 MG/1
1 SUPPOSITORY RECTAL
Qty: 1 EACH | Refills: 3 | Status: SHIPPED | OUTPATIENT
Start: 2023-05-24

## 2023-05-24 RX ORDER — EZETIMIBE 10 MG/1
10 TABLET ORAL
Qty: 90 TABLET | Refills: 3 | Status: SHIPPED | OUTPATIENT
Start: 2023-05-24

## 2023-05-24 RX ORDER — ATORVASTATIN CALCIUM 80 MG/1
80 TABLET, FILM COATED ORAL DAILY
Qty: 90 TABLET | Refills: 3 | Status: SHIPPED | OUTPATIENT
Start: 2023-05-24 | End: 2024-05-18

## 2023-05-24 RX ORDER — PROCHLORPERAZINE 25 MG/1
SUPPOSITORY RECTAL
Qty: 9 EACH | Refills: 3 | Status: SHIPPED | OUTPATIENT
Start: 2023-05-24

## 2023-05-24 NOTE — ASSESSMENT & PLAN NOTE
-Lipids are uncontrolled due to LDL of 93 with goal LDL less than 70  -Recommend continuing atorvastatin 80 mg daily and ezetimibe 10 mg daily  -Continue good glycemic control  -Recommend improved diet with reduced saturated fats and simple sugars  -Repeat fasting lipid panel in 6 months

## 2023-05-24 NOTE — ASSESSMENT & PLAN NOTE
-Patient overall has good control of her diabetes at this time  -Suspect her postprandial hyperglycemia after supper time is due to taking her insulin shot too late and then delayed hypoglycemia is due to too high of an insulin dose  -Recommend continuing Ozempic 2 mg weekly  -Recommend continuing 70/30 25 units with breakfast  -Recommend reducing 70/30 to 55 units with supper and taking it 15 to 20 minutes prior to eating  -Continue Dexcom G6  -Continue benazepril; blood pressure today 120/74        DM Health Maintenance:  Ophtho: done 10/2022 and reports nonproliferative retinopathy that is stable (see media tab); recommend annual follow-up  Monofilament / Foot exam: abnormal as noted above, completed today, recommend continuing with diabetic shoes  Lipids/Statin: Continue statin and Zetia with last FLP showing LDL 93 on 5/16/23  RACHAEL:  Negative on 10/19/2022  TSH: 0.780 with total T4 5.6 On 5/16/2023   Aspirin: not taking

## 2023-06-14 DIAGNOSIS — Z79.4 TYPE 2 DIABETES MELLITUS WITH BOTH EYES AFFECTED BY MILD NONPROLIFERATIVE RETINOPATHY WITHOUT MACULAR EDEMA, WITH LONG-TERM CURRENT USE OF INSULIN: ICD-10-CM

## 2023-06-14 DIAGNOSIS — E78.2 MIXED HYPERLIPIDEMIA: ICD-10-CM

## 2023-06-14 DIAGNOSIS — E11.65 TYPE 2 DIABETES MELLITUS WITH HYPERGLYCEMIA, WITH LONG-TERM CURRENT USE OF INSULIN: ICD-10-CM

## 2023-06-14 DIAGNOSIS — E11.3293 TYPE 2 DIABETES MELLITUS WITH BOTH EYES AFFECTED BY MILD NONPROLIFERATIVE RETINOPATHY WITHOUT MACULAR EDEMA, WITH LONG-TERM CURRENT USE OF INSULIN: ICD-10-CM

## 2023-06-14 DIAGNOSIS — Z79.4 TYPE 2 DIABETES MELLITUS WITH HYPERGLYCEMIA, WITH LONG-TERM CURRENT USE OF INSULIN: ICD-10-CM

## 2023-06-14 RX ORDER — PROCHLORPERAZINE 25 MG/1
1 SUPPOSITORY RECTAL
Qty: 1 EACH | Refills: 3 | Status: CANCELLED | OUTPATIENT
Start: 2023-06-14

## 2023-06-14 RX ORDER — LANCETS 30 GAUGE
EACH MISCELLANEOUS
Qty: 100 EACH | Refills: 11 | Status: SHIPPED | OUTPATIENT
Start: 2023-06-14

## 2023-06-14 RX ORDER — ATORVASTATIN CALCIUM 80 MG/1
80 TABLET, FILM COATED ORAL DAILY
Qty: 90 TABLET | Refills: 3 | Status: CANCELLED | OUTPATIENT
Start: 2023-06-14 | End: 2024-06-08

## 2023-06-14 RX ORDER — SEMAGLUTIDE 2.68 MG/ML
2 INJECTION, SOLUTION SUBCUTANEOUS WEEKLY
Qty: 3 ML | Refills: 3 | Status: SHIPPED | OUTPATIENT
Start: 2023-06-14

## 2023-06-14 RX ORDER — PEN NEEDLE, DIABETIC 31 GX5/16"
NEEDLE, DISPOSABLE MISCELLANEOUS
Qty: 90 EACH | Refills: 11 | Status: SHIPPED | OUTPATIENT
Start: 2023-06-14

## 2023-06-14 RX ORDER — PROCHLORPERAZINE 25 MG/1
SUPPOSITORY RECTAL
Qty: 9 EACH | Refills: 3 | Status: CANCELLED | OUTPATIENT
Start: 2023-06-14

## 2023-06-14 RX ORDER — BLOOD SUGAR DIAGNOSTIC
STRIP MISCELLANEOUS
Qty: 100 EACH | Refills: 11 | Status: SHIPPED | OUTPATIENT
Start: 2023-06-14

## 2023-06-14 RX ORDER — EZETIMIBE 10 MG/1
10 TABLET ORAL
Qty: 90 TABLET | Refills: 3 | Status: SHIPPED | OUTPATIENT
Start: 2023-06-14

## 2023-06-14 RX ORDER — INSULIN ASPART 100 [IU]/ML
INJECTION, SUSPENSION SUBCUTANEOUS
Qty: 30 ML | Refills: 5 | Status: SHIPPED | OUTPATIENT
Start: 2023-06-14

## 2023-06-14 NOTE — TELEPHONE ENCOUNTER
Rx Refill Note    Requested Prescriptions     Pending Prescriptions Disp Refills    atorvastatin (LIPITOR) 80 MG tablet 90 tablet 3     Sig: Take 1 tablet by mouth Daily for 360 days.    Continuous Blood Gluc Sensor (Dexcom G6 Sensor) 9 each 3     Sig: Every 10 (Ten) Days.    Continuous Blood Gluc Transmit (Dexcom G6 Transmitter) misc 1 each 3     Si each Every 3 (Three) Months.    Easy Touch Lancets 30G/Twist misc      ezetimibe (ZETIA) 10 MG tablet 90 tablet 3     Sig: Take 1 tablet by mouth every night at bedtime.    insulin aspart prot & aspart (NovoLOG 70/30 FlexPen ReliOn) (70-30) 100 UNIT/ML suspension pen-injector injection 30 mL 5     Sig: Inject 25u under the skin before breakfast and 55u under the skin before dinner, titrate dose as directed for max daily dose 100u    Insulin Pen Needle (B-D ULTRAFINE III SHORT PEN) 31G X 8 MM misc 90 each 11     Sig: Use as directed twice daily    OneTouch Ultra test strip 100 each 11     Sig: Use as instructed to test twice daily    Semaglutide, 2 MG/DOSE, (Ozempic, 2 MG/DOSE,) 8 MG/3ML solution pen-injector 3 mL 3     Sig: Inject 2 mg under the skin into the appropriate area as directed 1 (One) Time Per Week.        Last office visit with prescribing clinician: 2023       Next office visit with prescribing clinician: 2023     {    Fern Oliva MA  23, 08:55 EDT

## 2023-06-14 NOTE — TELEPHONE ENCOUNTER
PATIENT STATES THAT NEW PHARMACY IS NEEDING NEW RX FOR HER MEDICATIONS. HER PREVIOUS PHARMACY HAS CLOSED.

## 2023-06-23 PROBLEM — J30.9 ALLERGIC RHINITIS: Status: ACTIVE | Noted: 2023-06-23

## 2023-06-23 PROBLEM — F32.5 MAJOR DEPRESSION IN REMISSION: Status: ACTIVE | Noted: 2022-05-17

## 2023-08-04 DIAGNOSIS — E78.2 MIXED HYPERLIPIDEMIA: ICD-10-CM

## 2023-08-04 RX ORDER — EZETIMIBE 10 MG/1
10 TABLET ORAL
Qty: 90 TABLET | Refills: 3 | OUTPATIENT
Start: 2023-08-04

## 2023-08-04 NOTE — TELEPHONE ENCOUNTER
Caller: Tara Perez    Relationship: Self    Best call back number: [phone]     Requested Prescriptions:   Requested Prescriptions     Pending Prescriptions Disp Refills    ezetimibe (ZETIA) 10 MG tablet 90 tablet 3     Sig: Take 1 tablet by mouth every night at bedtime.        Pharmacy where request should be sent: [address] [phone]  - [phone] [facility]     Last office visit with prescribing clinician: 6/23/2023   Last telemedicine visit with prescribing clinician: Visit date not found   Next office visit with prescribing clinician: 11/21/2023     Additional details provided by patient: PATIENT HAS 3 PILLS LEFT.      Does the patient have less than a 3 day supply:  [] Yes  [x] No    Would you like a call back once the refill request has been completed: [] Yes [x] No    If the office needs to give you a call back, can they leave a voicemail: [] Yes [x] No    Steffi Liang   08/04/23 10:14 EDT      No

## 2023-09-05 NOTE — PROGRESS NOTES
Chief complaint/Reason for consult: T2DM    HPI: Ms. Perez is a 62-year-old female with type 2 diabetes, history of breast cancer, hypertension, depression and mixed hyperlipidemia who comes for follow-up of type 2 diabetes.  She was last seen in this office on 5/24/2023 and reports since that time no significant changes in her health. She has gained a small amount of weight, reports eating a lot of tomato and dye sandwiches.       # [id number], controlled with complications  # Diabetic retinopathy   - Diagnosed: around 2014   - Current regimen includes: 70/30 25 units with breakfast and 55 units with supper and Ozempic 2 mg weekly  - Tried metformin in the past but had intolerable GI issues   - Had a lot of yeast infections so SGLT2 inhibitors were stopped, not interested in trying it again  - Compliance with medications is good, rarely misses any doses although she is late with her insulin injections several times per week   - Eats one main meal per day (dinner), a smaller breakfast and has some snacks during the day   - She continues to have regular soda at dinner with her medications in the evening   - Access to food is an issue due to her fixed income   - HbA1c: 5.4% today  - Using Dexcom G6      CGM Download  -Dates reviewed: 8/24/2023-9/6/2023  -Data: 93% wear time, average glucose 130 mg/dL, standard deviation 31 mg/dL, GMI 6.4%, 0% very high, 7% high, 91% time in range, 1% low and less than 1% very low  -Interpretation: Overall good glycemic control with minimal variability, occasional increased variability in the evening with borderline hyperglycemia and hypoglycemia     - Hypoglycemia occurs worse in the evening just prior to bed  - Hyperglycemia occurs worse in the evening just after dinner  - 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), stable on gabapentin  - Patient denies gastroparesis   - Patient reports rotating injection sites   - Patient without known ASCVD   - taking ACEi; blood  "pressure today 120/72     DM Health Maintenance:  Ophtho: done 10/2022 and reports nonproliferative retinopathy that is stable (see media tab)   Monofilament / Foot exam: abnormal, completed 5/24/2023, has diabetic shoes which has helped a lot  Lipids/Statin: taking a statin and Zetia (ran out of few weeks ago) with last FLP showing LDL 93 on 5/16/23  RACHAEL: Negative on 10/19/2022  TSH: 0.780 with total T4 5.6 On 5/16/2023   Aspirin: not taking      # Mixed hyperlipidemia  - Compliant with atorvastatin 80 mg daily and ezetimibe 10 mg daily (ran out of ezetimibe a few weeks ago and needs a refill), no reported side effects on these medications  - FLP showing total cholesterol 162, HDL 46, LDL 93 and triglycerides 127 done 5/16/2023    Past medical history, past surgical history, family history and social history reviewed within this encounter.     Review of Systems   Constitutional:  Positive for unexpected weight change (gain). Negative for activity change and fatigue.   HENT:  Positive for dental problem. Negative for trouble swallowing and voice change.    Eyes:  Negative for visual disturbance.   Respiratory:  Negative for shortness of breath.    Cardiovascular:  Negative for chest pain.   Gastrointestinal:  Negative for abdominal pain, constipation, nausea and vomiting.   Endocrine: Negative for cold intolerance and heat intolerance.   Genitourinary:  Negative for dysuria.   Musculoskeletal:  Negative for gait problem.   Skin:  Negative for rash and wound.   Neurological:  Positive for numbness.   Psychiatric/Behavioral:  Negative for agitation and confusion.       /72   Pulse 77   Ht 162.6 cm (64\")   Wt 92.1 kg (203 lb)   SpO2 97%   BMI 34.84 kg/m²      Physical Exam  Vitals reviewed.   Constitutional:       General: She is not in acute distress.     Appearance: She is obese.   HENT:      Head: Normocephalic.      Nose: Nose normal.      Mouth/Throat:      Comments: Poor dentition   Eyes:      " Conjunctiva/sclera: Conjunctivae normal.   Cardiovascular:      Rate and Rhythm: Normal rate.   Pulmonary:      Effort: Pulmonary effort is normal.   Abdominal:      Tenderness: There is no guarding.   Musculoskeletal:         General: Normal range of motion.   Skin:     General: Skin is warm and dry.   Neurological:      Mental Status: She is alert and oriented to person, place, and time.   Psychiatric:         Mood and Affect: Mood normal.         Behavior: Behavior normal.         Thought Content: Thought content normal.      Labs and images reviewed as noted in the HPI    Assessment and plan:    Diagnoses and all orders for this visit:    1. Type 2 diabetes mellitus with both eyes affected by mild nonproliferative retinopathy without macular edema, with long-term current use of insulin (Primary)  Assessment & Plan:  -Diabetes is overall well controlled  -Complications include diabetic retinopathy  -Patient has polyneuropathy likely due to chemotherapy from breast cancer and less likely due to diabetes  -Recommend continued good glycemic control to prevent progression of complications and new complications from arising  -Patient likely with borderline hyperglycemia after supper and bedtime hypoglycemia due to to higher dose of insulin and administering it late  -Change insulin to: 70/30 25 units with breakfast and 50 units with supper  -Give insulin 20 to 30 minutes prior to eating  -Give a half dose of insulin if skipping a meal or if she does not have regular soda with supper  -Continue Ozempic 2.0 mg weekly  -Continue Dexcom CGM  -Continue ACEi; blood pressure today 120/72     DM Health Maintenance:  Ophtho: done 10/2022 and reports nonproliferative retinopathy that is stable (see media tab); recommend having repeat exam next month  Monofilament / Foot exam: abnormal, completed 5/24/2023, recommend continuing to wear diabetic shoes and doing nightly foot inspection  Lipids/Statin: Continue atorvastatin 80 mg  daily, resume Zetia with last FLP showing LDL 93 on 5/16/23  RACHAEL: Negative on 10/19/2022; repeat at next visit  TSH: 0.780 with total T4 5.6 On 5/16/2023   Aspirin: not taking        Orders:  -     POC Glycosylated Hemoglobin (Hb A1C)  -     insulin aspart prot & aspart (NovoLOG 70/30 FlexPen ReliOn) (70-30) 100 UNIT/ML suspension pen-injector injection; Inject 25u under the skin before breakfast and 50u under the skin before dinner, titrate dose as directed for max daily dose 100u  Dispense: 30 mL; Refill: 5  -     Semaglutide, 2 MG/DOSE, (Ozempic, 2 MG/DOSE,) 8 MG/3ML solution pen-injector; Inject 2 mg under the skin into the appropriate area as directed 1 (One) Time Per Week.  Dispense: 3 mL; Refill: 11    2. Mixed hyperlipidemia  Assessment & Plan:  -Lipids are uncontrolled  -Patient needs to reduce saturated fats in her diet  -Continue atorvastatin 80 mg daily  -Resume ezetimibe 10 mg daily  -Repeat fasting lipid panel at next visit and if LDL remains above goal we will consider the addition of bempedoic acid as long as uric acid levels are reasonable    Orders:  -     ezetimibe (ZETIA) 10 MG tablet; Take 1 tablet by mouth every night at bedtime.  Dispense: 90 tablet; Refill: 3       Return in about 3 months (around 12/6/2023) for T2DM.     Electronically signed by: Kyle S Rosenstein, MD

## 2023-09-06 ENCOUNTER — OFFICE VISIT (OUTPATIENT)
Dept: ENDOCRINOLOGY | Facility: CLINIC | Age: 63
End: 2023-09-06
Payer: MEDICAID

## 2023-09-06 VITALS
HEART RATE: 77 BPM | HEIGHT: 64 IN | WEIGHT: 203 LBS | OXYGEN SATURATION: 97 % | DIASTOLIC BLOOD PRESSURE: 72 MMHG | SYSTOLIC BLOOD PRESSURE: 120 MMHG | BODY MASS INDEX: 34.66 KG/M2

## 2023-09-06 DIAGNOSIS — Z79.4 TYPE 2 DIABETES MELLITUS WITH BOTH EYES AFFECTED BY MILD NONPROLIFERATIVE RETINOPATHY WITHOUT MACULAR EDEMA, WITH LONG-TERM CURRENT USE OF INSULIN: Primary | ICD-10-CM

## 2023-09-06 DIAGNOSIS — E11.3293 TYPE 2 DIABETES MELLITUS WITH BOTH EYES AFFECTED BY MILD NONPROLIFERATIVE RETINOPATHY WITHOUT MACULAR EDEMA, WITH LONG-TERM CURRENT USE OF INSULIN: Primary | ICD-10-CM

## 2023-09-06 DIAGNOSIS — E78.2 MIXED HYPERLIPIDEMIA: ICD-10-CM

## 2023-09-06 LAB
EXPIRATION DATE: NORMAL
HBA1C MFR BLD: 5.4 %
Lab: NORMAL

## 2023-09-06 PROCEDURE — 3078F DIAST BP <80 MM HG: CPT | Performed by: HOSPITALIST

## 2023-09-06 PROCEDURE — 83036 HEMOGLOBIN GLYCOSYLATED A1C: CPT | Performed by: HOSPITALIST

## 2023-09-06 PROCEDURE — 95251 CONT GLUC MNTR ANALYSIS I&R: CPT | Performed by: HOSPITALIST

## 2023-09-06 PROCEDURE — 3044F HG A1C LEVEL LT 7.0%: CPT | Performed by: HOSPITALIST

## 2023-09-06 PROCEDURE — 3074F SYST BP LT 130 MM HG: CPT | Performed by: HOSPITALIST

## 2023-09-06 PROCEDURE — 99214 OFFICE O/P EST MOD 30 MIN: CPT | Performed by: HOSPITALIST

## 2023-09-06 PROCEDURE — 1160F RVW MEDS BY RX/DR IN RCRD: CPT | Performed by: HOSPITALIST

## 2023-09-06 PROCEDURE — 1159F MED LIST DOCD IN RCRD: CPT | Performed by: HOSPITALIST

## 2023-09-06 RX ORDER — INSULIN ASPART 100 [IU]/ML
INJECTION, SUSPENSION SUBCUTANEOUS
Qty: 30 ML | Refills: 5 | Status: SHIPPED | OUTPATIENT
Start: 2023-09-06

## 2023-09-06 RX ORDER — SEMAGLUTIDE 2.68 MG/ML
2 INJECTION, SOLUTION SUBCUTANEOUS WEEKLY
Qty: 3 ML | Refills: 11 | Status: SHIPPED | OUTPATIENT
Start: 2023-09-06

## 2023-09-06 RX ORDER — EZETIMIBE 10 MG/1
10 TABLET ORAL
Qty: 90 TABLET | Refills: 3 | Status: SHIPPED | OUTPATIENT
Start: 2023-09-06

## 2023-09-06 NOTE — ASSESSMENT & PLAN NOTE
-Diabetes is overall well controlled  -Complications include diabetic retinopathy  -Patient has polyneuropathy likely due to chemotherapy from breast cancer and less likely due to diabetes  -Recommend continued good glycemic control to prevent progression of complications and new complications from arising  -Patient likely with borderline hyperglycemia after supper and bedtime hypoglycemia due to to higher dose of insulin and administering it late  -Change insulin to: 70/30 25 units with breakfast and 50 units with supper  -Give insulin 20 to 30 minutes prior to eating  -Give a half dose of insulin if skipping a meal or if she does not have regular soda with supper  -Continue Ozempic 2.0 mg weekly  -Continue Dexcom CGM  -Continue ACEi; blood pressure today 120/72     DM Health Maintenance:  Ophtho: done 10/2022 and reports nonproliferative retinopathy that is stable (see media tab); recommend having repeat exam next month  Monofilament / Foot exam: abnormal, completed 5/24/2023, recommend continuing to wear diabetic shoes and doing nightly foot inspection  Lipids/Statin: Continue atorvastatin 80 mg daily, resume Zetia with last FLP showing LDL 93 on 5/16/23  RACHAEL: Negative on 10/19/2022; repeat at next visit  TSH: 0.780 with total T4 5.6 On 5/16/2023   Aspirin: not taking

## 2023-09-06 NOTE — ASSESSMENT & PLAN NOTE
-Lipids are uncontrolled  -Patient needs to reduce saturated fats in her diet  -Continue atorvastatin 80 mg daily  -Resume ezetimibe 10 mg daily  -Repeat fasting lipid panel at next visit and if LDL remains above goal we will consider the addition of bempedoic acid as long as uric acid levels are reasonable

## 2023-09-21 DIAGNOSIS — T45.1X5A CHEMOTHERAPY-INDUCED NEUROPATHY: ICD-10-CM

## 2023-09-21 DIAGNOSIS — J30.9 ALLERGIC RHINITIS, UNSPECIFIED SEASONALITY, UNSPECIFIED TRIGGER: ICD-10-CM

## 2023-09-21 DIAGNOSIS — G62.0 CHEMOTHERAPY-INDUCED NEUROPATHY: ICD-10-CM

## 2023-09-21 DIAGNOSIS — E78.2 MIXED HYPERLIPIDEMIA: ICD-10-CM

## 2023-09-21 RX ORDER — ATORVASTATIN CALCIUM 80 MG/1
80 TABLET, FILM COATED ORAL DAILY
Qty: 90 TABLET | Refills: 1 | OUTPATIENT
Start: 2023-09-21 | End: 2024-09-15

## 2023-09-21 RX ORDER — GABAPENTIN 400 MG/1
400 CAPSULE ORAL
Qty: 90 CAPSULE | Refills: 1 | OUTPATIENT
Start: 2023-09-21

## 2023-09-21 RX ORDER — CETIRIZINE HYDROCHLORIDE 10 MG/1
10 TABLET ORAL DAILY
Qty: 90 TABLET | Refills: 1 | OUTPATIENT
Start: 2023-09-21

## 2023-09-21 NOTE — TELEPHONE ENCOUNTER
Caller: Perez Tara    Relationship: Self    Best call back number: [phone]     Requested Prescriptions:   Requested Prescriptions     Pending Prescriptions Disp Refills    gabapentin (NEURONTIN) 400 MG capsule 90 capsule 1     Sig: Take 1 capsule by mouth every night at bedtime.    atorvastatin (LIPITOR) 80 MG tablet 90 tablet 1     Sig: Take 1 tablet by mouth Daily for 360 days.    cetirizine (zyrTEC) 10 MG tablet 90 tablet 1     Sig: Take 1 tablet by mouth Daily.        Pharmacy where request should be sent:  [address] [phone] [facility] [phone] FX     Last office visit with prescribing clinician: 6/23/2023   Last telemedicine visit with prescribing clinician: Visit date not found   Next office visit with prescribing clinician: 11/21/2023     Additional details provided by patient: PLEASE SEND 90 DAY SUPPLY WITH REFILLS    Does the patient have less than a 3 day supply:  [x] Yes    Would you like a call back once the refill request has been completed: [x] No OUT OF MEDICATION    If the office needs to give you a call back, can they leave a voicemail: [x] Yes     Radha Mcgrath Rep   09/21/23 08:19 EDT

## 2023-10-20 DIAGNOSIS — T45.1X5A CHEMOTHERAPY-INDUCED NEUROPATHY: ICD-10-CM

## 2023-10-20 DIAGNOSIS — I10 PRIMARY HYPERTENSION: ICD-10-CM

## 2023-10-20 DIAGNOSIS — G62.0 CHEMOTHERAPY-INDUCED NEUROPATHY: ICD-10-CM

## 2023-10-20 RX ORDER — CARBAMAZEPINE 100 MG/1
TABLET, CHEWABLE ORAL
Qty: 90 TABLET | Refills: 0 | Status: SHIPPED | OUTPATIENT
Start: 2023-10-20

## 2023-10-20 RX ORDER — AMLODIPINE AND BENAZEPRIL HYDROCHLORIDE 5; 40 MG/1; MG/1
1 CAPSULE ORAL DAILY
Qty: 90 CAPSULE | Refills: 0 | Status: SHIPPED | OUTPATIENT
Start: 2023-10-20

## 2023-10-25 DIAGNOSIS — E11.3293 TYPE 2 DIABETES MELLITUS WITH BOTH EYES AFFECTED BY MILD NONPROLIFERATIVE RETINOPATHY WITHOUT MACULAR EDEMA, WITH LONG-TERM CURRENT USE OF INSULIN: ICD-10-CM

## 2023-10-25 DIAGNOSIS — Z79.4 TYPE 2 DIABETES MELLITUS WITH BOTH EYES AFFECTED BY MILD NONPROLIFERATIVE RETINOPATHY WITHOUT MACULAR EDEMA, WITH LONG-TERM CURRENT USE OF INSULIN: ICD-10-CM

## 2023-10-25 NOTE — TELEPHONE ENCOUNTER
Caller: Perez Tara    Relationship: Self    Best call back number: [phone]     Requested Prescriptions:   Requested Prescriptions     Pending Prescriptions Disp Refills    Semaglutide, 2 MG/DOSE, (Ozempic, 2 MG/DOSE,) 8 MG/3ML solution pen-injector 3 mL 11     Sig: Inject 2 mg under the skin into the appropriate area as directed 1 (One) Time Per Week.        Pharmacy where request should be sent:      [address] [phone] [facility] [phone] FX       Last office visit with prescribing clinician: 9/6/2023   Last telemedicine visit with prescribing clinician: Visit date not found   Next office visit with prescribing clinician: 12/6/2023     Additional details provided by patient: N/A    Does the patient have less than a 3 day supply:  [] Yes  [x] No    Would you like a call back once the refill request has been completed: [] Yes [x] No    If the office needs to give you a call back, can they leave a voicemail: [] Yes [x] No    Radha Neumann Rep   10/25/23 10:06 EDT

## 2023-10-26 RX ORDER — SEMAGLUTIDE 2.68 MG/ML
2 INJECTION, SOLUTION SUBCUTANEOUS WEEKLY
Qty: 3 ML | Refills: 11 | Status: SHIPPED | OUTPATIENT
Start: 2023-10-26

## 2023-10-26 NOTE — TELEPHONE ENCOUNTER
Rx Refill Note  Requested Prescriptions     Pending Prescriptions Disp Refills    Semaglutide, 2 MG/DOSE, (Ozempic, 2 MG/DOSE,) 8 MG/3ML solution pen-injector 3 mL 11     Sig: Inject 2 mg under the skin into the appropriate area as directed 1 (One) Time Per Week.      Last office visit with prescribing clinician: 9/6/2023     Next office visit with prescribing clinician: 12/6/2023       Ella Calderon MA  10/26/23, 11:53 EDT

## 2023-11-14 ENCOUNTER — LAB (OUTPATIENT)
Dept: FAMILY MEDICINE CLINIC | Facility: CLINIC | Age: 63
End: 2023-11-14
Payer: MEDICAID

## 2023-11-14 DIAGNOSIS — E78.2 MIXED HYPERLIPIDEMIA: ICD-10-CM

## 2023-11-14 DIAGNOSIS — Z79.4 TYPE 2 DIABETES MELLITUS WITH BOTH EYES AFFECTED BY MILD NONPROLIFERATIVE RETINOPATHY WITHOUT MACULAR EDEMA, WITH LONG-TERM CURRENT USE OF INSULIN: ICD-10-CM

## 2023-11-14 DIAGNOSIS — Z79.899 HIGH RISK MEDICATION USE: ICD-10-CM

## 2023-11-14 DIAGNOSIS — E11.3293 TYPE 2 DIABETES MELLITUS WITH BOTH EYES AFFECTED BY MILD NONPROLIFERATIVE RETINOPATHY WITHOUT MACULAR EDEMA, WITH LONG-TERM CURRENT USE OF INSULIN: ICD-10-CM

## 2023-11-14 DIAGNOSIS — E55.9 VITAMIN D DEFICIENCY: ICD-10-CM

## 2023-11-14 PROCEDURE — 36415 COLL VENOUS BLD VENIPUNCTURE: CPT | Performed by: PHYSICIAN ASSISTANT

## 2023-11-15 LAB
25(OH)D3+25(OH)D2 SERPL-MCNC: 42.4 NG/ML (ref 30–100)
ALBUMIN SERPL-MCNC: 4.4 G/DL (ref 3.9–4.9)
ALBUMIN/GLOB SERPL: 1.6 {RATIO} (ref 1.2–2.2)
ALP SERPL-CCNC: 83 IU/L (ref 44–121)
ALT SERPL-CCNC: 12 IU/L (ref 0–32)
AST SERPL-CCNC: 23 IU/L (ref 0–40)
BASOPHILS # BLD AUTO: 0.1 X10E3/UL (ref 0–0.2)
BASOPHILS NFR BLD AUTO: 1 %
BILIRUB SERPL-MCNC: 0.2 MG/DL (ref 0–1.2)
BUN SERPL-MCNC: 13 MG/DL (ref 8–27)
BUN/CREAT SERPL: 15 (ref 12–28)
CALCIUM SERPL-MCNC: 9.1 MG/DL (ref 8.7–10.3)
CHLORIDE SERPL-SCNC: 103 MMOL/L (ref 96–106)
CHOLEST SERPL-MCNC: 164 MG/DL (ref 100–199)
CK SERPL-CCNC: 325 U/L (ref 32–182)
CO2 SERPL-SCNC: 25 MMOL/L (ref 20–29)
CREAT SERPL-MCNC: 0.89 MG/DL (ref 0.57–1)
EGFRCR SERPLBLD CKD-EPI 2021: 74 ML/MIN/1.73
EOSINOPHIL # BLD AUTO: 0.6 X10E3/UL (ref 0–0.4)
EOSINOPHIL NFR BLD AUTO: 7 %
ERYTHROCYTE [DISTWIDTH] IN BLOOD BY AUTOMATED COUNT: 13 % (ref 11.7–15.4)
FOLATE SERPL-MCNC: <2 NG/ML
GLOBULIN SER CALC-MCNC: 2.7 G/DL (ref 1.5–4.5)
GLUCOSE SERPL-MCNC: 125 MG/DL (ref 70–99)
HBA1C MFR BLD: 5.9 % (ref 4.8–5.6)
HCT VFR BLD AUTO: 41.7 % (ref 34–46.6)
HDLC SERPL-MCNC: 55 MG/DL
HGB BLD-MCNC: 14 G/DL (ref 11.1–15.9)
IMM GRANULOCYTES # BLD AUTO: 0 X10E3/UL (ref 0–0.1)
IMM GRANULOCYTES NFR BLD AUTO: 0 %
LDLC SERPL CALC-MCNC: 87 MG/DL (ref 0–99)
LYMPHOCYTES # BLD AUTO: 3.2 X10E3/UL (ref 0.7–3.1)
LYMPHOCYTES NFR BLD AUTO: 40 %
MCH RBC QN AUTO: 31.3 PG (ref 26.6–33)
MCHC RBC AUTO-ENTMCNC: 33.6 G/DL (ref 31.5–35.7)
MCV RBC AUTO: 93 FL (ref 79–97)
MONOCYTES # BLD AUTO: 0.6 X10E3/UL (ref 0.1–0.9)
MONOCYTES NFR BLD AUTO: 7 %
NEUTROPHILS # BLD AUTO: 3.5 X10E3/UL (ref 1.4–7)
NEUTROPHILS NFR BLD AUTO: 45 %
PLATELET # BLD AUTO: 221 X10E3/UL (ref 150–450)
POTASSIUM SERPL-SCNC: 4.3 MMOL/L (ref 3.5–5.2)
PROT SERPL-MCNC: 7.1 G/DL (ref 6–8.5)
RBC # BLD AUTO: 4.47 X10E6/UL (ref 3.77–5.28)
SODIUM SERPL-SCNC: 140 MMOL/L (ref 134–144)
TRIGL SERPL-MCNC: 124 MG/DL (ref 0–149)
TSH SERPL DL<=0.005 MIU/L-ACNC: 0.64 UIU/ML (ref 0.45–4.5)
VIT B12 SERPL-MCNC: 286 PG/ML (ref 232–1245)
VLDLC SERPL CALC-MCNC: 22 MG/DL (ref 5–40)
WBC # BLD AUTO: 8 X10E3/UL (ref 3.4–10.8)

## 2023-11-16 NOTE — PROGRESS NOTES
Folic acid low, vitamin B12 borderline low.  I recommend start over-the-counter folic acid 1 mg daily and over-the-counter vitamin B12 500 mcg daily and keep follow-up appointment.  Other labs stable.

## 2023-11-21 ENCOUNTER — OFFICE VISIT (OUTPATIENT)
Dept: FAMILY MEDICINE CLINIC | Facility: CLINIC | Age: 63
End: 2023-11-21
Payer: MEDICAID

## 2023-11-21 VITALS
SYSTOLIC BLOOD PRESSURE: 118 MMHG | RESPIRATION RATE: 15 BRPM | HEIGHT: 67 IN | BODY MASS INDEX: 32.11 KG/M2 | HEART RATE: 73 BPM | OXYGEN SATURATION: 98 % | WEIGHT: 204.6 LBS | DIASTOLIC BLOOD PRESSURE: 72 MMHG

## 2023-11-21 DIAGNOSIS — Z79.899 HIGH RISK MEDICATION USE: ICD-10-CM

## 2023-11-21 DIAGNOSIS — E78.2 MIXED HYPERLIPIDEMIA: ICD-10-CM

## 2023-11-21 DIAGNOSIS — Z23 ENCOUNTER FOR IMMUNIZATION: ICD-10-CM

## 2023-11-21 DIAGNOSIS — Z85.3 HISTORY OF BILATERAL BREAST CANCER: Chronic | ICD-10-CM

## 2023-11-21 DIAGNOSIS — I10 PRIMARY HYPERTENSION: ICD-10-CM

## 2023-11-21 DIAGNOSIS — E53.8 VITAMIN B12 DEFICIENCY: ICD-10-CM

## 2023-11-21 DIAGNOSIS — E55.9 VITAMIN D DEFICIENCY: ICD-10-CM

## 2023-11-21 DIAGNOSIS — R09.81 NASAL CONGESTION: ICD-10-CM

## 2023-11-21 DIAGNOSIS — Z79.4 TYPE 2 DIABETES MELLITUS WITH BOTH EYES AFFECTED BY MILD NONPROLIFERATIVE RETINOPATHY WITHOUT MACULAR EDEMA, WITH LONG-TERM CURRENT USE OF INSULIN: ICD-10-CM

## 2023-11-21 DIAGNOSIS — R61 NIGHT SWEATS: ICD-10-CM

## 2023-11-21 DIAGNOSIS — Z00.00 GENERAL MEDICAL EXAM: Primary | ICD-10-CM

## 2023-11-21 DIAGNOSIS — J30.9 ALLERGIC RHINITIS, UNSPECIFIED SEASONALITY, UNSPECIFIED TRIGGER: ICD-10-CM

## 2023-11-21 DIAGNOSIS — G62.0 CHEMOTHERAPY-INDUCED NEUROPATHY: ICD-10-CM

## 2023-11-21 DIAGNOSIS — N76.0 ACUTE VAGINITIS: ICD-10-CM

## 2023-11-21 DIAGNOSIS — Z12.4 SCREENING FOR CERVICAL CANCER: ICD-10-CM

## 2023-11-21 DIAGNOSIS — E11.3293 TYPE 2 DIABETES MELLITUS WITH BOTH EYES AFFECTED BY MILD NONPROLIFERATIVE RETINOPATHY WITHOUT MACULAR EDEMA, WITH LONG-TERM CURRENT USE OF INSULIN: ICD-10-CM

## 2023-11-21 DIAGNOSIS — F32.5 MAJOR DEPRESSION IN REMISSION: ICD-10-CM

## 2023-11-21 DIAGNOSIS — T45.1X5A CHEMOTHERAPY-INDUCED NEUROPATHY: ICD-10-CM

## 2023-11-21 LAB
POC AMPHETAMINES: NEGATIVE
POC BARBITURATES: NEGATIVE
POC BENZODIAZEPHINES: NEGATIVE
POC COCAINE: NEGATIVE
POC METHADONE: NEGATIVE
POC METHAMPHETAMINE SCREEN URINE: NEGATIVE
POC MICROALBUMIN URINE: 0
POC OPIATES: NEGATIVE
POC OXYCODONE: NEGATIVE
POC PHENCYCLIDINE: NEGATIVE
POC PROPOXYPHENE: NEGATIVE
POC THC: NEGATIVE
POC TRICYCLIC ANTIDEPRESSANTS: NEGATIVE

## 2023-11-21 RX ORDER — AMLODIPINE AND BENAZEPRIL HYDROCHLORIDE 5; 40 MG/1; MG/1
1 CAPSULE ORAL DAILY
Qty: 90 CAPSULE | Refills: 1 | Status: SHIPPED | OUTPATIENT
Start: 2023-11-21

## 2023-11-21 RX ORDER — ATORVASTATIN CALCIUM 80 MG/1
80 TABLET, FILM COATED ORAL DAILY
Qty: 90 TABLET | Refills: 1 | Status: SHIPPED | OUTPATIENT
Start: 2023-11-21 | End: 2024-11-15

## 2023-11-21 RX ORDER — GABAPENTIN 400 MG/1
400 CAPSULE ORAL
Qty: 90 CAPSULE | Refills: 1 | Status: SHIPPED | OUTPATIENT
Start: 2023-11-21

## 2023-11-21 RX ORDER — CARBAMAZEPINE 100 MG/1
100 TABLET, CHEWABLE ORAL 3 TIMES DAILY
Qty: 270 TABLET | Refills: 1 | Status: SHIPPED | OUTPATIENT
Start: 2023-11-21

## 2023-11-21 RX ORDER — FLUTICASONE PROPIONATE 50 MCG
2 SPRAY, SUSPENSION (ML) NASAL DAILY
Qty: 48 G | Refills: 3 | Status: SHIPPED | OUTPATIENT
Start: 2023-11-21

## 2023-11-21 RX ORDER — CLONIDINE HYDROCHLORIDE 0.1 MG/1
0.1 TABLET ORAL
Qty: 90 TABLET | Refills: 1 | Status: SHIPPED | OUTPATIENT
Start: 2023-11-21

## 2023-11-21 RX ORDER — FOLIC ACID 1 MG/1
1 TABLET ORAL DAILY
Qty: 90 TABLET | Refills: 3 | Status: SHIPPED | OUTPATIENT
Start: 2023-11-21

## 2023-11-21 RX ORDER — CETIRIZINE HYDROCHLORIDE 10 MG/1
10 TABLET ORAL DAILY
Qty: 90 TABLET | Refills: 1 | Status: SHIPPED | OUTPATIENT
Start: 2023-11-21

## 2023-11-21 RX ORDER — SEMAGLUTIDE 1.34 MG/ML
1 INJECTION, SOLUTION SUBCUTANEOUS WEEKLY
Qty: 9 ML | Refills: 1 | Status: SHIPPED | OUTPATIENT
Start: 2023-11-21

## 2023-11-21 RX ORDER — CYCLOBENZAPRINE HCL 10 MG
10 TABLET ORAL NIGHTLY
Qty: 90 TABLET | Refills: 1 | Status: SHIPPED | OUTPATIENT
Start: 2023-11-21

## 2023-11-21 RX ORDER — CHOLECALCIFEROL (VITAMIN D3) 125 MCG
500 CAPSULE ORAL DAILY
Qty: 90 TABLET | Refills: 3 | Status: SHIPPED | OUTPATIENT
Start: 2023-11-21

## 2023-11-21 NOTE — ASSESSMENT & PLAN NOTE
Diabetic eye exams up-to-date, will request copy.  I cannot talk her into any type of colon cancer screening including Cologuard.  I did update her pneumonia vaccine and her Tdap today but she declines a flu vaccination.  I encouraged her to get her Shingrix vaccine at the pharmacy.  She wants to get the most recent COVID booster but does not want to take Pfizer, she says she cannot find them Moderna.  I encouraged her to go ahead and boost with what ever is available.

## 2023-11-21 NOTE — PATIENT INSTRUCTIONS
"Healthy Eating  Following a healthy eating pattern may help you to achieve and maintain a healthy body weight, reduce the risk of chronic disease, and live a long and productive life. It is important to follow a healthy eating pattern at an appropriate calorie level for your body. Your nutritional needs should be met primarily through food by choosing a variety of nutrient-rich foods.  What are tips for following this plan?  Reading food labels  Read labels and choose the following:  Reduced or low sodium.  Juices with 100% fruit juice.  Foods with low saturated fats and high polyunsaturated and monounsaturated fats.  Foods with whole grains, such as whole wheat, cracked wheat, brown rice, and wild rice.  Whole grains that are fortified with folic acid. This is recommended for women who are pregnant or who want to become pregnant.  Read labels and avoid the following:  Foods with a lot of added sugars. These include foods that contain brown sugar, corn sweetener, corn syrup, dextrose, fructose, glucose, high-fructose corn syrup, honey, invert sugar, lactose, malt syrup, maltose, molasses, raw sugar, sucrose, trehalose, or turbinado sugar.  Do not eat more than the following amounts of added sugar per day:  6 teaspoons (25 g) for women.  9 teaspoons (38 g) for men.  Foods that contain processed or refined starches and grains.  Refined grain products, such as white flour, degermed cornmeal, white bread, and white rice.  Shopping  Choose nutrient-rich snacks, such as vegetables, whole fruits, and nuts. Avoid high-calorie and high-sugar snacks, such as potato chips, fruit snacks, and candy.  Use oil-based dressings and spreads on foods instead of solid fats such as butter, stick margarine, or cream cheese.  Limit pre-made sauces, mixes, and \"instant\" products such as flavored rice, instant noodles, and ready-made pasta.  Try more plant-protein sources, such as tofu, tempeh, black beans, edamame, lentils, nuts, and " seeds.  Explore eating plans such as the Mediterranean diet or vegetarian diet.  Cooking  Use oil to sauté or stir-parker foods instead of solid fats such as butter, stick margarine, or lard.  Try baking, boiling, grilling, or broiling instead of frying.  Remove the fatty part of meats before cooking.  Steam vegetables in water or broth.  Meal planning    At meals, imagine dividing your plate into fourths:  One-half of your plate is fruits and vegetables.  One-fourth of your plate is whole grains.  One-fourth of your plate is protein, especially lean meats, poultry, eggs, tofu, beans, or nuts.  Include low-fat dairy as part of your daily diet.     Lifestyle  Choose healthy options in all settings, including home, work, school, restaurants, or stores.  Prepare your food safely:  Wash your hands after handling raw meats.  Keep food preparation surfaces clean by regularly washing with hot, soapy water.  Keep raw meats separate from ready-to-eat foods, such as fruits and vegetables.  , meat, poultry, and eggs to the recommended internal temperature.  Store foods at safe temperatures. In general:  Keep cold foods at 40°F (4.4°C) or below.  Keep hot foods at 140°F (60°C) or above.  Keep your freezer at 0°F (-17.8°C) or below.  Foods are no longer safe to eat when they have been between the temperatures of 40°-140°F (4.4-60°C) for more than 2 hours.  What foods should I eat?  Fruits  Aim to eat 2 cup-equivalents of fresh, canned (in natural juice), or frozen fruits each day. Examples of 1 cup-equivalent of fruit include 1 small apple, 8 large strawberries, 1 cup canned fruit, ½ cup dried fruit, or 1 cup 100% juice.  Vegetables  Aim to eat 2½-3 cup-equivalents of fresh and frozen vegetables each day, including different varieties and colors. Examples of 1 cup-equivalent of vegetables include 2 medium carrots, 2 cups raw, leafy greens, 1 cup chopped vegetable (raw or cooked), or 1 medium baked potato.  Grains  Aim  to eat 6 ounce-equivalents of whole grains each day. Examples of 1 ounce-equivalent of grains include 1 slice of bread, 1 cup ready-to-eat cereal, 3 cups popcorn, or ½ cup cooked rice, pasta, or cereal.  Meats and other proteins  Aim to eat 5-6 ounce-equivalents of protein each day. Examples of 1 ounce-equivalent of protein include 1 egg, 1/2 cup nuts or seeds, or 1 tablespoon (16 g) peanut butter. A cut of meat or fish that is the size of a deck of cards is about 3-4 ounce-equivalents.  Of the protein you eat each week, try to have at least 8 ounces come from seafood. This includes salmon, trout, herring, and anchovies.  Dairy  Aim to eat 3 cup-equivalents of fat-free or low-fat dairy each day. Examples of 1 cup-equivalent of dairy include 1 cup (240 mL) milk, 8 ounces (250 g) yogurt, 1½ ounces (44 g) natural cheese, or 1 cup (240 mL) fortified soy milk.  Fats and oils  Aim for about 5 teaspoons (21 g) per day. Choose monounsaturated fats, such as canola and olive oils, avocados, peanut butter, and most nuts, or polyunsaturated fats, such as sunflower, corn, and soybean oils, walnuts, pine nuts, sesame seeds, sunflower seeds, and flaxseed.  Beverages  Aim for six 8-oz glasses of water per day. Limit coffee to three to five 8-oz cups per day.  Limit caffeinated beverages that have added calories, such as soda and energy drinks.  Limit alcohol intake to no more than 1 drink a day for nonpregnant women and 2 drinks a day for men. One drink equals 12 oz of beer (355 mL), 5 oz of wine (148 mL), or 1½ oz of hard liquor (44 mL).  Seasoning and other foods  Avoid adding excess amounts of salt to your foods. Try flavoring foods with herbs and spices instead of salt.  Avoid adding sugar to foods.  Try using oil-based dressings, sauces, and spreads instead of solid fats.  This information is based on general [place] nutrition guidelines. For more information, visit choosemyplate.gov. Exact amounts may vary based on your  nutrition needs.  Summary  A healthy eating plan may help you to maintain a healthy weight, reduce the risk of chronic diseases, and stay active throughout your life.  Plan your meals. Make sure you eat the right portions of a variety of nutrient-rich foods.  Try baking, boiling, grilling, or broiling instead of frying.  Choose healthy options in all settings, including home, work, school, restaurants, or stores.  This information is not intended to replace advice given to you by your health care provider. Make sure you discuss any questions you have with your health care provider.  Document Revised: 04/01/2019 Document Reviewed: 04/01/2019  Elsevier Patient Education © 2021 [employer]

## 2023-11-21 NOTE — ASSESSMENT & PLAN NOTE
Controlled substance was refilled today. Informed consent and treatment agreement signed previous visit. Educational materials provided and discussed on appropriate use, disposal and storage of medications. Her UNA was appropriate. Written information regarding appropriate use, storage and disposal of medication was given and discussed. Point of care UDS was performed today.

## 2023-11-21 NOTE — ASSESSMENT & PLAN NOTE
Recent A1c was good but patient has been out of her Ozempic for over a month.  There is a known national supply issue specially with the 2 mg dose, she should be able to get the 1 mg dose.  She has not tried reaching out to her endocrinologist and does not have an appointment until December 6.  I went ahead and sent in Ozempic 1 mg since that supply should be available but I recommend take half a dose just that first week.

## 2023-11-21 NOTE — PROGRESS NOTES
Annual Physical-Preventive Visit     Patient Name: Tara Perez  : 1960   MRN: [id number]     Chief Complaint:    Chief Complaint   Patient presents with    Annual Exam    Hyperlipidemia    Hypertension    Peripheral Neuropathy    Allergies    Diabetes       History of Present Illness: Tara Perez is a 62 y.o. female who is here today for their annual health maintenance and physical.  She is due in medication refills.  She sees her eye doctor every 6 months and is being followed for diabetic retinopathy.  Her diabetes is followed by endocrinology but she has not been able to get her Ozempic in over a month because of supply issues so has been without and says her sugar is spiking.  She says her head stays congested and her ears feel stopped up and that the Zyrtec alone is not working.  She complains of some vaginal discharge just in the past week or so that she has not had in years.    Subjective      Review of Systems:   Review of Systems   Constitutional:  Negative for fatigue and fever.   HENT:  Positive for congestion and hearing loss (ears feel stopped up- zyrtec not working).    Eyes:  Negative for visual disturbance.   Respiratory:  Negative for shortness of breath.    Cardiovascular:  Negative for chest pain, palpitations and leg swelling.   Gastrointestinal:  Negative for abdominal pain, blood in stool, constipation and diarrhea.   Endocrine:        Has not been able to get Ozempic for over a month (2 mg dose) and has not reached out to her endocrinologist.  She says her sugar has been shooting up.  A1C  was 5.9.  Her next appointment with endocrinology is .   Genitourinary:  Negative for difficulty urinating.   Musculoskeletal:  Negative for arthralgias and myalgias.   Skin:  Negative for rash.   Allergic/Immunologic: Negative for immunocompromised state.   Psychiatric/Behavioral:  Negative for dysphoric mood. The patient is not nervous/anxious.         Past  History:  Medical History: has a past medical history of Mild nonproliferative diabetic retinopathy of both eyes without macular edema associated with type 2 diabetes mellitus (10/19/2022) and Type 2 diabetes mellitus.   Surgical History: has a past surgical history that includes Appendectomy; Tonsillectomy and adenoidectomy; and Mastectomy.   Family History: family history includes Diabetes in her brother, father, and sister.   Social History: reports that she quit smoking about 31 years ago. Her smoking use included cigarettes. She started smoking about 48 years ago. She has a 17.00 pack-year smoking history. She has never used smokeless tobacco. She reports that she does not drink alcohol and does not use drugs.    Health Maintenance   Topic Date Due    PAP SMEAR  Never done    ANNUAL PHYSICAL  05/17/2023    DIABETIC FOOT EXAM  10/19/2023    DIABETIC EYE EXAM  10/24/2023    INFLUENZA VACCINE  03/31/2024 (Originally 8/1/2023)    ZOSTER VACCINE (1 of 2) 11/21/2024 (Originally 12/10/2011)    COLORECTAL CANCER SCREENING  11/21/2024 (Originally 1960)    COVID-19 Vaccine (6 - 2023-24 season) 12/12/2112 (Originally 9/1/2023)    HEMOGLOBIN A1C  05/14/2024    LIPID PANEL  11/14/2024    URINE MICROALBUMIN  11/21/2024    BMI FOLLOWUP  11/21/2024    TDAP/TD VACCINES (2 - Td or Tdap) 11/21/2033    HEPATITIS C SCREENING  Completed    Pneumococcal Vaccine 0-64  Completed        Immunization History   Administered Date(s) Administered    COVID-19 (MODERNA) 1st,2nd,3rd Dose Monovalent 04/20/2021, 05/18/2021, 11/03/2021, 04/01/2022    COVID-19 (MODERNA) BIVALENT 12+YRS 10/19/2022    Pneumococcal Conjugate 20-Valent (PCV20) 11/21/2023    Tdap 11/21/2023       Medications:     Current Outpatient Medications:     amLODIPine-benazepril (LOTREL) 5-40 MG per capsule, Take 1 capsule by mouth Daily., Disp: 90 capsule, Rfl: 1    atorvastatin (LIPITOR) 80 MG tablet, Take 1 tablet by mouth Daily for 360 days., Disp: 90 tablet, Rfl: 1     carBAMazepine (TEGretol) 100 MG chewable tablet, Chew 1 tablet 3 (Three) Times a Day., Disp: 270 tablet, Rfl: 1    cetirizine (zyrTEC) 10 MG tablet, Take 1 tablet by mouth Daily., Disp: 90 tablet, Rfl: 1    cloNIDine (CATAPRES) 0.1 MG tablet, Take 1 tablet by mouth every night at bedtime., Disp: 90 tablet, Rfl: 1    Continuous Blood Gluc  (Dexcom G6 ) device, 1 each Take As Directed., Disp: 1 each, Rfl: 0    Continuous Blood Gluc Sensor (Dexcom G6 Sensor), Every 10 (Ten) Days., Disp: 9 each, Rfl: 3    Continuous Blood Gluc Transmit (Dexcom G6 Transmitter) misc, 1 each Every 3 (Three) Months., Disp: 1 each, Rfl: 3    cyclobenzaprine (FLEXERIL) 10 MG tablet, Take 1 tablet by mouth Every Night., Disp: 90 tablet, Rfl: 1    Easy Touch Lancets 30G/Twist misc, Use as directed up to 4 times daily, Disp: 100 each, Rfl: 11    ezetimibe (ZETIA) 10 MG tablet, Take 1 tablet by mouth every night at bedtime., Disp: 90 tablet, Rfl: 3    gabapentin (NEURONTIN) 400 MG capsule, Take 1 capsule by mouth every night at bedtime., Disp: 90 capsule, Rfl: 1    insulin aspart prot & aspart (NovoLOG 70/30 FlexPen ReliOn) (70-30) 100 UNIT/ML suspension pen-injector injection, Inject 25u under the skin before breakfast and 50u under the skin before dinner, titrate dose as directed for max daily dose 100u, Disp: 30 mL, Rfl: 5    Insulin Pen Needle (B-D ULTRAFINE III SHORT PEN) 31G X 8 MM misc, Use as directed twice daily, Disp: 90 each, Rfl: 11    OneTouch Ultra test strip, Use as instructed to test twice daily, Disp: 100 each, Rfl: 11    fluticasone (FLONASE) 50 MCG/ACT nasal spray, 2 sprays into the nostril(s) as directed by provider Daily., Disp: 48 g, Rfl: 3    folic acid (FOLVITE) 1 MG tablet, Take 1 tablet by mouth Daily., Disp: 90 tablet, Rfl: 3    Semaglutide, 1 MG/DOSE, (Ozempic, 1 MG/DOSE,) 4 MG/3ML solution pen-injector, Inject 1 mg under the skin into the appropriate area as directed 1 (One) Time Per Week., Disp: 9  "mL, Rfl: 1    vitamin B-12 (CYANOCOBALAMIN) 500 MCG tablet, Take 1 tablet by mouth Daily., Disp: 90 tablet, Rfl: 3    Allergies:   Allergies   Allergen Reactions    Penicillins Rash       Depression: PHQ-2 Depression Screening  Little interest or pleasure in doing things?     Feeling down, depressed, or hopeless?     PHQ-2 Total Score        Predictive Model Details   No score data available for Risk of Fall         Objective     Physical Exam:  Vital Signs:   Vitals:    11/21/23 1410   BP: 118/72   BP Location: Right arm   Patient Position: Sitting   Cuff Size: Adult   Pulse: 73   Resp: 15   SpO2: 98%   Weight: 92.8 kg (204 lb 9.6 oz)   Height: 170.2 cm (67\")     Body mass index is 32.04 kg/m².           Physical Exam  Exam conducted with a chaperone present.   Constitutional:       Appearance: Normal appearance. She is obese.   Cardiovascular:      Rate and Rhythm: Normal rate and regular rhythm.      Pulses:           Dorsalis pedis pulses are detected w/ Doppler on the right side and detected w/ Doppler on the left side.        Posterior tibial pulses are detected w/ Doppler on the right side and detected w/ Doppler on the left side.   Pulmonary:      Effort: Pulmonary effort is normal.      Breath sounds: Normal breath sounds.   Genitourinary:     General: Normal vulva.      Vagina: Vaginal discharge present.      Cervix: Normal.      Uterus: Normal.       Adnexa: Right adnexa normal and left adnexa normal.      Comments: Obesity limits exam.  Musculoskeletal:      Right foot: No deformity.      Left foot: No deformity.   Feet:      Right foot:      Protective Sensation: 10 sites tested.  0 sites sensed.      Skin integrity: Skin integrity normal.      Toenail Condition: Right toenails are normal.      Left foot:      Protective Sensation: 10 sites tested.  0 sites sensed.      Skin integrity: Skin integrity normal.      Toenail Condition: Left toenails are normal.      Comments: Diabetic Foot Exam Performed " and Monofilament Test Performed     Neurological:      General: No focal deficit present.      Mental Status: She is alert.   Psychiatric:         Thought Content: Thought content normal.         Judgment: Judgment normal.         Procedures    Assessment / Plan      Assessment/Plan:   Diagnoses and all orders for this visit:    1. General medical exam (Primary)  Assessment & Plan:  Diabetic eye exams up-to-date, will request copy.  I cannot talk her into any type of colon cancer screening including Cologuard.  I did update her pneumonia vaccine and her Tdap today but she declines a flu vaccination.  I encouraged her to get her Shingrix vaccine at the pharmacy.  She wants to get the most recent COVID booster but does not want to take Pfizer, she says she cannot find them Moderna.  I encouraged her to go ahead and boost with what ever is available.      2. Vitamin B12 deficiency  Assessment & Plan:  Start vitamin B12 and folic acid.    Orders:  -     folic acid (FOLVITE) 1 MG tablet; Take 1 tablet by mouth Daily.  Dispense: 90 tablet; Refill: 3  -     vitamin B-12 (CYANOCOBALAMIN) 500 MCG tablet; Take 1 tablet by mouth Daily.  Dispense: 90 tablet; Refill: 3    3. High risk medication use  -     POC Urine Drug Screen, Triage    4. Nasal congestion  Assessment & Plan:  Add Flonase nasal spray.    Orders:  -     fluticasone (FLONASE) 50 MCG/ACT nasal spray; 2 sprays into the nostril(s) as directed by provider Daily.  Dispense: 48 g; Refill: 3    5. Primary hypertension  Assessment & Plan:  Hypertension is improving with treatment.  Continue current treatment regimen.  Blood pressure will be reassessed at the next regular appointment.    Orders:  -     amLODIPine-benazepril (LOTREL) 5-40 MG per capsule; Take 1 capsule by mouth Daily.  Dispense: 90 capsule; Refill: 1    6. Major depression in remission  Assessment & Plan:  Continues to be stable without medication.      7. Vitamin D deficiency  Assessment &  Plan:  Continue vitamin D supplement.      8. Mixed hyperlipidemia  Assessment & Plan:  Lipid abnormalities are improving with treatment.  Pharmacotherapy as ordered.  Lipids will be reassessed in 6 months.    Orders:  -     atorvastatin (LIPITOR) 80 MG tablet; Take 1 tablet by mouth Daily for 360 days.  Dispense: 90 tablet; Refill: 1    9. Type 2 diabetes mellitus with both eyes affected by mild nonproliferative retinopathy without macular edema, with long-term current use of insulin  Assessment & Plan:  Recent A1c was good but patient has been out of her Ozempic for over a month.  There is a known national supply issue specially with the 2 mg dose, she should be able to get the 1 mg dose.  She has not tried reaching out to her endocrinologist and does not have an appointment until December 6.  I went ahead and sent in Ozempic 1 mg since that supply should be available but I recommend take half a dose just that first week.    Orders:  -     POC Microalbumin  -     Semaglutide, 1 MG/DOSE, (Ozempic, 1 MG/DOSE,) 4 MG/3ML solution pen-injector; Inject 1 mg under the skin into the appropriate area as directed 1 (One) Time Per Week.  Dispense: 9 mL; Refill: 1    10. Allergic rhinitis, unspecified seasonality, unspecified trigger  Assessment & Plan:  Add Flonase nasal spray.    Orders:  -     cetirizine (zyrTEC) 10 MG tablet; Take 1 tablet by mouth Daily.  Dispense: 90 tablet; Refill: 1    11. Chemotherapy-induced neuropathy  Assessment & Plan:  Controlled substance was refilled today. Informed consent and treatment agreement signed previous visit. Educational materials provided and discussed on appropriate use, disposal and storage of medications. Her UNA was appropriate. Written information regarding appropriate use, storage and disposal of medication was given and discussed. Point of care UDS was performed today.     Orders:  -     carBAMazepine (TEGretol) 100 MG chewable tablet; Chew 1 tablet 3 (Three) Times a Day.   Dispense: 270 tablet; Refill: 1  -     cyclobenzaprine (FLEXERIL) 10 MG tablet; Take 1 tablet by mouth Every Night.  Dispense: 90 tablet; Refill: 1  -     gabapentin (NEURONTIN) 400 MG capsule; Take 1 capsule by mouth every night at bedtime.  Dispense: 90 capsule; Refill: 1    12. Night sweats  Assessment & Plan:  Continue clonidine nightly.    Orders:  -     cloNIDine (CATAPRES) 0.1 MG tablet; Take 1 tablet by mouth every night at bedtime.  Dispense: 90 tablet; Refill: 1    13. Acute vaginitis  Assessment & Plan:  We will send vaginal cultures, looks like she might have BV.    Orders:  -     NuSwab VG+ - Swab, Vagina    14. Encounter for immunization  -     Pneumococcal Conjugate Vaccine 20-Valent All  -     Tdap Vaccine Greater Than or Equal To 8yo IM    15. Screening for cervical cancer  -     IGP, Aptima HPV, Rfx 16 / 18,45    16. History of bilateral breast cancer  Assessment & Plan:  Followed by oncology, status post bilateral mastectomy.             Current Outpatient Medications:     amLODIPine-benazepril (LOTREL) 5-40 MG per capsule, Take 1 capsule by mouth Daily., Disp: 90 capsule, Rfl: 1    atorvastatin (LIPITOR) 80 MG tablet, Take 1 tablet by mouth Daily for 360 days., Disp: 90 tablet, Rfl: 1    carBAMazepine (TEGretol) 100 MG chewable tablet, Chew 1 tablet 3 (Three) Times a Day., Disp: 270 tablet, Rfl: 1    cetirizine (zyrTEC) 10 MG tablet, Take 1 tablet by mouth Daily., Disp: 90 tablet, Rfl: 1    cloNIDine (CATAPRES) 0.1 MG tablet, Take 1 tablet by mouth every night at bedtime., Disp: 90 tablet, Rfl: 1    Continuous Blood Gluc  (Dexcom G6 ) device, 1 each Take As Directed., Disp: 1 each, Rfl: 0    Continuous Blood Gluc Sensor (Dexcom G6 Sensor), Every 10 (Ten) Days., Disp: 9 each, Rfl: 3    Continuous Blood Gluc Transmit (Dexcom G6 Transmitter) misc, 1 each Every 3 (Three) Months., Disp: 1 each, Rfl: 3    cyclobenzaprine (FLEXERIL) 10 MG tablet, Take 1 tablet by mouth Every Night.,  Disp: 90 tablet, Rfl: 1    Easy Touch Lancets 30G/Twist misc, Use as directed up to 4 times daily, Disp: 100 each, Rfl: 11    ezetimibe (ZETIA) 10 MG tablet, Take 1 tablet by mouth every night at bedtime., Disp: 90 tablet, Rfl: 3    gabapentin (NEURONTIN) 400 MG capsule, Take 1 capsule by mouth every night at bedtime., Disp: 90 capsule, Rfl: 1    insulin aspart prot & aspart (NovoLOG 70/30 FlexPen ReliOn) (70-30) 100 UNIT/ML suspension pen-injector injection, Inject 25u under the skin before breakfast and 50u under the skin before dinner, titrate dose as directed for max daily dose 100u, Disp: 30 mL, Rfl: 5    Insulin Pen Needle (B-D ULTRAFINE III SHORT PEN) 31G X 8 MM misc, Use as directed twice daily, Disp: 90 each, Rfl: 11    OneTouch Ultra test strip, Use as instructed to test twice daily, Disp: 100 each, Rfl: 11    fluticasone (FLONASE) 50 MCG/ACT nasal spray, 2 sprays into the nostril(s) as directed by provider Daily., Disp: 48 g, Rfl: 3    folic acid (FOLVITE) 1 MG tablet, Take 1 tablet by mouth Daily., Disp: 90 tablet, Rfl: 3    Semaglutide, 1 MG/DOSE, (Ozempic, 1 MG/DOSE,) 4 MG/3ML solution pen-injector, Inject 1 mg under the skin into the appropriate area as directed 1 (One) Time Per Week., Disp: 9 mL, Rfl: 1    vitamin B-12 (CYANOCOBALAMIN) 500 MCG tablet, Take 1 tablet by mouth Daily., Disp: 90 tablet, Rfl: 3    Follow Up:   Return in about 6 months (around 5/21/2024) for 30 minute med recheck with labs one week prior.    Healthcare Maintenance:   Counseling provided on healthy diet and exercise.  Tara Perez voices understanding and acceptance of this advice.  AVS with preventive healthcare tips printed for patient.     Rayne Santiago PA-C  [facility]

## 2023-11-24 LAB
A VAGINAE DNA VAG QL NAA+PROBE: NORMAL SCORE
BVAB2 DNA VAG QL NAA+PROBE: NORMAL SCORE
C ALBICANS DNA VAG QL NAA+PROBE: NEGATIVE
C GLABRATA DNA VAG QL NAA+PROBE: NEGATIVE
C TRACH DNA VAG QL NAA+PROBE: NEGATIVE
MEGA1 DNA VAG QL NAA+PROBE: NORMAL SCORE
N GONORRHOEA DNA VAG QL NAA+PROBE: NEGATIVE
T VAGINALIS DNA VAG QL NAA+PROBE: NEGATIVE

## 2023-11-27 LAB
CYTOLOGIST CVX/VAG CYTO: NORMAL
CYTOLOGY CVX/VAG DOC CYTO: NORMAL
CYTOLOGY CVX/VAG DOC THIN PREP: NORMAL
DX ICD CODE: NORMAL
HIV 1 & 2 AB SER-IMP: NORMAL
HPV GENOTYPE REFLEX: NORMAL
HPV I/H RISK 4 DNA CVX QL PROBE+SIG AMP: NEGATIVE
OTHER STN SPEC: NORMAL
STAT OF ADQ CVX/VAG CYTO-IMP: NORMAL

## 2023-12-06 ENCOUNTER — PRIOR AUTHORIZATION (OUTPATIENT)
Dept: ENDOCRINOLOGY | Facility: CLINIC | Age: 63
End: 2023-12-06
Payer: MEDICAID

## 2023-12-06 ENCOUNTER — OFFICE VISIT (OUTPATIENT)
Dept: ENDOCRINOLOGY | Facility: CLINIC | Age: 63
End: 2023-12-06
Payer: MEDICAID

## 2023-12-06 VITALS
HEART RATE: 79 BPM | OXYGEN SATURATION: 98 % | WEIGHT: 204 LBS | BODY MASS INDEX: 32.02 KG/M2 | HEIGHT: 67 IN | DIASTOLIC BLOOD PRESSURE: 70 MMHG | SYSTOLIC BLOOD PRESSURE: 120 MMHG

## 2023-12-06 DIAGNOSIS — E11.3293 TYPE 2 DIABETES MELLITUS WITH BOTH EYES AFFECTED BY MILD NONPROLIFERATIVE RETINOPATHY WITHOUT MACULAR EDEMA, WITH LONG-TERM CURRENT USE OF INSULIN: Primary | ICD-10-CM

## 2023-12-06 DIAGNOSIS — Z79.4 TYPE 2 DIABETES MELLITUS WITH BOTH EYES AFFECTED BY MILD NONPROLIFERATIVE RETINOPATHY WITHOUT MACULAR EDEMA, WITH LONG-TERM CURRENT USE OF INSULIN: Primary | ICD-10-CM

## 2023-12-06 NOTE — PROGRESS NOTES
Chief complaint/Reason for consult: T2DM    HPI: Ms. Perez is a 62-year-old female with type 2 diabetes, history of breast cancer, hypertension, depression and mixed hyperlipidemia who comes for follow-up of type 2 diabetes.  She was last seen in this office on 9/6/2023 and reports since that time no significant changes in her health other than higher glucoses since pharmacy has been out of Ozempic.        # [id number], uncontrolled due to hyperglycemia with complications  # Diabetic retinopathy   - Diagnosed: around 2014   - Current regimen includes: 70/30 25 units with breakfast and 50 units with supper   - Cannot find Ozempic at pharmacy so has been without it for about 6 weeks and blood sugars are much higher   - Tried metformin in the past but had intolerable GI issues   - Had a lot of yeast infections so SGLT2 inhibitors were stopped, not interested in trying it again  - Compliance with medications is good, rarely misses any doses although she is late with her insulin injections several times per week   - Eats one main meal per day (dinner), a smaller breakfast and has some snacks during the day   - She continues to have regular soda at dinner with her medications in the evening although reports only drinking half of it now   - Access to food is an issue due to her fixed income   - HbA1c: 5.9% on 11/14/2023  - Using Dexcom G6      CGM Download  -Dates reviewed: 11/23/2023-12/6/2023  -Data: 100% wear time, average glucose 201 mg/dL, standard deviation 46 mg/dL, GMI 8.1%, 15% very high, 47% high, 38% time in range, 0% low and 0% very low  -Interpretation: Mild persistent hyperglycemia with worsened glucoses after supper     - Hypoglycemia occurs rarely   - Hyperglycemia occurs worse in the evening just after dinner  - 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), stable on gabapentin  - Patient denies gastroparesis   - Patient reports rotating injection sites   - Patient without known ASCVD   -  "taking ACEi; blood pressure today 120/70     DM Health Maintenance:  Ophtho: done 11/2023 and reports nonproliferative retinopathy that is stable (see media tab)   Monofilament / Foot exam: abnormal, completed 5/24/2023, has diabetic shoes which has helped a lot  Lipids/Statin: taking a statin and Zetia with last FLP showing LDL 87 on 11/14/2023  RACHAEL: Negative on 11/21/2023  TSH: 0.644 done 11/14/2023  Aspirin: not taking      # Mixed hyperlipidemia  - Compliant with atorvastatin 80 mg daily and ezetimibe 10 mg daily, no reported side effects on these medications  - FLP showing total cholesterol 164, HDL 55, LDL 87 and triglycerides 124 done 11/14/2023    Past medical history, past surgical history, family history and social history reviewed within this encounter.     Review of Systems   Constitutional:  Negative for activity change and unexpected weight change.   HENT:  Negative for trouble swallowing and voice change.    Eyes:  Negative for visual disturbance.   Respiratory:  Negative for shortness of breath.    Cardiovascular:  Negative for chest pain.   Gastrointestinal:  Negative for abdominal pain.   Endocrine: Negative for cold intolerance and heat intolerance.   Genitourinary:  Negative for dysuria.   Musculoskeletal:  Negative for gait problem.   Skin:  Negative for rash.   Neurological:  Positive for numbness.   Psychiatric/Behavioral:  Negative for agitation and confusion.         /70   Pulse 79   Ht 170.2 cm (67\")   Wt 92.5 kg (204 lb)   SpO2 98%   BMI 31.95 kg/m²      Physical Exam  Vitals reviewed.   Constitutional:       General: She is not in acute distress.     Appearance: She is obese.   HENT:      Head: Normocephalic.      Nose: Nose normal.   Eyes:      Conjunctiva/sclera: Conjunctivae normal.   Cardiovascular:      Rate and Rhythm: Normal rate.   Pulmonary:      Effort: Pulmonary effort is normal.   Abdominal:      Tenderness: There is no guarding.   Musculoskeletal:         General: " Normal range of motion.   Skin:     General: Skin is warm and dry.   Neurological:      Mental Status: She is alert and oriented to person, place, and time.   Psychiatric:         Mood and Affect: Mood normal.         Behavior: Behavior normal.         Thought Content: Thought content normal.          Labs and images reviewed as noted in the HPI    Assessment and plan:    Diagnoses and all orders for this visit:    1. Type 2 diabetes mellitus with both eyes affected by mild nonproliferative retinopathy without macular edema, with long-term current use of insulin (Primary)  Assessment & Plan:  -Diabetes is uncontrolled due to hyperglycemia  -Complications include known diabetic retinopathy  -Patient remains at risk for complications due to persistent hyperglycemia; counseled that long term hyperglycemia can cause worsening neuropathy, kidney damage, eye damage, and cardiovascular disease   -Given Ozempic is out of stock recommend switching to Mounjaro, 7.5 mg weekly, if tolerated after a month we can increase the dose  -Continue 70/30 25 units with breakfast and 50 units with supper, if unable to find Mounjaro can increase dose by 10 units for each meal until she is able to find it  -Continue Dexcom CGM  -Continue ACEi; blood pressure today 120/70     DM Health Maintenance:  Ophtho: done 11/2023 and reports nonproliferative retinopathy that is stable (see media tab)   Monofilament / Foot exam: abnormal, completed 5/24/2023, has diabetic shoes which has helped a lot  Lipids/Statin: Continue statin and Zetia with last FLP showing LDL 87 on 11/14/2023  RACHAEL: Negative on 11/21/2023  TSH: 0.644 done 11/14/2023  Aspirin: not taking    Orders:  -     Tirzepatide (Mounjaro) 7.5 MG/0.5ML solution pen-injector; Inject 0.5 mL under the skin into the appropriate area as directed 1 (One) Time Per Week.  Dispense: 2 mL; Refill: 0         Return in about 3 months (around 3/6/2024) for T2DM.     Electronically signed by: Humphrey NICE  Rosenstein, MD

## 2023-12-06 NOTE — ASSESSMENT & PLAN NOTE
-Diabetes is uncontrolled due to hyperglycemia  -Complications include known diabetic retinopathy  -Patient remains at risk for complications due to persistent hyperglycemia; counseled that long term hyperglycemia can cause worsening neuropathy, kidney damage, eye damage, and cardiovascular disease   -Given Ozempic is out of stock recommend switching to Mounjaro, 7.5 mg weekly, if tolerated after a month we can increase the dose  -Continue 70/30 25 units with breakfast and 50 units with supper, if unable to find Mounjaro can increase dose by 10 units for each meal until she is able to find it  -Continue Dexcom CGM  -Continue ACEi; blood pressure today 120/70     DM Health Maintenance:  Ophtho: done 11/2023 and reports nonproliferative retinopathy that is stable (see media tab)   Monofilament / Foot exam: abnormal, completed 5/24/2023, has diabetic shoes which has helped a lot  Lipids/Statin: Continue statin and Zetia with last FLP showing LDL 87 on 11/14/2023  RACHAEL: Negative on 11/21/2023  TSH: 0.644 done 11/14/2023  Aspirin: not taking

## 2023-12-07 NOTE — TELEPHONE ENCOUNTER
The prior authorization request has been cancelled. For questions or additional information, please call [phone].  Drug  Mounjaro 7.5MG/0.5ML pen-injectors  Form  [employer] [place] Medicaid ePA Form 2017 NCPDP    CALLED [employer] THEY STATE NO COVERAGE

## 2023-12-12 ENCOUNTER — TELEPHONE (OUTPATIENT)
Dept: ENDOCRINOLOGY | Facility: CLINIC | Age: 63
End: 2023-12-12
Payer: MEDICAID

## 2023-12-12 NOTE — TELEPHONE ENCOUNTER
Patient called office, stated that Dr. Rosenstein called her in [facility] to her pharmacy due to not having ozempic in stock. She stated that pharmacy ended up giving her one pen of ozempic and she took that last night. This morning she had a delivery for mounjaro at her door. She is confused as to what she should take. She would like a call back. Thank you

## 2023-12-18 DIAGNOSIS — T45.1X5A CHEMOTHERAPY-INDUCED NEUROPATHY: ICD-10-CM

## 2023-12-18 DIAGNOSIS — G62.0 CHEMOTHERAPY-INDUCED NEUROPATHY: ICD-10-CM

## 2023-12-18 RX ORDER — CYCLOBENZAPRINE HCL 10 MG
10 TABLET ORAL EVERY EVENING
Qty: 30 TABLET | Refills: 1 | OUTPATIENT
Start: 2023-12-18

## 2023-12-18 NOTE — TELEPHONE ENCOUNTER
Refill request for Cyclobenzaprine 10 mg. However on 11/21/23 there was a 90 day supply sent rejecting RX request.

## 2023-12-26 DIAGNOSIS — T45.1X5A CHEMOTHERAPY-INDUCED NEUROPATHY: ICD-10-CM

## 2023-12-26 DIAGNOSIS — G62.0 CHEMOTHERAPY-INDUCED NEUROPATHY: ICD-10-CM

## 2023-12-26 RX ORDER — CYCLOBENZAPRINE HCL 10 MG
10 TABLET ORAL NIGHTLY
Qty: 90 TABLET | Refills: 1 | Status: SHIPPED | OUTPATIENT
Start: 2023-12-26

## 2023-12-27 ENCOUNTER — TELEPHONE (OUTPATIENT)
Dept: FAMILY MEDICINE CLINIC | Facility: CLINIC | Age: 63
End: 2023-12-27
Payer: MEDICAID

## 2023-12-28 ENCOUNTER — TELEPHONE (OUTPATIENT)
Dept: ENDOCRINOLOGY | Facility: CLINIC | Age: 63
End: 2023-12-28
Payer: MEDICAID

## 2023-12-28 DIAGNOSIS — Z79.4 TYPE 2 DIABETES MELLITUS WITH HYPERGLYCEMIA, WITH LONG-TERM CURRENT USE OF INSULIN: ICD-10-CM

## 2023-12-28 DIAGNOSIS — E11.65 TYPE 2 DIABETES MELLITUS WITH HYPERGLYCEMIA, WITH LONG-TERM CURRENT USE OF INSULIN: ICD-10-CM

## 2023-12-28 RX ORDER — PEN NEEDLE, DIABETIC 31 GX5/16"
NEEDLE, DISPOSABLE MISCELLANEOUS
Qty: 200 EACH | Refills: 3 | Status: SHIPPED | OUTPATIENT
Start: 2023-12-28

## 2023-12-28 NOTE — TELEPHONE ENCOUNTER
Caller: Tara Perez    Relationship: Self    Best call back number: [phone]     Requested Prescriptions:Insulin Pen Needle (B-D ULTRAFINE III SHORT PEN) 31G X 8 MM misc    Requested Prescriptions      No prescriptions requested or ordered in this encounter        Pharmacy where request should be sent:      Last office visit with prescribing clinician: 12/6/2023   Last telemedicine visit with prescribing clinician: Visit date not found   Next office visit with prescribing clinician: 3/6/2024     Additional details provided by patient:     Does the patient have less than a 3 day supply:  [] Yes  [] No    Would you like a call back once the refill request has been completed: [] Yes [] No    If the office needs to give you a call back, can they leave a voicemail: [] Yes [] No    Radha Rico Rep   12/28/23 14:42 EST

## 2024-01-17 ENCOUNTER — TELEPHONE (OUTPATIENT)
Dept: ENDOCRINOLOGY | Facility: CLINIC | Age: 64
End: 2024-01-17
Payer: MEDICAID

## 2024-01-17 DIAGNOSIS — I10 PRIMARY HYPERTENSION: ICD-10-CM

## 2024-01-17 RX ORDER — AMLODIPINE AND BENAZEPRIL HYDROCHLORIDE 5; 40 MG/1; MG/1
1 CAPSULE ORAL DAILY
Qty: 90 CAPSULE | Refills: 1 | OUTPATIENT
Start: 2024-01-17

## 2024-01-17 NOTE — TELEPHONE ENCOUNTER
Caller: Tara Perez    Relationship: Self    Best call back number: [phone] (home)       Requested Prescriptions: MOUNJARO   Requested Prescriptions      No prescriptions requested or ordered in this encounter        Pharmacy where request should be sent:  [facility]    Last office visit with prescribing clinician: 12/6/2023   Last telemedicine visit with prescribing clinician: Visit date not found   Next office visit with prescribing clinician: 3/6/2024   Additional details provided by patient:     Does the patient have less than a 3 day supply:  [] Yes  [x] No    Would you like a call back once the refill request has been completed: [] Yes [x] No    If the office needs to give you a call back, can they leave a voicemail: [] Yes [] No    Radha Rodrigues Rep   01/17/24 10:38 EST

## 2024-01-17 NOTE — TELEPHONE ENCOUNTER
Caller: Tara Perez    Relationship: Self    Best call back number: [phone]     Requested Prescriptions:   Requested Prescriptions     Pending Prescriptions Disp Refills    amLODIPine-benazepril (LOTREL) 5-40 MG per capsule 90 capsule 1     Sig: Take 1 capsule by mouth Daily.        Pharmacy where request should be sent: [facility] [id number] [facility], [place] - [address] [facility] AT [facility] [address] - [phone] [facility] [phone] FX     Last office visit with prescribing clinician: 11/21/2023   Last telemedicine visit with prescribing clinician: Visit date not found   Next office visit with prescribing clinician: Visit date not found     Additional details provided by patient: 4 DAYS REMAINING.    Does the patient have less than a 3 day supply:  [] Yes  [x] No    Would you like a call back once the refill request has been completed: [] Yes [x] No    If the office needs to give you a call back, can they leave a voicemail: [] Yes [x] No    Radha Gaxiola Rep   01/17/24 10:23 EST

## 2024-01-18 ENCOUNTER — PRIOR AUTHORIZATION (OUTPATIENT)
Dept: ENDOCRINOLOGY | Facility: CLINIC | Age: 64
End: 2024-01-18
Payer: MEDICAID

## 2024-01-18 DIAGNOSIS — E11.3293 TYPE 2 DIABETES MELLITUS WITH BOTH EYES AFFECTED BY MILD NONPROLIFERATIVE RETINOPATHY WITHOUT MACULAR EDEMA, WITH LONG-TERM CURRENT USE OF INSULIN: Primary | ICD-10-CM

## 2024-01-18 DIAGNOSIS — Z79.4 TYPE 2 DIABETES MELLITUS WITH BOTH EYES AFFECTED BY MILD NONPROLIFERATIVE RETINOPATHY WITHOUT MACULAR EDEMA, WITH LONG-TERM CURRENT USE OF INSULIN: Primary | ICD-10-CM

## 2024-01-18 NOTE — TELEPHONE ENCOUNTER
Approvedtoday  The request has been approved. The authorization is effective from 01/18/2024 to 01/17/2025, as long as the member is enrolled in their current health plan. The request was approved as submitted. A written notification letter will follow with additional details.  Drug  Mounjaro 7.5MG/0.5ML pen-injectors  Form  [employer] [place] Medicaid ePA Form 2017 NCPDP

## 2024-01-19 ENCOUNTER — TELEPHONE (OUTPATIENT)
Dept: FAMILY MEDICINE CLINIC | Facility: CLINIC | Age: 64
End: 2024-01-19
Payer: MEDICAID

## 2024-01-29 ENCOUNTER — TELEPHONE (OUTPATIENT)
Dept: ENDOCRINOLOGY | Facility: CLINIC | Age: 64
End: 2024-01-29
Payer: MEDICAID

## 2024-01-29 DIAGNOSIS — E11.3293 TYPE 2 DIABETES MELLITUS WITH BOTH EYES AFFECTED BY MILD NONPROLIFERATIVE RETINOPATHY WITHOUT MACULAR EDEMA, WITH LONG-TERM CURRENT USE OF INSULIN: ICD-10-CM

## 2024-01-29 DIAGNOSIS — Z79.4 TYPE 2 DIABETES MELLITUS WITH BOTH EYES AFFECTED BY MILD NONPROLIFERATIVE RETINOPATHY WITHOUT MACULAR EDEMA, WITH LONG-TERM CURRENT USE OF INSULIN: ICD-10-CM

## 2024-01-29 RX ORDER — PROCHLORPERAZINE 25 MG/1
1 SUPPOSITORY RECTAL
Qty: 1 EACH | Refills: 0 | Status: SHIPPED | OUTPATIENT
Start: 2024-01-29

## 2024-01-29 NOTE — TELEPHONE ENCOUNTER
Caller: Chris Tara    Relationship: Self    Best call back number: [phone]    Requested Prescriptions: Continuous Blood Gluc Transmit (Dexcom G6 Transmitter) misc   Requested Prescriptions      No prescriptions requested or ordered in this encounter        Pharmacy where request should be sent:    [facility] [id number] - [place], [place] - [address] [facility] AT [facility] [address] - [phone] PH - [phone] [address] [facility] [place] [address]       Last office visit with prescribing clinician: 12/6/2023   Last telemedicine visit with prescribing clinician: Visit date not found   Next office visit with prescribing clinician: 3/6/2024     Additional details provided by patient:     Does the patient have less than a 3 day supply:  [] Yes  [x] No    Would you like a call back once the refill request has been completed: [] Yes [x] No    If the office needs to give you a call back, can they leave a voicemail: [] Yes [x] No    Radha De Leon Rep   01/29/24 10:44 EST

## 2024-02-05 DIAGNOSIS — Z79.4 TYPE 2 DIABETES MELLITUS WITH BOTH EYES AFFECTED BY MILD NONPROLIFERATIVE RETINOPATHY WITHOUT MACULAR EDEMA, WITH LONG-TERM CURRENT USE OF INSULIN: ICD-10-CM

## 2024-02-05 DIAGNOSIS — E11.3293 TYPE 2 DIABETES MELLITUS WITH BOTH EYES AFFECTED BY MILD NONPROLIFERATIVE RETINOPATHY WITHOUT MACULAR EDEMA, WITH LONG-TERM CURRENT USE OF INSULIN: ICD-10-CM

## 2024-02-05 RX ORDER — INSULIN ASPART 100 [IU]/ML
INJECTION, SUSPENSION SUBCUTANEOUS
Qty: 30 ML | Refills: 5 | Status: SHIPPED | OUTPATIENT
Start: 2024-02-05

## 2024-02-05 NOTE — TELEPHONE ENCOUNTER
Caller: PerezClauTara    Relationship: Self    Best call back number: [phone] (home)       Requested Prescriptions:   Requested Prescriptions     Pending Prescriptions Disp Refills    insulin aspart prot & aspart (NovoLOG 70/30 FlexPen ReliOn) (70-30) 100 UNIT/ML suspension pen-injector injection 30 mL 5     Sig: Inject 25u under the skin before breakfast and 50u under the skin before dinner, titrate dose as directed for max daily dose 100u        Pharmacy where request should be sent:  [facility]    Last office visit with prescribing clinician: 12/6/2023   Last telemedicine visit with prescribing clinician: Visit date not found   Next office visit with prescribing clinician: 3/6/2024     Additional details provided by patient: PT WANTS DR TO KNOW SHE IS STILL NOT HAVING ANY LUCK WITH THE MOUNJARO. IT IS STILL GIVING HER DIARRHEA    Does the patient have less than a 3 day supply:  [x] Yes  [] No    Would you like a call back once the refill request has been completed: [] Yes [x] No    If the office needs to give you a call back, can they leave a voicemail: [] Yes [x] No    Radha Rodrigues Rep   02/05/24 09:36 EST

## 2024-02-05 NOTE — TELEPHONE ENCOUNTER
Rx Refill Note  Requested Prescriptions     Pending Prescriptions Disp Refills    insulin aspart prot & aspart (NovoLOG 70/30 FlexPen ReliOn) (70-30) 100 UNIT/ML suspension pen-injector injection 30 mL 5     Sig: Inject 25u under the skin before breakfast and 50u under the skin before dinner, titrate dose as directed for max daily dose 100u      Last office visit with prescribing clinician: 12/6/2023     Next office visit with prescribing clinician: 3/6/2024       Ella Calderon MA  02/05/24, 11:46 EST

## 2024-03-04 NOTE — PROGRESS NOTES
Chief complaint/Reason for consult: T2DM     HPI: Ms. Perez is a 63-year-old female with type 2 diabetes, history of breast cancer, hypertension, depression and mixed hyperlipidemia who comes for follow-up of type 2 diabetes.  She was last seen in this office on 12/6/2023 and reports since that time a lot of bothersome side effects from Mounjaro and being without 70/30 insulin for a few weeks due to pharmacy shortage with resultant hyperglycemia.     # Sdel1VX, uncontrolled due to hyperglycemia with complications  # Diabetic retinopathy   - Diagnosed: around 2014   - Current regimen includes: 70/30 25 units with breakfast and 50 units with supper and Mounjaro 7.5mg weekly   - Previously did well with Ozempic but it was out of stock so switched to Mounjaro  - Mounjaro is causing a lot of GI issues  - Tried metformin in the past but had intolerable GI issues   - Had a lot of yeast infections so SGLT2 inhibitors were stopped, not interested in trying it again  - Compliance with medications is good, rarely misses any doses although she is late with her insulin injections sometimes  - Eats one main meal per day (dinner), a smaller breakfast and has some snacks during the day   - She continues to have regular soda at dinner with her medications  - Access to food is an issue due to her fixed income   - HbA1c: 6.3% today   - Using Dexcom G6      CGM Download  -Dates reviewed: 2/22/2024-3/6/2024  -Data: 100% wear time, average glucose 161 mg/dL, standard deviation 44 mg/dL, GMI 7.2%, 2% very high, 29% high, 60% time in range, less than 1% low and less than 1% very low  -Interpretation: There is a lot of signal loss and malaise within the data but overall it shows reasonable glycemic control with occasional postprandial hyperglycemia worse in the evening     - Hypoglycemia occurs rarely   - Hyperglycemia occurs worse in the evening just after dinner  - 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), stable on  "gabapentin  - Patient denies gastroparesis   - Patient reports rotating injection sites   - Patient without known ASCVD   - taking ACEi; blood pressure today 140/72; reports home readings are lower (120s/70s)      DM Health Maintenance:  Ophtho: done 11/2023 and reports nonproliferative retinopathy that is stable (see media tab)   Monofilament / Foot exam: abnormal, completed 5/24/2023, has diabetic shoes which has helped a lot; no new foot sores reported today  Lipids/Statin: taking a statin and Zetia with last FLP showing LDL 87 on 11/14/2023  RACHAEL: Negative on 11/21/2023  TSH: 0.644 done 11/14/2023  Aspirin: not taking      # Mixed hyperlipidemia  - Compliant with atorvastatin 80 mg daily and ezetimibe 10 mg daily, no reported side effects on these medications  - FLP showing total cholesterol 164, HDL 55, LDL 87 and triglycerides 124 done 11/14/2023    Past medical history, past surgical history, family history and social history reviewed within this encounter.     Review of Systems   Constitutional:  Negative for activity change and unexpected weight change.   HENT:  Negative for trouble swallowing and voice change.    Eyes:  Negative for visual disturbance.   Respiratory:  Negative for shortness of breath.    Cardiovascular:  Negative for chest pain.   Gastrointestinal:  Positive for abdominal pain and diarrhea.   Endocrine: Negative for cold intolerance and heat intolerance.   Genitourinary:  Negative for dysuria.   Musculoskeletal:  Positive for arthralgias.   Skin:  Negative for rash and wound.   Neurological:  Positive for numbness.   Psychiatric/Behavioral:  Negative for agitation and confusion.         /72   Pulse 90   Ht 170.2 cm (67\")   Wt 93.4 kg (206 lb)   SpO2 97%   BMI 32.26 kg/m²      Physical Exam  Vitals reviewed.   Constitutional:       General: She is not in acute distress.     Appearance: She is obese.   HENT:      Head: Normocephalic.      Nose: Nose normal.      Mouth/Throat:      " Comments: Poor dentition  Eyes:      Conjunctiva/sclera: Conjunctivae normal.   Cardiovascular:      Rate and Rhythm: Normal rate.   Pulmonary:      Effort: Pulmonary effort is normal.   Abdominal:      Tenderness: There is no guarding.   Musculoskeletal:         General: No deformity.   Skin:     General: Skin is warm and dry.   Neurological:      Mental Status: She is alert and oriented to person, place, and time.   Psychiatric:         Mood and Affect: Mood normal.         Behavior: Behavior normal.        Labs and images reviewed as noted in the HPI    Assessment and plan:    Diagnoses and all orders for this visit:    1. Type 2 diabetes mellitus with both eyes affected by mild nonproliferative retinopathy without macular edema, with long-term current use of insulin (Primary)  Assessment & Plan:  -Diabetes remains reasonably well-controlled  -Complications include diabetic retinopathy  -Patient with chemotherapy-induced polyneuropathy  -Patient with significant side effects from Mounjaro, recommend switching back to Ozempic 2 mg weekly  -Continue 70/30 25 units with breakfast and 50 units with supper  -Continue Dexcom CGM; if there continues to be a lot of signal drop in noise will consider switching to Dexcom G7 although typically this device is worse with signal loss  -Continue ACEi; blood pressure today 140/72; reports home readings are lower (120s/70s)      DM Health Maintenance:  Ophtho: done 11/2023 and reports nonproliferative retinopathy that is stable (see media tab)   Monofilament / Foot exam: abnormal, completed 5/24/2023, has diabetic shoes which has helped a lot; no new foot sores reported today  Lipids/Statin: taking a statin and Zetia with last FLP showing LDL 87 on 11/14/2023  RACHAEL: Negative on 11/21/2023  TSH: 0.644 done 11/14/2023  Aspirin: not taking     Orders:  -     POC Glycosylated Hemoglobin (Hb A1C)  -     Semaglutide, 2 MG/DOSE, (Ozempic, 2 MG/DOSE,) 8 MG/3ML solution pen-injector;  Inject 2 mg under the skin into the appropriate area as directed 1 (One) Time Per Week.  Dispense: 9 mL; Refill: 3    2. Mixed hyperlipidemia  Assessment & Plan:  -Continue atorvastatin 80 mg daily and ezetimibe 10 mg daily  -If lipids are persistently elevated on repeat check we will consider the addition of bempedoic acid versus PCSK9 inhibitor in the future           Return in about 3 months (around 6/6/2024) for T2DM.     Electronically signed by: Kyle S Rosenstein, MD

## 2024-03-06 ENCOUNTER — OFFICE VISIT (OUTPATIENT)
Dept: ENDOCRINOLOGY | Facility: CLINIC | Age: 64
End: 2024-03-06
Payer: MEDICAID

## 2024-03-06 VITALS
BODY MASS INDEX: 32.33 KG/M2 | HEART RATE: 90 BPM | DIASTOLIC BLOOD PRESSURE: 72 MMHG | OXYGEN SATURATION: 97 % | SYSTOLIC BLOOD PRESSURE: 140 MMHG | HEIGHT: 67 IN | WEIGHT: 206 LBS

## 2024-03-06 DIAGNOSIS — E11.3293 TYPE 2 DIABETES MELLITUS WITH BOTH EYES AFFECTED BY MILD NONPROLIFERATIVE RETINOPATHY WITHOUT MACULAR EDEMA, WITH LONG-TERM CURRENT USE OF INSULIN: Primary | ICD-10-CM

## 2024-03-06 DIAGNOSIS — E78.2 MIXED HYPERLIPIDEMIA: ICD-10-CM

## 2024-03-06 DIAGNOSIS — Z79.4 TYPE 2 DIABETES MELLITUS WITH BOTH EYES AFFECTED BY MILD NONPROLIFERATIVE RETINOPATHY WITHOUT MACULAR EDEMA, WITH LONG-TERM CURRENT USE OF INSULIN: Primary | ICD-10-CM

## 2024-03-06 LAB
EXPIRATION DATE: ABNORMAL
HBA1C MFR BLD: 6.3 % (ref 4.5–5.7)
Lab: ABNORMAL

## 2024-03-06 RX ORDER — SEMAGLUTIDE 2.68 MG/ML
2 INJECTION, SOLUTION SUBCUTANEOUS WEEKLY
Qty: 9 ML | Refills: 3 | Status: SHIPPED | OUTPATIENT
Start: 2024-03-06

## 2024-03-06 NOTE — ASSESSMENT & PLAN NOTE
-Continue atorvastatin 80 mg daily and ezetimibe 10 mg daily  -If lipids are persistently elevated on repeat check we will consider the addition of bempedoic acid versus PCSK9 inhibitor in the future

## 2024-03-06 NOTE — ASSESSMENT & PLAN NOTE
-Diabetes remains reasonably well-controlled  -Complications include diabetic retinopathy  -Patient with chemotherapy-induced polyneuropathy  -Patient with significant side effects from Mounjaro, recommend switching back to Ozempic 2 mg weekly  -Continue 70/30 25 units with breakfast and 50 units with supper  -Continue Dexcom CGM; if there continues to be a lot of signal drop in noise will consider switching to Dexcom G7 although typically this device is worse with signal loss  -Continue ACEi; blood pressure today 140/72; reports home readings are lower (120s/70s)      DM Health Maintenance:  Ophtho: done 11/2023 and reports nonproliferative retinopathy that is stable (see media tab)   Monofilament / Foot exam: abnormal, completed 5/24/2023, has diabetic shoes which has helped a lot; no new foot sores reported today  Lipids/Statin: taking a statin and Zetia with last FLP showing LDL 87 on 11/14/2023  RACHAEL: Negative on 11/21/2023  TSH: 0.644 done 11/14/2023  Aspirin: not taking

## 2024-03-08 ENCOUNTER — PRIOR AUTHORIZATION (OUTPATIENT)
Dept: ENDOCRINOLOGY | Facility: CLINIC | Age: 64
End: 2024-03-08
Payer: MEDICAID

## 2024-03-23 DIAGNOSIS — T45.1X5A CHEMOTHERAPY-INDUCED NEUROPATHY: ICD-10-CM

## 2024-03-23 DIAGNOSIS — G62.0 CHEMOTHERAPY-INDUCED NEUROPATHY: ICD-10-CM

## 2024-03-25 RX ORDER — GABAPENTIN 400 MG/1
400 CAPSULE ORAL
Qty: 90 CAPSULE | OUTPATIENT
Start: 2024-03-25

## 2024-04-15 ENCOUNTER — TELEPHONE (OUTPATIENT)
Dept: ENDOCRINOLOGY | Facility: CLINIC | Age: 64
End: 2024-04-15
Payer: MEDICAID

## 2024-04-15 DIAGNOSIS — Z79.4 TYPE 2 DIABETES MELLITUS WITH BOTH EYES AFFECTED BY MILD NONPROLIFERATIVE RETINOPATHY WITHOUT MACULAR EDEMA, WITH LONG-TERM CURRENT USE OF INSULIN: ICD-10-CM

## 2024-04-15 DIAGNOSIS — E11.3293 TYPE 2 DIABETES MELLITUS WITH BOTH EYES AFFECTED BY MILD NONPROLIFERATIVE RETINOPATHY WITHOUT MACULAR EDEMA, WITH LONG-TERM CURRENT USE OF INSULIN: ICD-10-CM

## 2024-04-15 DIAGNOSIS — I10 PRIMARY HYPERTENSION: ICD-10-CM

## 2024-04-15 RX ORDER — PROCHLORPERAZINE 25 MG/1
SUPPOSITORY RECTAL
Qty: 9 EACH | Refills: 3 | Status: SHIPPED | OUTPATIENT
Start: 2024-04-15

## 2024-04-15 RX ORDER — AMLODIPINE AND BENAZEPRIL HYDROCHLORIDE 5; 40 MG/1; MG/1
1 CAPSULE ORAL DAILY
Qty: 30 CAPSULE | Refills: 0 | Status: SHIPPED | OUTPATIENT
Start: 2024-04-15

## 2024-04-15 NOTE — TELEPHONE ENCOUNTER
Caller: Tara Perez    Relationship: Self    Best call back number: [phone]     Requested Prescriptions:   Requested Prescriptions     Pending Prescriptions Disp Refills    amLODIPine-benazepril (LOTREL) 5-40 MG per capsule 90 capsule 1     Sig: Take 1 capsule by mouth Daily.        Pharmacy where request should be sent: [facility] [id number] [place], [place] - [address] AT [facility] [address] - [phone] [facility] [phone] FX     Last office visit with prescribing clinician: 11/21/2023   Last telemedicine visit with prescribing clinician: Visit date not found   Next office visit with prescribing clinician: Visit date not found     Additional details provided by patient:     Does the patient have less than a 3 day supply:  [] Yes  [x] No    Would you like a call back once the refill request has been completed: [] Yes [x] No    If the office needs to give you a call back, can they leave a voicemail: [] Yes [x] No    Cadance Dunaway, RegSched Rep   04/15/24 08:44 EDT

## 2024-04-15 NOTE — TELEPHONE ENCOUNTER
Rx Refill Note  Requested Prescriptions     Pending Prescriptions Disp Refills    Continuous Blood Gluc Sensor (Dexcom G6 Sensor) 9 each 3     Sig: Use Every 10 (Ten) Days.      Last office visit with prescribing clinician: 3/6/2024   Last telemedicine visit with prescribing clinician: Visit date not found   Next office visit with prescribing clinician: 6/12/2024                         Would you like a call back once the refill request has been completed: [] Yes [] No    If the office needs to give you a call back, can they leave a voicemail: [] Yes [] No    Bell Yan MA  04/15/24, 10:05 EDT

## 2024-04-15 NOTE — TELEPHONE ENCOUNTER
Caller: Tara Perez    Relationship to patient: Self    Best call back number: [phone]     Patient is needing: PT IS NEEDING A NEW PRESCRIPTION SENT TO PHARMACY FOR     Continuous Blood Gluc Sensor (Dexcom G6 Sensor)       PHARMACY:  [facility] [id number] - [place], [place] - [address] [facility] AT [facility] [address] - [phone]  - [phone]    [address] [facility] [address]   Phone: [phone] Fax: [phone]   Hours: Not open 24 hours

## 2024-04-25 ENCOUNTER — TELEPHONE (OUTPATIENT)
Dept: ENDOCRINOLOGY | Facility: CLINIC | Age: 64
End: 2024-04-25
Payer: MEDICAID

## 2024-04-25 NOTE — TELEPHONE ENCOUNTER
"    Caller: Tara Perez    Relationship to patient: Self    Best call back number: [phone]    Patient is needing: DEXCOM QUIT WORKING. WENT TO OFFICE YESTERDAY AND WAS TOLD TO PUT IN A NEW SENSOR BUT STILL DOESNT WORK. SAYING \"SIGNAL LOST\" PLEASE CALL PT TO ADVISE.        "

## 2024-04-25 NOTE — TELEPHONE ENCOUNTER
Caller: Tara Perez    Relationship to patient: Self    Best call back number: [phone]     Patient is needing: PATIENT WAS ABLE TO GET DEXCOM TO SEND HER OUT A NEW TRANSMITTER AND 3 SENSORS, BUT IT WILL TAKE 3-5 BUSINESS DAYS AND SHE DOESN'T HAVE A WAY TO TEST HER BLOOD SUGAR UNTIL THEN. PLEASE CALL BACK AND ADVISE AS TO WHAT TO DO. THANKS.

## 2024-04-26 RX ORDER — GLUCOSAMINE HCL/CHONDROITIN SU 500-400 MG
CAPSULE ORAL
Qty: 100 EACH | Refills: 11 | Status: SHIPPED | OUTPATIENT
Start: 2024-04-26

## 2024-04-26 RX ORDER — LANCETS 33 GAUGE
1 EACH MISCELLANEOUS 2 TIMES DAILY
Qty: 100 EACH | Refills: 11 | Status: SHIPPED | OUTPATIENT
Start: 2024-04-26

## 2024-04-26 NOTE — TELEPHONE ENCOUNTER
Spoke to pt - advised I can send in a meter/strips/lancets so she can check fingerstick blood sugars until her dexcom is here

## 2024-04-29 DIAGNOSIS — T45.1X5A CHEMOTHERAPY-INDUCED NEUROPATHY: ICD-10-CM

## 2024-04-29 DIAGNOSIS — G62.0 CHEMOTHERAPY-INDUCED NEUROPATHY: ICD-10-CM

## 2024-04-29 RX ORDER — CYCLOBENZAPRINE HCL 10 MG
10 TABLET ORAL NIGHTLY
Qty: 30 TABLET | Refills: 0 | Status: SHIPPED | OUTPATIENT
Start: 2024-04-29

## 2024-04-29 NOTE — TELEPHONE ENCOUNTER
Caller: Tara Perez    Relationship: Self    Best call back number: [phone]     Requested Prescriptions:   Requested Prescriptions     Pending Prescriptions Disp Refills    cyclobenzaprine (FLEXERIL) 10 MG tablet 90 tablet 1     Sig: Take 1 tablet by mouth Every Night.        Pharmacy where request should be sent:    [facility] [id number] [address] AT [facility] [address] - [phone] [facility] [phone] FX     Last office visit with prescribing clinician: 11/21/2023   Last telemedicine visit with prescribing clinician: Visit date not found   Next office visit with prescribing clinician: Visit date not found     Additional details provided by patient: HAS 4 PILLS LEFT.    Does the patient have less than a 3 day supply:  [] Yes  [] No    Would you like a call back once the refill request has been completed: [x] Yes [] No    If the office needs to give you a call back, can they leave a voicemail: [] Yes [] No    Radha Mcdaniel Rep   04/29/24 09:15 EDT

## 2024-05-03 DIAGNOSIS — R61 NIGHT SWEATS: ICD-10-CM

## 2024-05-06 RX ORDER — CLONIDINE HYDROCHLORIDE 0.1 MG/1
0.1 TABLET ORAL
Qty: 30 TABLET | Refills: 0 | Status: SHIPPED | OUTPATIENT
Start: 2024-05-06

## 2024-05-14 DIAGNOSIS — I10 PRIMARY HYPERTENSION: ICD-10-CM

## 2024-05-14 RX ORDER — AMLODIPINE AND BENAZEPRIL HYDROCHLORIDE 5; 40 MG/1; MG/1
1 CAPSULE ORAL DAILY
Qty: 30 CAPSULE | Refills: 0 | Status: SHIPPED | OUTPATIENT
Start: 2024-05-14

## 2024-05-14 NOTE — TELEPHONE ENCOUNTER
Caller: Tara Perez    Relationship: Self    Best call back number: [phone]     Requested Prescriptions:   Requested Prescriptions     Pending Prescriptions Disp Refills    amLODIPine-benazepril (LOTREL) 5-40 MG per capsule 30 capsule 0     Sig: Take 1 capsule by mouth Daily.        Pharmacy where request should be sent: [facility] [id number] [facility], [place] - [address] [facility] AT [facility] [address] - [phone] [facility] [phone] FX     Last office visit with prescribing clinician: 11/21/2023   Last telemedicine visit with prescribing clinician: Visit date not found   Next office visit with prescribing clinician: Visit date not found     Additional details provided by patient: PT HAS 6 PILLS LEFT.    Does the patient have less than a 3 day supply:  [] Yes  [x] No    Would you like a call back once the refill request has been completed: [] Yes [x] No    If the office needs to give you a call back, can they leave a voicemail: [] Yes [x] No    Radha Gaxiola Rep   05/14/24 09:40 EDT

## 2024-05-28 DIAGNOSIS — R61 NIGHT SWEATS: ICD-10-CM

## 2024-05-28 DIAGNOSIS — T45.1X5A CHEMOTHERAPY-INDUCED NEUROPATHY: ICD-10-CM

## 2024-05-28 DIAGNOSIS — G62.0 CHEMOTHERAPY-INDUCED NEUROPATHY: ICD-10-CM

## 2024-05-28 RX ORDER — CYCLOBENZAPRINE HCL 10 MG
10 TABLET ORAL NIGHTLY
Qty: 30 TABLET | Refills: 0 | Status: SHIPPED | OUTPATIENT
Start: 2024-05-28

## 2024-05-28 RX ORDER — CLONIDINE HYDROCHLORIDE 0.1 MG/1
0.1 TABLET ORAL
Qty: 30 TABLET | Refills: 0 | Status: SHIPPED | OUTPATIENT
Start: 2024-05-28

## 2024-05-28 NOTE — TELEPHONE ENCOUNTER
Caller: Tara Perez    Relationship: Self    Best call back number: [phone]    Requested Prescriptions:   Requested Prescriptions     Pending Prescriptions Disp Refills    cloNIDine (CATAPRES) 0.1 MG tablet 30 tablet 0     Sig: Take 1 tablet by mouth every night at bedtime.    cyclobenzaprine (FLEXERIL) 10 MG tablet 30 tablet 0     Sig: Take 1 tablet by mouth Every Night.        Pharmacy where request should be sent: [facility] [id number] [address] AT [facility] [address] - [phone] [facility] [phone] FX     Last office visit with prescribing clinician: 11/21/2023   Last telemedicine visit with prescribing clinician: Visit date not found   Next office visit with prescribing clinician: Visit date not found     Additional details provided by patient:     Does the patient have less than a 3 day supply:  [] Yes  [x] No    Would you like a call back once the refill request has been completed: [] Yes [x] No    If the office needs to give you a call back, can they leave a voicemail: [] Yes [x] No    Radha Weathers Rep   05/28/24 09:01 EDT

## 2024-06-10 NOTE — PROGRESS NOTES
Chief complaint/Reason for consult: T2DM    HPI:  Ms. Perez is a 63-year-old female with type 2 diabetes, history of breast cancer, hypertension, depression and mixed hyperlipidemia who comes for follow-up of type 2 diabetes.  She was last seen in this office on 3/6/2024 and reports since that time doing well without issues.      # [id number], controlled with complications  # Diabetic retinopathy   - Diagnosed: around 2014   - Current regimen includes: 70/30 25 units with breakfast and 50 units with supper and Ozempic 2mg weekly   - Mounjaro caused a lot of GI issues, Ozempic seems to be better but she still has occasional abdominal pain, she is not interested in reducing the dose or stopping it  - Tried metformin in the past but had intolerable GI issues   - Had a lot of yeast infections so SGLT2 inhibitors were stopped, not interested in trying it again  - Compliance with medications is good, rarely misses any doses although she is late with her insulin injections sometimes with supper  - Eats one main meal per day (dinner), a smaller breakfast and has some snacks during the day   - She continues to have regular soda at dinner with her medications, not interested in cutting back  - Access to food is an issue due to her fixed income   - HbA1c: 6.3% today   - Using Dexcom G6      CGM Download  -Dates reviewed: 5/30/2024-6/12/2024  -Data: 100% wear time, average glucose 160 mg/dL, standard deviation 39 mg deciliter, GMI 7.1%, 3% very high, 23% high, 74% time in range, 0% low and 0% very low  -Interpretation: Overall fair glycemic control with postprandial hyperglycemia worse after supper     - Hypoglycemia occurs rarely   - Hyperglycemia occurs worse in the evening just after dinner  - 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), stable on gabapentin  - Patient denies gastroparesis   - Patient reports rotating injection sites   - Patient without known ASCVD   - taking ACEi; blood pressure today 120/78    "  DM Health Maintenance:  Ophtho: done 11/2023 and reports nonproliferative retinopathy that is stable (see media tab)   Monofilament / Foot exam: abnormal, completed 5/24/2023, has diabetic shoes which has helped a lot  Lipids/Statin: taking a statin and Zetia with last FLP showing LDL 87 on 11/14/2023  RACHAEL: Negative on 11/21/2023  TSH: 0.644 done 11/14/2023  Aspirin: not taking      # Mixed hyperlipidemia  - Compliant with atorvastatin 80 mg daily and ezetimibe 10 mg daily, no reported side effects on these medications  - FLP showing total cholesterol 164, HDL 55, LDL 87 and triglycerides 124 done 11/14/2023    Past medical history, past surgical history, family history and social history reviewed within this encounter.     Review of Systems   Constitutional:  Negative for activity change and unexpected weight change.   HENT:  Negative for trouble swallowing and voice change.    Eyes:  Negative for visual disturbance.   Respiratory:  Negative for shortness of breath.    Cardiovascular:  Negative for chest pain.   Gastrointestinal:  Positive for abdominal pain. Negative for constipation, diarrhea and nausea.   Endocrine: Negative for cold intolerance and heat intolerance.   Genitourinary:  Negative for dysuria.   Musculoskeletal:  Positive for arthralgias. Negative for gait problem.   Skin:  Negative for rash.   Neurological:  Positive for numbness.   Psychiatric/Behavioral:  Negative for agitation and confusion.         /78 (BP Location: Right arm, Patient Position: Sitting, Cuff Size: Adult)   Pulse 87   Ht 162.6 cm (64\")   Wt 93.7 kg (206 lb 8 oz)   SpO2 97%   BMI 35.45 kg/m²      Physical Exam  Vitals reviewed.   Constitutional:       General: She is not in acute distress.     Appearance: She is obese.   HENT:      Head: Normocephalic.      Nose: Nose normal.      Mouth/Throat:      Comments: Poor dentition  Eyes:      Conjunctiva/sclera: Conjunctivae normal.   Cardiovascular:      Rate and Rhythm: " Normal rate.      Pulses:           Dorsalis pedis pulses are 2+ on the right side and 2+ on the left side.   Pulmonary:      Effort: Pulmonary effort is normal.   Abdominal:      Tenderness: There is no guarding.   Musculoskeletal:         General: No deformity.      Right foot: Prominent metatarsal heads present. No deformity or bunion.      Left foot: Prominent metatarsal heads present. No deformity or bunion.   Feet:      Right foot:      Protective Sensation: 8 sites tested.  5 sites sensed.      Skin integrity: Skin integrity normal.      Toenail Condition: Right toenails are normal.      Left foot:      Protective Sensation: 8 sites tested.  5 sites sensed.      Skin integrity: Skin integrity normal.      Toenail Condition: Left toenails are normal.      Comments: Patient with decreased sensation to monofilament on distal aspect of feet bilaterally; she has relatively high arches with prominent metatarsal heads  Skin:     General: Skin is warm and dry.   Neurological:      Mental Status: She is alert and oriented to person, place, and time.   Psychiatric:         Mood and Affect: Mood normal.         Behavior: Behavior normal.        Labs and images reviewed as noted in the HPI    Assessment and plan:    Diagnoses and all orders for this visit:    1. Type 2 diabetes mellitus with both eyes affected by mild nonproliferative retinopathy without macular edema, with long-term current use of insulin (Primary)  Assessment & Plan:  -Diabetes is reasonably well-controlled  -Complications include known diabetic retinopathy  -Patient also has neuropathy which is likely due to chemotherapy from breast cancer treatment  -Continue good glycemic control to prevent worsening of neuropathy, kidney damage, eye damage and cardiovascular disease  -Continue Ozempic 2.0 mg weekly  -Continue 70/30 25 units with breakfast and 50 units with supper; reduced supper dose to 25 units if she does not drink regular soda  -Continue Dexcom  CGM  - taking benazepril; blood pressure today 120/78     DM Health Maintenance:  Ophtho: done 11/2023 and reports nonproliferative retinopathy that is stable (see media tab)   Monofilament / Foot exam: Completed today with diminished sensation to monofilament on distal aspects, she does have relatively high arches with prominent metatarsal heads and I think that she could benefit from a proper orthotic/shoe fitting, patient requests to see a podiatrist and Kristine, referral given to the patient  Lipids/Statin: Continue statin and Zetia with last FLP showing LDL 87 on 11/14/2023  RACHAEL: Negative on 11/21/2023  TSH: 0.644 done 11/14/2023  Aspirin: not taking    Orders:  -     POC Glycosylated Hemoglobin (Hb A1C)  -     Continuous Glucose Sensor (Dexcom G6 Sensor); Use Every 10 (Ten) Days.  Dispense: 9 each; Refill: 3  -     Continuous Glucose Transmitter (Dexcom G6 Transmitter) misc; Use 1 each Every 3 (Three) Months.  Dispense: 1 each; Refill: 3  -     insulin aspart prot & aspart (NovoLOG 70/30 FlexPen ReliOn) (70-30) 100 UNIT/ML suspension pen-injector injection; Inject 25u under the skin before breakfast and 50u under the skin before dinner, titrate dose as directed for max daily dose 100u  Dispense: 30 mL; Refill: 5  -     Insulin Pen Needle (B-D ULTRAFINE III SHORT PEN) 31G X 8 MM misc; Use as directed twice daily  Dispense: 200 each; Refill: 3  -     Semaglutide, 2 MG/DOSE, (Ozempic, 2 MG/DOSE,) 8 MG/3ML solution pen-injector; Inject 2 mg under the skin into the appropriate area as directed 1 (One) Time Per Week.  Dispense: 9 mL; Refill: 3  -     Ambulatory Referral to Podiatry    2. Mixed hyperlipidemia  Assessment & Plan:  -LDL is just barely above goal on last set of labs  -Continue atorvastatin 80 mg daily and ezetimibe 10 mg daily  -Will repeat fasting lipid panel at upcoming visit and if LDL remains elevated will discuss the addition of a third agent    Orders:  -     atorvastatin (LIPITOR) 80 MG tablet;  Take 1 tablet by mouth Daily for 180 days.  Dispense: 90 tablet; Refill: 1  -     ezetimibe (ZETIA) 10 MG tablet; Take 1 tablet by mouth every night at bedtime.  Dispense: 90 tablet; Refill: 3       Return in about 4 months (around 10/12/2024) for T2DM.       Please note that portions of this note may have been completed with a voice recognition program. Efforts were made to edit the dictations, but occasionally words are mistranscribed.     Electronically signed by: Kyle S Rosenstein, MD

## 2024-06-11 DIAGNOSIS — I10 PRIMARY HYPERTENSION: ICD-10-CM

## 2024-06-11 RX ORDER — AMLODIPINE AND BENAZEPRIL HYDROCHLORIDE 5; 40 MG/1; MG/1
1 CAPSULE ORAL DAILY
Qty: 30 CAPSULE | Refills: 0 | Status: SHIPPED | OUTPATIENT
Start: 2024-06-11

## 2024-06-11 NOTE — TELEPHONE ENCOUNTER
Caller: Perez Tara    Relationship: Self    Best call back number: [phone]     Requested Prescriptions:   Requested Prescriptions     Pending Prescriptions Disp Refills    amLODIPine-benazepril (LOTREL) 5-40 MG per capsule 30 capsule 0     Sig: Take 1 capsule by mouth Daily.        Pharmacy where request should be sent: [facility] [id number], [place] - [address] AT [facility] [address] - [phone] [facility] [phone] FX     Last office visit with prescribing clinician: 11/21/2023   Last telemedicine visit with prescribing clinician: Visit date not found   Next office visit with prescribing clinician: Visit date not found     Additional details provided by patient: PATIENT HAS 7 DAYS REMAINING OF THIS MEDICATION        Does the patient have less than a 3 day supply:  [] Yes  [x] No    Would you like a call back once the refill request has been completed: [] Yes [x] No    If the office needs to give you a call back, can they leave a voicemail: [] Yes [x] No    Radha Carrasco Rep   06/11/24 09:14 EDT

## 2024-06-11 NOTE — TELEPHONE ENCOUNTER
Rx Refill Note    Requested Prescriptions     Pending Prescriptions Disp Refills    amLODIPine-benazepril (LOTREL) 5-40 MG per capsule 30 capsule 0     Sig: Take 1 capsule by mouth Daily.        Last office visit with prescribing clinician: 11/21/2023      Next office visit with prescribing clinician: Visit date not found   Last labs:   Last refill: 05/14/2024   Pharmacy (be sure to add in Epic). correct

## 2024-06-12 ENCOUNTER — OFFICE VISIT (OUTPATIENT)
Dept: ENDOCRINOLOGY | Facility: CLINIC | Age: 64
End: 2024-06-12
Payer: MEDICAID

## 2024-06-12 VITALS
BODY MASS INDEX: 35.26 KG/M2 | OXYGEN SATURATION: 97 % | HEART RATE: 87 BPM | SYSTOLIC BLOOD PRESSURE: 120 MMHG | WEIGHT: 206.5 LBS | HEIGHT: 64 IN | DIASTOLIC BLOOD PRESSURE: 78 MMHG

## 2024-06-12 DIAGNOSIS — Z79.4 TYPE 2 DIABETES MELLITUS WITH BOTH EYES AFFECTED BY MILD NONPROLIFERATIVE RETINOPATHY WITHOUT MACULAR EDEMA, WITH LONG-TERM CURRENT USE OF INSULIN: Primary | ICD-10-CM

## 2024-06-12 DIAGNOSIS — E11.3293 TYPE 2 DIABETES MELLITUS WITH BOTH EYES AFFECTED BY MILD NONPROLIFERATIVE RETINOPATHY WITHOUT MACULAR EDEMA, WITH LONG-TERM CURRENT USE OF INSULIN: Primary | ICD-10-CM

## 2024-06-12 DIAGNOSIS — E78.2 MIXED HYPERLIPIDEMIA: ICD-10-CM

## 2024-06-12 LAB
EXPIRATION DATE: ABNORMAL
HBA1C MFR BLD: 6.3 % (ref 4.5–5.7)
Lab: ABNORMAL

## 2024-06-12 PROCEDURE — 95251 CONT GLUC MNTR ANALYSIS I&R: CPT | Performed by: HOSPITALIST

## 2024-06-12 PROCEDURE — 1159F MED LIST DOCD IN RCRD: CPT | Performed by: HOSPITALIST

## 2024-06-12 PROCEDURE — 83036 HEMOGLOBIN GLYCOSYLATED A1C: CPT | Performed by: HOSPITALIST

## 2024-06-12 PROCEDURE — 3078F DIAST BP <80 MM HG: CPT | Performed by: HOSPITALIST

## 2024-06-12 PROCEDURE — 3044F HG A1C LEVEL LT 7.0%: CPT | Performed by: HOSPITALIST

## 2024-06-12 PROCEDURE — 3074F SYST BP LT 130 MM HG: CPT | Performed by: HOSPITALIST

## 2024-06-12 PROCEDURE — 99214 OFFICE O/P EST MOD 30 MIN: CPT | Performed by: HOSPITALIST

## 2024-06-12 PROCEDURE — 1160F RVW MEDS BY RX/DR IN RCRD: CPT | Performed by: HOSPITALIST

## 2024-06-12 RX ORDER — ATORVASTATIN CALCIUM 80 MG/1
80 TABLET, FILM COATED ORAL DAILY
Qty: 90 TABLET | Refills: 1 | Status: SHIPPED | OUTPATIENT
Start: 2024-06-12 | End: 2024-12-09

## 2024-06-12 RX ORDER — INSULIN ASPART 100 [IU]/ML
INJECTION, SUSPENSION SUBCUTANEOUS
Qty: 30 ML | Refills: 5 | Status: SHIPPED | OUTPATIENT
Start: 2024-06-12

## 2024-06-12 RX ORDER — PROCHLORPERAZINE 25 MG/1
SUPPOSITORY RECTAL
Qty: 9 EACH | Refills: 3 | Status: SHIPPED | OUTPATIENT
Start: 2024-06-12

## 2024-06-12 RX ORDER — PEN NEEDLE, DIABETIC 31 GX5/16"
NEEDLE, DISPOSABLE MISCELLANEOUS
Qty: 200 EACH | Refills: 3 | Status: SHIPPED | OUTPATIENT
Start: 2024-06-12

## 2024-06-12 RX ORDER — SEMAGLUTIDE 2.68 MG/ML
2 INJECTION, SOLUTION SUBCUTANEOUS WEEKLY
Qty: 9 ML | Refills: 3 | Status: SHIPPED | OUTPATIENT
Start: 2024-06-12

## 2024-06-12 RX ORDER — EZETIMIBE 10 MG/1
10 TABLET ORAL
Qty: 90 TABLET | Refills: 3 | Status: SHIPPED | OUTPATIENT
Start: 2024-06-12

## 2024-06-12 RX ORDER — PROCHLORPERAZINE 25 MG/1
1 SUPPOSITORY RECTAL
Qty: 1 EACH | Refills: 3 | Status: SHIPPED | OUTPATIENT
Start: 2024-06-12

## 2024-06-12 NOTE — ASSESSMENT & PLAN NOTE
-Diabetes is reasonably well-controlled  -Complications include known diabetic retinopathy  -Patient also has neuropathy which is likely due to chemotherapy from breast cancer treatment  -Continue good glycemic control to prevent worsening of neuropathy, kidney damage, eye damage and cardiovascular disease  -Continue Ozempic 2.0 mg weekly  -Continue 70/30 25 units with breakfast and 50 units with supper; reduced supper dose to 25 units if she does not drink regular soda  -Continue Dexcom CGM  - taking benazepril; blood pressure today 120/78     DM Health Maintenance:  Ophtho: done 11/2023 and reports nonproliferative retinopathy that is stable (see media tab)   Monofilament / Foot exam: Completed today with diminished sensation to monofilament on distal aspects, she does have relatively high arches with prominent metatarsal heads and I think that she could benefit from a proper orthotic/shoe fitting, patient requests to see a podiatrist and Kristine, referral given to the patient  Lipids/Statin: Continue statin and Zetia with last FLP showing LDL 87 on 11/14/2023  RACHAEL: Negative on 11/21/2023  TSH: 0.644 done 11/14/2023  Aspirin: not taking

## 2024-06-12 NOTE — ASSESSMENT & PLAN NOTE
-LDL is just barely above goal on last set of labs  -Continue atorvastatin 80 mg daily and ezetimibe 10 mg daily  -Will repeat fasting lipid panel at upcoming visit and if LDL remains elevated will discuss the addition of a third agent

## 2024-07-01 ENCOUNTER — TELEPHONE (OUTPATIENT)
Dept: FAMILY MEDICINE CLINIC | Facility: CLINIC | Age: 64
End: 2024-07-01
Payer: MEDICAID

## 2024-07-01 DIAGNOSIS — R61 NIGHT SWEATS: ICD-10-CM

## 2024-07-01 DIAGNOSIS — T45.1X5A CHEMOTHERAPY-INDUCED NEUROPATHY: ICD-10-CM

## 2024-07-01 DIAGNOSIS — G62.0 CHEMOTHERAPY-INDUCED NEUROPATHY: ICD-10-CM

## 2024-07-01 RX ORDER — GABAPENTIN 400 MG/1
400 CAPSULE ORAL
Qty: 90 CAPSULE | Refills: 1 | OUTPATIENT
Start: 2024-07-01

## 2024-07-01 RX ORDER — CLONIDINE HYDROCHLORIDE 0.1 MG/1
0.1 TABLET ORAL
Qty: 30 TABLET | Refills: 0 | OUTPATIENT
Start: 2024-07-01

## 2024-07-01 RX ORDER — CYCLOBENZAPRINE HCL 10 MG
10 TABLET ORAL NIGHTLY
Qty: 30 TABLET | Refills: 0 | OUTPATIENT
Start: 2024-07-01

## 2024-07-01 NOTE — TELEPHONE ENCOUNTER
Caller: Tara Perez    Relationship: Self    Best call back number: [phone]    Requested Prescriptions:   Requested Prescriptions     Pending Prescriptions Disp Refills    cloNIDine (CATAPRES) 0.1 MG tablet 30 tablet 0     Sig: Take 1 tablet by mouth every night at bedtime. Needs appointment before refill runs out.    cyclobenzaprine (FLEXERIL) 10 MG tablet 30 tablet 0     Sig: Take 1 tablet by mouth Every Night. Needs appointment before refill runs out.    gabapentin (NEURONTIN) 400 MG capsule 90 capsule 1     Sig: Take 1 capsule by mouth every night at bedtime.        Pharmacy where request should be sent: [facility] [id number] - [place], [place] - [address] AT [facility] [address] - [phone] [facility] [phone] FX     Last office visit with prescribing clinician: 11/21/2023   Last telemedicine visit with prescribing clinician: Visit date not found   Next office visit with prescribing clinician: Visit date not found     Additional details provided by patient: PATIENT HAS 3 LEFT CYCLOBENZAPRINE.      Does the patient have less than a 3 day supply:  [x] Yes  [] No    Would you like a call back once the refill request has been completed: [] Yes [x] No    If the office needs to give you a call back, can they leave a voicemail: [] Yes [] No    Radha Selby   07/01/24 08:34 EDT

## 2024-07-08 ENCOUNTER — OFFICE VISIT (OUTPATIENT)
Dept: FAMILY MEDICINE CLINIC | Facility: CLINIC | Age: 64
End: 2024-07-08
Payer: MEDICAID

## 2024-07-08 VITALS
SYSTOLIC BLOOD PRESSURE: 126 MMHG | HEART RATE: 89 BPM | BODY MASS INDEX: 34.95 KG/M2 | DIASTOLIC BLOOD PRESSURE: 74 MMHG | OXYGEN SATURATION: 98 % | RESPIRATION RATE: 18 BRPM | HEIGHT: 64 IN | WEIGHT: 204.7 LBS

## 2024-07-08 DIAGNOSIS — E53.8 VITAMIN B12 DEFICIENCY: ICD-10-CM

## 2024-07-08 DIAGNOSIS — J30.9 ALLERGIC RHINITIS, UNSPECIFIED SEASONALITY, UNSPECIFIED TRIGGER: ICD-10-CM

## 2024-07-08 DIAGNOSIS — E55.9 VITAMIN D DEFICIENCY: ICD-10-CM

## 2024-07-08 DIAGNOSIS — R61 NIGHT SWEATS: ICD-10-CM

## 2024-07-08 DIAGNOSIS — Z79.899 HIGH RISK MEDICATION USE: ICD-10-CM

## 2024-07-08 DIAGNOSIS — I10 PRIMARY HYPERTENSION: Primary | ICD-10-CM

## 2024-07-08 DIAGNOSIS — E78.2 MIXED HYPERLIPIDEMIA: ICD-10-CM

## 2024-07-08 DIAGNOSIS — G62.0 CHEMOTHERAPY-INDUCED NEUROPATHY: ICD-10-CM

## 2024-07-08 DIAGNOSIS — Z79.4 TYPE 2 DIABETES MELLITUS WITH BOTH EYES AFFECTED BY MILD NONPROLIFERATIVE RETINOPATHY WITHOUT MACULAR EDEMA, WITH LONG-TERM CURRENT USE OF INSULIN: ICD-10-CM

## 2024-07-08 DIAGNOSIS — T45.1X5A CHEMOTHERAPY-INDUCED NEUROPATHY: ICD-10-CM

## 2024-07-08 DIAGNOSIS — E11.3293 TYPE 2 DIABETES MELLITUS WITH BOTH EYES AFFECTED BY MILD NONPROLIFERATIVE RETINOPATHY WITHOUT MACULAR EDEMA, WITH LONG-TERM CURRENT USE OF INSULIN: ICD-10-CM

## 2024-07-08 LAB
POC AMPHETAMINES: NEGATIVE
POC BARBITURATES: NEGATIVE
POC BENZODIAZEPHINES: NEGATIVE
POC COCAINE: NEGATIVE
POC METHADONE: NEGATIVE
POC METHAMPHETAMINE SCREEN URINE: NEGATIVE
POC OPIATES: NEGATIVE
POC OXYCODONE: NEGATIVE
POC PHENCYCLIDINE: NEGATIVE
POC PROPOXYPHENE: NEGATIVE
POC THC: NEGATIVE
POC TRICYCLIC ANTIDEPRESSANTS: NEGATIVE

## 2024-07-08 PROCEDURE — 3044F HG A1C LEVEL LT 7.0%: CPT | Performed by: PHYSICIAN ASSISTANT

## 2024-07-08 PROCEDURE — 36415 COLL VENOUS BLD VENIPUNCTURE: CPT | Performed by: PHYSICIAN ASSISTANT

## 2024-07-08 PROCEDURE — 3074F SYST BP LT 130 MM HG: CPT | Performed by: PHYSICIAN ASSISTANT

## 2024-07-08 PROCEDURE — 3078F DIAST BP <80 MM HG: CPT | Performed by: PHYSICIAN ASSISTANT

## 2024-07-08 PROCEDURE — 99215 OFFICE O/P EST HI 40 MIN: CPT | Performed by: PHYSICIAN ASSISTANT

## 2024-07-08 PROCEDURE — 1126F AMNT PAIN NOTED NONE PRSNT: CPT | Performed by: PHYSICIAN ASSISTANT

## 2024-07-08 RX ORDER — GABAPENTIN 400 MG/1
400 CAPSULE ORAL
Qty: 90 CAPSULE | Refills: 1 | Status: SHIPPED | OUTPATIENT
Start: 2024-07-08

## 2024-07-08 RX ORDER — AMLODIPINE AND BENAZEPRIL HYDROCHLORIDE 5; 40 MG/1; MG/1
1 CAPSULE ORAL DAILY
Qty: 90 CAPSULE | Refills: 1 | Status: SHIPPED | OUTPATIENT
Start: 2024-07-08

## 2024-07-08 RX ORDER — CYCLOBENZAPRINE HCL 10 MG
10 TABLET ORAL NIGHTLY
Qty: 90 TABLET | Refills: 1 | Status: SHIPPED | OUTPATIENT
Start: 2024-07-08

## 2024-07-08 RX ORDER — CLONIDINE HYDROCHLORIDE 0.1 MG/1
0.1 TABLET ORAL
Qty: 90 TABLET | Refills: 1 | Status: SHIPPED | OUTPATIENT
Start: 2024-07-08

## 2024-07-08 RX ORDER — LEVOCETIRIZINE DIHYDROCHLORIDE 5 MG/1
5 TABLET, FILM COATED ORAL EVERY EVENING
Qty: 90 TABLET | Refills: 3 | Status: SHIPPED | OUTPATIENT
Start: 2024-07-08

## 2024-07-08 RX ORDER — CARBAMAZEPINE 100 MG/1
100 TABLET, CHEWABLE ORAL 3 TIMES DAILY
Qty: 270 TABLET | Refills: 1 | Status: SHIPPED | OUTPATIENT
Start: 2024-07-08

## 2024-07-08 RX ORDER — FOLIC ACID 1 MG/1
1 TABLET ORAL DAILY
Qty: 90 TABLET | Refills: 3 | Status: SHIPPED | OUTPATIENT
Start: 2024-07-08

## 2024-07-08 NOTE — PROGRESS NOTES
Patient Office Visit      Patient Name: Tara Perez  : 1960   MRN: [id number]     Chief Complaint:    Chief Complaint   Patient presents with    Hypertension    Peripheral Neuropathy    Allergies    Nausea       History of Present Illness: Tara Perez is a 63 y.o. female who is here today for follow-up.  She complains of nausea that started 3 to 4 months ago.  She has been seeing the foot doctor who changed her B12 formulation.  Patient notes only eating 1 meal per day.  She takes 70/30 insulin twice a day and says she is sometimes dropping low in the evenings.  Diabetes is managed by endocrinology.  It looks like patient's Ozempic was increased back to the 2 mg dose back in March.    Subjective      Review of Systems:   Review of Systems   Respiratory:  Negative for shortness of breath.    Cardiovascular:  Negative for chest pain, palpitations and leg swelling.        Past Medical History:   Past Medical History:   Diagnosis Date    Mild nonproliferative diabetic retinopathy of both eyes without macular edema associated with type 2 diabetes mellitus 10/19/2022    Type 2 diabetes mellitus        Past Surgical History:   Past Surgical History:   Procedure Laterality Date    APPENDECTOMY      MASTECTOMY      Double Mastectomy    TONSILLECTOMY AND ADENOIDECTOMY         Family History:   Family History   Problem Relation Age of Onset    Diabetes Father     Diabetes Sister     Diabetes Brother        Social History:   Social History     Socioeconomic History    Marital status:    Tobacco Use    Smoking status: Former     Current packs/day: 0.00     Average packs/day: 1 pack/day for 17.0 years (17.0 ttl pk-yrs)     Types: Cigarettes     Start date:      Quit date:      Years since quittin.5    Smokeless tobacco: Never   Vaping Use    Vaping status: Never Used   Substance and Sexual Activity    Alcohol use: Never    Drug use: Never    Sexual activity: Not Currently     Partners: Male  "      Allergies:   Allergies   Allergen Reactions    Penicillins Rash       Objective     Physical Exam:  Vital Signs:   Vitals:    07/08/24 0826   BP: 126/74   BP Location: Left arm   Patient Position: Sitting   Cuff Size: Large Adult   Pulse: 89   Resp: 18   SpO2: 98%   Weight: 92.9 kg (204 lb 11.2 oz)   Height: 162.6 cm (64.02\")   PainSc: 0-No pain     Body mass index is 35.12 kg/m².           Physical Exam  Constitutional:       Appearance: Normal appearance.   Cardiovascular:      Rate and Rhythm: Normal rate and regular rhythm.   Pulmonary:      Effort: Pulmonary effort is normal.      Breath sounds: Normal breath sounds.   Musculoskeletal:      Cervical back: Normal range of motion and neck supple.   Neurological:      Mental Status: She is alert and oriented to person, place, and time.   Psychiatric:         Mood and Affect: Mood normal.         Behavior: Behavior normal.         Thought Content: Thought content normal.         Judgment: Judgment normal.         Procedures    Assessment / Plan      Assessment/Plan:   Diagnoses and all orders for this visit:    1. Primary hypertension (Primary)  Assessment & Plan:  Hypertension is stable and controlled  Continue current treatment regimen.  Blood pressure will be reassessed in 6 months.    Orders:  -     amLODIPine-benazepril (LOTREL) 5-40 MG per capsule; Take 1 capsule by mouth Daily.  Dispense: 90 capsule; Refill: 1    2. Vitamin B12 deficiency  -     Vitamin B12  -     Folate  -     folic acid (FOLVITE) 1 MG tablet; Take 1 tablet by mouth Daily.  Dispense: 90 tablet; Refill: 3    3. Mixed hyperlipidemia  -     T4, Free  -     TSH    4. Vitamin D deficiency    5. High risk medication use  -     CBC Auto Differential  -     Comprehensive Metabolic Panel  -     CK  -     POC Urine Drug Screen, Triage    6. Type 2 diabetes mellitus with both eyes affected by mild nonproliferative retinopathy without macular edema, with long-term current use of " insulin  Assessment & Plan:  Patient is on a 70/30 insulin twice a day.  She needs to eat 2 small meals per day.  We talked about 2 small meals with 3 to 4 ounces of protein, half to 1 serving of carbohydrate and nonstarchy fruits/vegetables.  Patient does admit to drinking some soda with sugar and recommend she discontinue.  The nausea seems to have started when Ozempic was increased so patient needs to eat the 2 smaller meals instead of 1 large meal and needs to pay attention to the types of food she is eating.  I think she may be going low in the evenings because she is only eating 1 meal per day.    Orders:  -     Hemoglobin A1c    7. Chemotherapy-induced neuropathy  Assessment & Plan:  Controlled substance was refilled today. Informed consent and treatment agreement signed. Educational materials provided and discussed previous visits on appropriate use, disposal and storage of medications. Her UNA was appropriate. Written information regarding appropriate use, storage and disposal of medication was given and discussed. Point of care UDS was performed today.     Orders:  -     carBAMazepine (TEGretol) 100 MG chewable tablet; Chew 1 tablet 3 (Three) Times a Day.  Dispense: 270 tablet; Refill: 1  -     cyclobenzaprine (FLEXERIL) 10 MG tablet; Take 1 tablet by mouth Every Night.  Dispense: 90 tablet; Refill: 1  -     gabapentin (NEURONTIN) 400 MG capsule; Take 1 capsule by mouth every night at bedtime.  Dispense: 90 capsule; Refill: 1    8. Night sweats  -     cloNIDine (CATAPRES) 0.1 MG tablet; Take 1 tablet by mouth every night at bedtime.  Dispense: 90 tablet; Refill: 1    9. Allergic rhinitis, unspecified seasonality, unspecified trigger  Assessment & Plan:  Patient said pharmacy stopped filling her Zyrtec and told her to get it over-the-counter, will see if insurance will cover Xyzal.    Orders:  -     levocetirizine (XYZAL) 5 MG tablet; Take 1 tablet by mouth Every Evening.  Dispense: 90 tablet; Refill:  3           Medications:     Current Outpatient Medications:     amLODIPine-benazepril (LOTREL) 5-40 MG per capsule, Take 1 capsule by mouth Daily., Disp: 90 capsule, Rfl: 1    atorvastatin (LIPITOR) 80 MG tablet, Take 1 tablet by mouth Daily for 180 days., Disp: 90 tablet, Rfl: 1    Blood Glucose Monitoring Suppl device, Use as directed to check blood sugar dx e11.65 please dispense insurance preferred, Disp: 1 each, Rfl: 0    carBAMazepine (TEGretol) 100 MG chewable tablet, Chew 1 tablet 3 (Three) Times a Day., Disp: 270 tablet, Rfl: 1    cloNIDine (CATAPRES) 0.1 MG tablet, Take 1 tablet by mouth every night at bedtime., Disp: 90 tablet, Rfl: 1    Continuous Blood Gluc  (Dexcom G6 ) device, 1 each Take As Directed., Disp: 1 each, Rfl: 0    Continuous Glucose Sensor (Dexcom G6 Sensor), Use Every 10 (Ten) Days., Disp: 9 each, Rfl: 3    Continuous Glucose Transmitter (Dexcom G6 Transmitter) misc, Use 1 each Every 3 (Three) Months., Disp: 1 each, Rfl: 3    cyclobenzaprine (FLEXERIL) 10 MG tablet, Take 1 tablet by mouth Every Night., Disp: 90 tablet, Rfl: 1    Easy Touch Lancets 30G/Twist misc, Use as directed up to 4 times daily, Disp: 100 each, Rfl: 11    ezetimibe (ZETIA) 10 MG tablet, Take 1 tablet by mouth every night at bedtime., Disp: 90 tablet, Rfl: 3    folic acid (FOLVITE) 1 MG tablet, Take 1 tablet by mouth Daily., Disp: 90 tablet, Rfl: 3    gabapentin (NEURONTIN) 400 MG capsule, Take 1 capsule by mouth every night at bedtime., Disp: 90 capsule, Rfl: 1    Glucose Blood (Blood Glucose Test) strip, Use to check blood sugar 2 times a day dx e11.65 on insulin please dispense strips with new meter, Disp: 100 each, Rfl: 11    insulin aspart prot & aspart (NovoLOG 70/30 FlexPen ReliOn) (70-30) 100 UNIT/ML suspension pen-injector injection, Inject 25u under the skin before breakfast and 50u under the skin before dinner, titrate dose as directed for max daily dose 100u, Disp: 30 mL, Rfl: 5    Insulin  Pen Needle (B-D ULTRAFINE III SHORT PEN) 31G X 8 MM misc, Use as directed twice daily, Disp: 200 each, Rfl: 3    Lancets 33G misc, Use 1 each 2 (Two) Times a Day. Dx e11.65 on insulin, Disp: 100 each, Rfl: 11    OneTouch Ultra test strip, Use as instructed to test twice daily, Disp: 100 each, Rfl: 11    Semaglutide, 2 MG/DOSE, (Ozempic, 2 MG/DOSE,) 8 MG/3ML solution pen-injector, Inject 2 mg under the skin into the appropriate area as directed 1 (One) Time Per Week., Disp: 9 mL, Rfl: 3    levocetirizine (XYZAL) 5 MG tablet, Take 1 tablet by mouth Every Evening., Disp: 90 tablet, Rfl: 3    Methylcobalamin 500 MCG chewable tablet, Chew 3,000 mcg 2 (Two) Times a Day. Patient reports taking a 3000 mcg gummy bid per podiatrist, Disp: , Rfl:     I spent 40 minutes caring for Tara on this date of service. This time includes time spent by me in the following activities:preparing for the visit, reviewing tests, obtaining and/or reviewing a separately obtained history, performing a medically appropriate examination and/or evaluation , counseling and educating the patient/family/caregiver, ordering medications, tests, or procedures, and documenting information in the medical record    Follow Up:   Return in about 6 months (around 1/8/2025) for Annual physical.    Rayne Santiago PA-C   [facility]     NOTE TO PATIENT: The 21st Century Cures Act makes medical notes like these available to patients in the interest of transparency. However, be advised this is a medical document. It is intended as peer to peer communication. It is written in medical language and may contain abbreviations or verbiage that are unfamiliar. It may appear blunt or direct. Medical documents are intended to carry relevant information, facts as evident, and the clinical opinion of the practitioner.

## 2024-07-08 NOTE — ASSESSMENT & PLAN NOTE
Patient is on a 70/30 insulin twice a day.  She needs to eat 2 small meals per day.  We talked about 2 small meals with 3 to 4 ounces of protein, half to 1 serving of carbohydrate and nonstarchy fruits/vegetables.  Patient does admit to drinking some soda with sugar and recommend she discontinue.  The nausea seems to have started when Ozempic was increased so patient needs to eat the 2 smaller meals instead of 1 large meal and needs to pay attention to the types of food she is eating.  I think she may be going low in the evenings because she is only eating 1 meal per day.

## 2024-07-08 NOTE — ASSESSMENT & PLAN NOTE
Patient said pharmacy stopped filling her Zyrtec and told her to get it over-the-counter, will see if insurance will cover Xyzal.

## 2024-07-08 NOTE — ASSESSMENT & PLAN NOTE
Controlled substance was refilled today. Informed consent and treatment agreement signed. Educational materials provided and discussed previous visits on appropriate use, disposal and storage of medications. Her UNA was appropriate. Written information regarding appropriate use, storage and disposal of medication was given and discussed. Point of care UDS was performed today.

## 2024-07-09 LAB
ALBUMIN SERPL-MCNC: 4.3 G/DL (ref 3.9–4.9)
ALP SERPL-CCNC: 85 IU/L (ref 44–121)
ALT SERPL-CCNC: 13 IU/L (ref 0–32)
AST SERPL-CCNC: 24 IU/L (ref 0–40)
BASOPHILS # BLD AUTO: 0 X10E3/UL (ref 0–0.2)
BASOPHILS NFR BLD AUTO: 0 %
BILIRUB SERPL-MCNC: 0.3 MG/DL (ref 0–1.2)
BUN SERPL-MCNC: 11 MG/DL (ref 8–27)
BUN/CREAT SERPL: 11 (ref 12–28)
CALCIUM SERPL-MCNC: 9.2 MG/DL (ref 8.7–10.3)
CHLORIDE SERPL-SCNC: 104 MMOL/L (ref 96–106)
CK SERPL-CCNC: 126 U/L (ref 32–182)
CO2 SERPL-SCNC: 19 MMOL/L (ref 20–29)
CREAT SERPL-MCNC: 1 MG/DL (ref 0.57–1)
EGFRCR SERPLBLD CKD-EPI 2021: 64 ML/MIN/1.73
EOSINOPHIL # BLD AUTO: 0.2 X10E3/UL (ref 0–0.4)
EOSINOPHIL NFR BLD AUTO: 3 %
ERYTHROCYTE [DISTWIDTH] IN BLOOD BY AUTOMATED COUNT: 13.1 % (ref 11.7–15.4)
FOLATE SERPL-MCNC: 9.9 NG/ML
GLOBULIN SER CALC-MCNC: 3.1 G/DL (ref 1.5–4.5)
GLUCOSE SERPL-MCNC: 102 MG/DL (ref 70–99)
HBA1C MFR BLD: 6.5 % (ref 4.8–5.6)
HCT VFR BLD AUTO: 43.6 % (ref 34–46.6)
HGB BLD-MCNC: 14.1 G/DL (ref 11.1–15.9)
IMM GRANULOCYTES # BLD AUTO: 0 X10E3/UL (ref 0–0.1)
IMM GRANULOCYTES NFR BLD AUTO: 0 %
LYMPHOCYTES # BLD AUTO: 2.6 X10E3/UL (ref 0.7–3.1)
LYMPHOCYTES NFR BLD AUTO: 37 %
MCH RBC QN AUTO: 30.1 PG (ref 26.6–33)
MCHC RBC AUTO-ENTMCNC: 32.3 G/DL (ref 31.5–35.7)
MCV RBC AUTO: 93 FL (ref 79–97)
MONOCYTES # BLD AUTO: 0.5 X10E3/UL (ref 0.1–0.9)
MONOCYTES NFR BLD AUTO: 7 %
NEUTROPHILS # BLD AUTO: 3.7 X10E3/UL (ref 1.4–7)
NEUTROPHILS NFR BLD AUTO: 53 %
PLATELET # BLD AUTO: 231 X10E3/UL (ref 150–450)
POTASSIUM SERPL-SCNC: 4.5 MMOL/L (ref 3.5–5.2)
PROT SERPL-MCNC: 7.4 G/DL (ref 6–8.5)
RBC # BLD AUTO: 4.69 X10E6/UL (ref 3.77–5.28)
SODIUM SERPL-SCNC: 138 MMOL/L (ref 134–144)
T4 FREE SERPL-MCNC: 0.8 NG/DL (ref 0.82–1.77)
TSH SERPL DL<=0.005 MIU/L-ACNC: 0.9 UIU/ML (ref 0.45–4.5)
VIT B12 SERPL-MCNC: >2000 PG/ML (ref 232–1245)
WBC # BLD AUTO: 7 X10E3/UL (ref 3.4–10.8)

## 2024-07-24 ENCOUNTER — OFFICE VISIT (OUTPATIENT)
Dept: ONCOLOGY | Facility: CLINIC | Age: 64
End: 2024-07-24
Payer: MEDICAID

## 2024-07-24 VITALS
RESPIRATION RATE: 18 BRPM | HEIGHT: 64 IN | WEIGHT: 203 LBS | DIASTOLIC BLOOD PRESSURE: 84 MMHG | HEART RATE: 105 BPM | SYSTOLIC BLOOD PRESSURE: 122 MMHG | BODY MASS INDEX: 34.66 KG/M2 | TEMPERATURE: 98.2 F | OXYGEN SATURATION: 96 %

## 2024-07-24 DIAGNOSIS — Z85.3 HISTORY OF BILATERAL BREAST CANCER: Primary | Chronic | ICD-10-CM

## 2024-07-24 PROCEDURE — 3079F DIAST BP 80-89 MM HG: CPT | Performed by: NURSE PRACTITIONER

## 2024-07-24 PROCEDURE — 3074F SYST BP LT 130 MM HG: CPT | Performed by: NURSE PRACTITIONER

## 2024-07-24 PROCEDURE — 99214 OFFICE O/P EST MOD 30 MIN: CPT | Performed by: NURSE PRACTITIONER

## 2024-07-24 PROCEDURE — 1126F AMNT PAIN NOTED NONE PRSNT: CPT | Performed by: NURSE PRACTITIONER

## 2024-07-24 PROCEDURE — 1160F RVW MEDS BY RX/DR IN RCRD: CPT | Performed by: NURSE PRACTITIONER

## 2024-07-24 PROCEDURE — 1159F MED LIST DOCD IN RCRD: CPT | Performed by: NURSE PRACTITIONER

## 2024-07-24 NOTE — PROGRESS NOTES
CHIEF COMPLAINT: Intermittent swelling under her arms and feeling of fullness in her chest    Problem List:  Oncology/Hematology History Overview Note   1.  History of bilateral breast cancer  2.  Hypertension  3.  Diabetes  4.  Hyperlipidemia  5.  Depression    Oncology history timeline:    -11/23/2021 office note from Dr.Vidya Zaidi indicates right breast T1BN0 low-grade ductal carcinoma estrogen receptor positive on Femara.  Left breast 12:00 and 7:00 hormone receptor positive T1CN0 adenocarcinoma.  Bilateral mastectomy.  Received dose dense AC followed by Taxol ending December 2014.  HER2/chitra negative.  ER and KS positive.  Hot flashes managed with clonidine 0.1 mg nightly.  Intolerant of aromatase inhibitor.  Plan for annual follow-up CBC, CMP and clinical exam.  -5/17/2022 CMP and CBC unremarkable    -7/12/2022 [facility] hematology oncology initial consultation: I saw her for the first time today.    I reviewed 5/20/2014 biopsy pathology report which reveals...   right breast low-grade invasive ductal carcinoma grade 1 [place] Osman 5 out of 9.  ER 3+99%, KS 3+ 99%, HER2/chitra 1+ negative, Ki-67 borderline 3+12%.    Left breast mass 7:00 invasive ductal grade 2, [place] score 6 out of 9.  ER 3+100%, KS 13+ 100%, HER2/chitra equivocal negative on FISH.    Left breast 9:00 low-grade invasive ductal carcinoma grade 1 [place] score 5 out of 9.  ER 3+90% KS 3+100%   HER2/chitra negative    I reviewed her 6/5/2014 bilateral mastectomy pathology report...  right breast simple mastectomy showed intermediate grade invasive ductal carcinoma 1 cm grade 2 score 6 out of 9.  1 sentinel node negative.  No lymph vascular invasion.  Pathologic T1BN0  Left breast mastectomy 2 foci.  1.5 cm and 1.2 cm.  Grade 2.  Score 6 out of 9.  Margins negative.  1 sentinel node and 2 additional nodes taken all negative.  Pathologic T1CN0   Her CBC and CMP from May were unremarkable.  She confirms that she received dose dense AC followed  by Taxol and then approximately 6 years of Femara which she quit in 2021 and now lives in [place].  She does not recall having had Oncotype.  Bilateral chest wall exam today is benign.  Routine follow-up at this point can be bilateral chest wall clinical examination and perhaps a CBC to look for late complications of chemotherapy including secondary malignancies.  This does not necessarily have to be done by a medical oncologist as a primary care doctor can examine her chest wall as a part of their routine physical and they can do her labs but she would desire to continue to follow with us as she was doing with her prior medical oncologist.  As breast cancer was multifocal in the left breast and present in the contralateral breast, I will offer her genetic counseling though obviously she has already had bilateral mastectomies.  With small node-negative disease I would not put her through breast cancer index testing and would not give her extended adjuvant hormone blockade.  She has been off of hormone blockade since the latter part of 2021.     History of bilateral breast cancer       HISTORY OF PRESENT ILLNESS:  The patient is a 63 y.o. female, here for follow up on management of history of breast cancer as outlined above in the oncology history.  Tara reports overall she has been doing well this past year.  Over the last few months this summer she states that she has had occasional swelling under her arms and some musculoskeletal discomforts in her chest wall.  No lymphadenopathy that she is aware of.  She denies any shortness of breath, no cough.  No fevers or chills.  She has been very busy working in her garden, she has 13 tomato plants and also has been julia.  She push mows her yard.  She denies any headaches, dizziness or vision changes.  No new bone pain.  Her appetite is normal, no change in her bowel or bladder habits.  Her weight is stable.    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 10/19/2022    Type 2 diabetes mellitus      Past Surgical History:   Procedure Laterality Date    APPENDECTOMY      MASTECTOMY      Double Mastectomy    TONSILLECTOMY AND ADENOIDECTOMY         Allergies   Allergen Reactions    Penicillins Rash       Family History and Social History reviewed and changed as necessary    REVIEW OF SYSTEM:   Occasional sensation of swelling under her arms and musculoskeletal discomfort in her chest wall    PHYSICAL EXAM:  Well-developed, well-nourished healthy appearing female in no distress  No cervical, supraclavicular or axillary nodes palpable on exam  Bilateral chest wall exam is benign status post bilateral mastectomies.  Skin tissue is soft, no abnormal rashes, nodules, masses or lesions.  No edema noted in the axillary or chest wall regions.  Abdomen is soft, nontender, nondistended    Vitals:    07/24/24 1320   BP: 122/84   Pulse: 105   Resp: 18   Temp: 98.2 °F (36.8 °C)   SpO2: 96%   Weight: 92.1 kg (203 lb)   Height: 162.6 cm (64\")     Vitals:    07/24/24 1320   PainSc: 0-No pain   PainLoc: Arm  Comment: having pain under both armpits on occasion at a 7          ECOG score: 0           Vitals reviewed.  Labs available in epic from 7/8/2024 reviewed at time of visit    ECOG: (0) Fully Active - Able to Carry On All Pre-disease Performance Without Restriction    Lab Results   Component Value Date    HGB 14.1 07/08/2024    HCT 43.6 07/08/2024    MCV 93 07/08/2024     07/08/2024    WBC 7.0 07/08/2024    NEUTROABS 3.7 07/08/2024    LYMPHSABS 2.6 07/08/2024    MONOSABS 0.5 07/08/2024    EOSABS 0.2 07/08/2024    BASOSABS 0.0 07/08/2024       Lab Results   Component Value Date    GLUCOSE 102 (H) 07/08/2024    BUN 11 07/08/2024    CREATININE 1.00 07/08/2024     07/08/2024    K 4.5 07/08/2024     07/08/2024    CO2 19 (L) 07/08/2024    CALCIUM 9.2 " "07/08/2024    ALBUMIN 4.3 07/08/2024    BILITOT 0.3 07/08/2024    ALKPHOS 85 07/08/2024    AST 24 07/08/2024    ALT 13 07/08/2024             ASSESSMENT & PLAN:    1.  History of bilateral breast cancer  2.  Intermittent swelling in the upper chest and axillary regions    Discussion: Tara looks well.  No evidence of recurrent breast cancer on clinical exam.  She has no swelling, no adenopathy, chest wall is benign on exam.  She reports intermittent swelling under her arms and what she describes as \"frogging\" in her chest wall which to the best of my ability I think she is saying she has some muscle spasms at times.  I discussed with her that I think she is having some intermittent lymphedema in the axillary region and upper chest wall.  She has been very active this summer, she push mows her lawn, she has a garden that she takes care of, she has been julia her tomatoes.  It has been quite hot.  I think all of this combined may lead to some mild lymphedema.  She has no other associated respiratory symptoms.  For now we will monitor and she understands to let us know if the symptoms worsen.  I have asked her to call if swelling returns and I will get her worked in for an exam.  I did review her labs that were done with her PCP from 7/8/2024 and these were normal.  She is now 10 years out from completing chemotherapy portion of her cancer treatment, she has had bilateral mastectomies, she took hormonal blockade therapy for 6 years.    Return to clinic in 1 year for follow-up and sooner if any concerns    I spent 31 minutes caring for Tara on this date of service. This time includes time spent by me in the following activities: preparing for the visit, reviewing tests, performing a medically appropriate examination and/or evaluation, counseling and educating the patient/family/caregiver, and documenting information in the medical record.     Danisha Ferro, APRN    07/24/2024      "

## 2024-09-06 ENCOUNTER — TELEPHONE (OUTPATIENT)
Dept: ENDOCRINOLOGY | Facility: CLINIC | Age: 64
End: 2024-09-06
Payer: MEDICAID

## 2024-09-06 NOTE — TELEPHONE ENCOUNTER
Caller: Tara Perez    Relationship: Self    Best call back number: [phone]     Requested Prescriptions:   Requested Prescriptions      No prescriptions requested or ordered in this encounter    [facility]    Pharmacy where request should be sent:    [facility]  Last office visit with prescribing clinician: 6/12/2024     Next office visit with prescribing clinician: 10/16/2024     Does the patient have less than a 3 day supply:  [] Yes  [x] No    Would you like a call back once the refill request has been completed: [] Yes [x] No    If the office needs to give you a call back, can they leave a voicemail: [] Yes [x] No    Radha Nicole   09/06/24 09:01 EDT

## 2024-10-16 ENCOUNTER — OFFICE VISIT (OUTPATIENT)
Dept: ENDOCRINOLOGY | Facility: CLINIC | Age: 64
End: 2024-10-16
Payer: MEDICAID

## 2024-10-16 VITALS
WEIGHT: 203 LBS | HEIGHT: 64 IN | DIASTOLIC BLOOD PRESSURE: 76 MMHG | SYSTOLIC BLOOD PRESSURE: 124 MMHG | HEART RATE: 81 BPM | OXYGEN SATURATION: 99 % | BODY MASS INDEX: 34.66 KG/M2

## 2024-10-16 DIAGNOSIS — E78.2 MIXED HYPERLIPIDEMIA: ICD-10-CM

## 2024-10-16 DIAGNOSIS — Z79.4 TYPE 2 DIABETES MELLITUS WITH BOTH EYES AFFECTED BY MILD NONPROLIFERATIVE RETINOPATHY WITHOUT MACULAR EDEMA, WITH LONG-TERM CURRENT USE OF INSULIN: Primary | ICD-10-CM

## 2024-10-16 DIAGNOSIS — E11.3293 TYPE 2 DIABETES MELLITUS WITH BOTH EYES AFFECTED BY MILD NONPROLIFERATIVE RETINOPATHY WITHOUT MACULAR EDEMA, WITH LONG-TERM CURRENT USE OF INSULIN: Primary | ICD-10-CM

## 2024-10-16 LAB
EXPIRATION DATE: ABNORMAL
HBA1C MFR BLD: 6.1 % (ref 4.5–5.7)
Lab: ABNORMAL

## 2024-10-16 RX ORDER — PROCHLORPERAZINE 25 MG/1
SUPPOSITORY RECTAL
Qty: 9 EACH | Refills: 3 | Status: SHIPPED | OUTPATIENT
Start: 2024-10-16

## 2024-10-16 RX ORDER — PEN NEEDLE, DIABETIC 31 GX5/16"
NEEDLE, DISPOSABLE MISCELLANEOUS
Qty: 200 EACH | Refills: 3 | Status: SHIPPED | OUTPATIENT
Start: 2024-10-16

## 2024-10-16 RX ORDER — INSULIN ASPART 100 [IU]/ML
INJECTION, SUSPENSION SUBCUTANEOUS
Qty: 30 ML | Refills: 5 | Status: SHIPPED | OUTPATIENT
Start: 2024-10-16

## 2024-10-16 RX ORDER — SEMAGLUTIDE 2.68 MG/ML
2 INJECTION, SOLUTION SUBCUTANEOUS WEEKLY
Qty: 9 ML | Refills: 3 | Status: SHIPPED | OUTPATIENT
Start: 2024-10-16

## 2024-10-16 RX ORDER — PROCHLORPERAZINE 25 MG/1
1 SUPPOSITORY RECTAL
Qty: 1 EACH | Refills: 3 | Status: SHIPPED | OUTPATIENT
Start: 2024-10-16

## 2024-10-16 NOTE — PROGRESS NOTES
Chief complaint/Reason for consult: T2DM    HPI: Ms. Perez is a 63-year-old female with type 2 diabetes, history of breast cancer, hypertension, depression and mixed hyperlipidemia who comes for follow-up of type 2 diabetes.  She was last seen in this office on 6/12/2024 and reports since that time doing well without significant changes in her health.     # [id number], controlled with complications  # Diabetic retinopathy   - Diagnosed: around 2014   - Current regimen includes: 70/30 25 units with breakfast and 50 units with supper and Ozempic 2mg weekly   - Mounjaro caused a lot of GI issues, Ozempic is doing well   - Recently reports being more hungry and having slightly higher blood glucoses  - Tried metformin in the past but had intolerable GI issues   - Had a lot of yeast infections so SGLT2 inhibitors were stopped, not interested in trying it again  - Compliance with medications is good, rarely misses any doses although she is late with her insulin injections sometimes with supper because she just forgets it  - Eats one main meal per day (dinner), a smaller breakfast and has some snacks during the day   - She continues to have regular soda at dinner with her medications most days, not interested in cutting back  - Access to food is an issue due to her fixed income   - HbA1c: 6.1% today   - Using Dexcom G6      CGM Download  -Dates reviewed: 10/3/2024-10/16/2024  -Data: 100% wear time, average glucose 171 mg/dL, standard deviation 42 mg/dL, GMI 7.4%, 4% very high, 34% high, 62% time in range, 0% low and 0% very low  -Interpretation: Reasonable fasting glycemic control with postprandial hyperglycemia worse after supper     - Hypoglycemia occurs rarely with prolonged fasting  - Hyperglycemia occurs worse in the evening just after dinner  - 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), stable on gabapentin  - Patient denies gastroparesis   - Patient reports rotating injection sites   - Patient without  "known ASCVD   - taking ACEi; blood pressure today 124/76     DM Health Maintenance:  Ophtho: done 11/2023 and reports nonproliferative retinopathy that is stable (see media tab)   Monofilament / Foot exam: abnormal, completed 6/12/2024, no reported foot sores  Lipids/Statin: taking a statin and Zetia with last FLP showing LDL 87 on 11/14/2023  RACHAEL: Negative on 11/21/2023  TSH: 0.898 done 7/8/2024  Aspirin: not taking      # Mixed hyperlipidemia  - Compliant with atorvastatin 80 mg daily and ezetimibe 10 mg daily, no reported side effects on these medications  - FLP showing total cholesterol 164, HDL 55, LDL 87 and triglycerides 124 done 11/14/2023    Past medical history, past surgical history, family history and social history reviewed within this encounter.     Review of Systems   Constitutional:  Negative for activity change and unexpected weight change.   HENT:  Negative for trouble swallowing and voice change.    Eyes:  Negative for visual disturbance.   Respiratory:  Negative for shortness of breath.    Cardiovascular:  Negative for chest pain.   Gastrointestinal:  Negative for abdominal pain.   Endocrine: Negative for cold intolerance and heat intolerance.   Musculoskeletal:  Negative for gait problem.   Skin:  Negative for rash.   Neurological:  Positive for numbness.   Psychiatric/Behavioral:  Negative for agitation.         /76 (BP Location: Right arm, Patient Position: Sitting, Cuff Size: Adult)   Pulse 81   Ht 162.6 cm (64\")   Wt 92.1 kg (203 lb)   SpO2 99%   BMI 34.84 kg/m²      Physical Exam  Vitals reviewed.   Constitutional:       General: She is not in acute distress.     Appearance: She is obese.   HENT:      Head: Normocephalic.      Nose: Nose normal.      Mouth/Throat:      Comments: Poor dentition  Eyes:      Conjunctiva/sclera: Conjunctivae normal.   Cardiovascular:      Rate and Rhythm: Normal rate.   Pulmonary:      Effort: Pulmonary effort is normal.   Abdominal:      Tenderness: " There is no guarding.   Musculoskeletal:         General: No deformity.   Skin:     General: Skin is warm and dry.   Neurological:      Mental Status: She is alert and oriented to person, place, and time.   Psychiatric:         Mood and Affect: Mood normal.         Behavior: Behavior normal.        Labs and images reviewed as noted in the HPI    Assessment and plan:    Diagnoses and all orders for this visit:    1. Type 2 diabetes mellitus with both eyes affected by mild nonproliferative retinopathy without macular edema, with long-term current use of insulin (Primary)  Assessment & Plan:  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  -Recently blood glucoses are slightly more elevated, recommend increasing 70/30 to 28 units with breakfast and 54 units with supper  -Continue Ozempic 2.0 mg weekly  -Recommend being active as able  -I would still like for her to stop drinking regular soda in the evening, if so she will need to cut back on her supper insulin dose  -Continue Dexcom CGM  -Continue benazepril; blood pressure today 124/76     DM Health Maintenance:  Ophtho: done 11/2023 and reports nonproliferative retinopathy that is stable (see media tab)   Monofilament / Foot exam: abnormal, completed 6/12/2024, no reported foot sores  Lipids/Statin: Continue statin and Zetia with last FLP showing LDL 87 on 11/14/2023  RACHAEL: Negative on 11/21/2023  TSH: 0.898 done 7/8/2024  Aspirin: not taking     Orders:  -     POC Glycosylated Hemoglobin (Hb A1C)  -     insulin aspart prot & aspart (NovoLOG 70/30 FlexPen ReliOn) (70-30) 100 UNIT/ML suspension pen-injector injection; Inject 28u under the skin before breakfast and 54u under the skin before dinner, titrate dose as directed for max daily dose 100u  Dispense: 30 mL; Refill: 5  -     Insulin Pen Needle (B-D ULTRAFINE III SHORT PEN) 31G X 8 MM misc; Use as directed twice daily   Dispense: 200 each; Refill: 3  -     Continuous Glucose Transmitter (Dexcom G6 Transmitter) misc; Use 1 each Every 3 (Three) Months.  Dispense: 1 each; Refill: 3  -     Continuous Glucose Sensor (Dexcom G6 Sensor); Use Every 10 (Ten) Days.  Dispense: 9 each; Refill: 3  -     Comprehensive Metabolic Panel; Future  -     CBC (No Diff); Future  -     Microalbumin / Creatinine Urine Ratio - Urine, Clean Catch; Future  -     Semaglutide, 2 MG/DOSE, (Ozempic, 2 MG/DOSE,) 8 MG/3ML solution pen-injector; Inject 2 mg under the skin into the appropriate area as directed 1 (One) Time Per Week.  Dispense: 9 mL; Refill: 3  -     CBC (No Diff)  -     Comprehensive Metabolic Panel    2. Mixed hyperlipidemia  Assessment & Plan:  -Check fasting lipid panel today  -For now continue atorvastatin 80 mg daily and ezetimibe 10 mg daily  -Further adjustments pending results of labs    Orders:  -     Lipid Panel; Future  -     Lipid Panel         Return in about 3 months (around 1/16/2025) for T2DM.       Please note that portions of this note may have been completed with a voice recognition program. Efforts were made to edit the dictations, but occasionally words are mistranscribed.     Electronically signed by: Kyle S Rosenstein, MD   Addendum  Labs 10/16/2024  CMP grossly stable  CBC stable  Urine microalbumin negative  Total cholesterol 187, HDL 58,  and triglycerides 151  Continue atorvastatin 80 mg daily and ezetimibe 10 mg daily, will discuss the addition of a PCSK9 inhibitor at next visit due to persistent elevation of LDL while taking high intensity statin and ezetimibe    Results mailed to patient

## 2024-10-16 NOTE — ASSESSMENT & PLAN NOTE
-Check fasting lipid panel today  -For now continue atorvastatin 80 mg daily and ezetimibe 10 mg daily  -Further adjustments pending results of labs  
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  -Recently blood glucoses are slightly more elevated, recommend increasing 70/30 to 28 units with breakfast and 54 units with supper  -Continue Ozempic 2.0 mg weekly  -Recommend being active as able  -I would still like for her to stop drinking regular soda in the evening, if so she will need to cut back on her supper insulin dose  -Continue Dexcom CGM  -Continue benazepril; blood pressure today 124/76     DM Health Maintenance:  Ophtho: done 11/2023 and reports nonproliferative retinopathy that is stable (see media tab)   Monofilament / Foot exam: abnormal, completed 6/12/2024, no reported foot sores  Lipids/Statin: Continue statin and Zetia with last FLP showing LDL 87 on 11/14/2023  RACHAEL: Negative on 11/21/2023  TSH: 0.898 done 7/8/2024  Aspirin: not taking   
Hx of Autism.

## 2024-10-17 LAB
ALBUMIN SERPL-MCNC: 4.5 G/DL (ref 3.9–4.9)
ALBUMIN/CREAT UR: <9 MG/G CREAT (ref 0–29)
ALP SERPL-CCNC: 97 IU/L (ref 44–121)
ALT SERPL-CCNC: 13 IU/L (ref 0–32)
AST SERPL-CCNC: 21 IU/L (ref 0–40)
BILIRUB SERPL-MCNC: 0.3 MG/DL (ref 0–1.2)
BUN SERPL-MCNC: 11 MG/DL (ref 8–27)
BUN/CREAT SERPL: 13 (ref 12–28)
CALCIUM SERPL-MCNC: 9.5 MG/DL (ref 8.7–10.3)
CHLORIDE SERPL-SCNC: 104 MMOL/L (ref 96–106)
CHOLEST SERPL-MCNC: 187 MG/DL (ref 100–199)
CO2 SERPL-SCNC: 17 MMOL/L (ref 20–29)
CREAT SERPL-MCNC: 0.87 MG/DL (ref 0.57–1)
CREAT UR-MCNC: 34.5 MG/DL
EGFRCR SERPLBLD CKD-EPI 2021: 75 ML/MIN/1.73
ERYTHROCYTE [DISTWIDTH] IN BLOOD BY AUTOMATED COUNT: 13 % (ref 11.7–15.4)
GLOBULIN SER CALC-MCNC: 3.2 G/DL (ref 1.5–4.5)
GLUCOSE SERPL-MCNC: ABNORMAL MG/DL
HCT VFR BLD AUTO: 42.9 % (ref 34–46.6)
HDLC SERPL-MCNC: 58 MG/DL
HGB BLD-MCNC: 14.2 G/DL (ref 11.1–15.9)
LDLC SERPL CALC-MCNC: 103 MG/DL (ref 0–99)
MCH RBC QN AUTO: 31.1 PG (ref 26.6–33)
MCHC RBC AUTO-ENTMCNC: 33.1 G/DL (ref 31.5–35.7)
MCV RBC AUTO: 94 FL (ref 79–97)
MICROALBUMIN UR-MCNC: <3 UG/ML
PLATELET # BLD AUTO: 255 X10E3/UL (ref 150–450)
POTASSIUM SERPL-SCNC: ABNORMAL MMOL/L
PROT SERPL-MCNC: 7.7 G/DL (ref 6–8.5)
RBC # BLD AUTO: 4.57 X10E6/UL (ref 3.77–5.28)
SODIUM SERPL-SCNC: 142 MMOL/L (ref 134–144)
TRIGL SERPL-MCNC: 151 MG/DL (ref 0–149)
VLDLC SERPL CALC-MCNC: 26 MG/DL (ref 5–40)
WBC # BLD AUTO: 7 X10E3/UL (ref 3.4–10.8)

## 2024-10-17 RX ORDER — EZETIMIBE 10 MG/1
10 TABLET ORAL
Qty: 90 TABLET | Refills: 3 | Status: SHIPPED | OUTPATIENT
Start: 2024-10-17

## 2024-10-17 RX ORDER — ATORVASTATIN CALCIUM 80 MG/1
80 TABLET, FILM COATED ORAL DAILY
Qty: 90 TABLET | Refills: 1 | Status: SHIPPED | OUTPATIENT
Start: 2024-10-17 | End: 2025-04-15

## 2024-12-02 DIAGNOSIS — E11.3293 TYPE 2 DIABETES MELLITUS WITH BOTH EYES AFFECTED BY MILD NONPROLIFERATIVE RETINOPATHY WITHOUT MACULAR EDEMA, WITH LONG-TERM CURRENT USE OF INSULIN: ICD-10-CM

## 2024-12-02 DIAGNOSIS — Z79.4 TYPE 2 DIABETES MELLITUS WITH BOTH EYES AFFECTED BY MILD NONPROLIFERATIVE RETINOPATHY WITHOUT MACULAR EDEMA, WITH LONG-TERM CURRENT USE OF INSULIN: ICD-10-CM

## 2024-12-02 RX ORDER — PROCHLORPERAZINE 25 MG/1
1 SUPPOSITORY RECTAL
Qty: 1 EACH | Refills: 1 | Status: SHIPPED | OUTPATIENT
Start: 2024-12-02

## 2024-12-02 NOTE — TELEPHONE ENCOUNTER
Rx Refill Note  Requested Prescriptions     Pending Prescriptions Disp Refills    Continuous Glucose Transmitter (Dexcom G6 Transmitter) misc 1 each 3     Sig: Use 1 each Every 3 (Three) Months.      Last office visit with prescribing clinician: 10/16/2024     Next office visit with prescribing clinician: 4/2/2025

## 2024-12-02 NOTE — TELEPHONE ENCOUNTER
Caller: Merari Perezly    Relationship: Self    Best call back number: [phone]    Requested Prescriptions:   Requested Prescriptions     Pending Prescriptions Disp Refills    Continuous Glucose Transmitter (Dexcom G6 Transmitter) misc 1 each 3     Sig: Use 1 each Every 3 (Three) Months.        Pharmacy where request should be sent:  [facility] [id number] [address] AT [facility] [address] - [phone] [facility] [phone] FX [[id number]]     Last office visit with prescribing clinician: 10/16/2024   Last telemedicine visit with prescribing clinician: Visit date not found   Next office visit with prescribing clinician: 4/2/2025     Additional details provided by patient:     Does the patient have less than a 3 day supply:  [] Yes  [] No    Would you like a call back once the refill request has been completed: [] Yes [] No    If the office needs to give you a call back, can they leave a voicemail: [] Yes [] No    Radha Cowan Rep   12/02/24 09:45 EST

## 2025-01-02 DIAGNOSIS — I10 PRIMARY HYPERTENSION: ICD-10-CM

## 2025-01-02 RX ORDER — AMLODIPINE AND BENAZEPRIL HYDROCHLORIDE 5; 40 MG/1; MG/1
1 CAPSULE ORAL DAILY
Qty: 30 CAPSULE | Refills: 0 | Status: SHIPPED | OUTPATIENT
Start: 2025-01-02

## 2025-01-07 DIAGNOSIS — J30.9 ALLERGIC RHINITIS, UNSPECIFIED SEASONALITY, UNSPECIFIED TRIGGER: ICD-10-CM

## 2025-01-07 DIAGNOSIS — R61 NIGHT SWEATS: ICD-10-CM

## 2025-01-07 DIAGNOSIS — G62.0 CHEMOTHERAPY-INDUCED NEUROPATHY: ICD-10-CM

## 2025-01-07 DIAGNOSIS — E53.8 VITAMIN B12 DEFICIENCY: ICD-10-CM

## 2025-01-07 DIAGNOSIS — T45.1X5A CHEMOTHERAPY-INDUCED NEUROPATHY: ICD-10-CM

## 2025-01-07 RX ORDER — LEVOCETIRIZINE DIHYDROCHLORIDE 5 MG/1
5 TABLET, FILM COATED ORAL EVERY EVENING
Qty: 90 TABLET | Refills: 3 | Status: SHIPPED | OUTPATIENT
Start: 2025-01-07

## 2025-01-07 RX ORDER — GABAPENTIN 400 MG/1
400 CAPSULE ORAL
Qty: 30 CAPSULE | Refills: 0 | Status: SHIPPED | OUTPATIENT
Start: 2025-01-07

## 2025-01-07 RX ORDER — CYCLOBENZAPRINE HCL 10 MG
10 TABLET ORAL NIGHTLY
Qty: 90 TABLET | Refills: 3 | Status: SHIPPED | OUTPATIENT
Start: 2025-01-07

## 2025-01-07 RX ORDER — CARBAMAZEPINE 100 MG/1
100 TABLET, CHEWABLE ORAL 3 TIMES DAILY
Qty: 90 TABLET | Refills: 0 | Status: SHIPPED | OUTPATIENT
Start: 2025-01-07

## 2025-01-07 RX ORDER — FOLIC ACID 1 MG/1
1 TABLET ORAL DAILY
Qty: 90 TABLET | Refills: 3 | Status: SHIPPED | OUTPATIENT
Start: 2025-01-07

## 2025-01-07 RX ORDER — CLONIDINE HYDROCHLORIDE 0.1 MG/1
0.1 TABLET ORAL
Qty: 90 TABLET | Refills: 3 | Status: SHIPPED | OUTPATIENT
Start: 2025-01-07

## 2025-01-07 NOTE — TELEPHONE ENCOUNTER
Caller: Tara Perez    Relationship: Self    Best call back number: [phone]     Requested Prescriptions:   Requested Prescriptions     Pending Prescriptions Disp Refills    gabapentin (NEURONTIN) 400 MG capsule 90 capsule 1     Sig: Take 1 capsule by mouth every night at bedtime.    carBAMazepine (TEGretol) 100 MG chewable tablet 270 tablet 1     Sig: Chew 1 tablet 3 (Three) Times a Day.    cyclobenzaprine (FLEXERIL) 10 MG tablet 90 tablet 1     Sig: Take 1 tablet by mouth Every Night.    cloNIDine (CATAPRES) 0.1 MG tablet 90 tablet 1     Sig: Take 1 tablet by mouth every night at bedtime.    folic acid (FOLVITE) 1 MG tablet 90 tablet 3     Sig: Take 1 tablet by mouth Daily.    levocetirizine (XYZAL) 5 MG tablet 90 tablet 3     Sig: Take 1 tablet by mouth Every Evening.        Pharmacy where request should be sent: [facility] [id number] [facility][1] [place] - [address] [facility] AT [facility] [address] [facility] [phone] [facility] [phone] FX     Last office visit with prescribing clinician: 7/8/2024   Last telemedicine visit with prescribing clinician: Visit date not found   Next office visit with prescribing clinician: 1/28/2025     Additional details provided by patient:     Does the patient have less than a 3 day supply:  [x] Yes  [] No    Would you like a call back once the refill request has been completed: [] Yes [x] No    If the office needs to give you a call back, can they leave a voicemail: [] Yes [x] No    Radha Mo Rep   01/07/25 10:44 EST      None known

## 2025-02-04 DIAGNOSIS — G62.0 CHEMOTHERAPY-INDUCED NEUROPATHY: ICD-10-CM

## 2025-02-04 DIAGNOSIS — T45.1X5A CHEMOTHERAPY-INDUCED NEUROPATHY: ICD-10-CM

## 2025-02-05 RX ORDER — CARBAMAZEPINE 100 MG/1
TABLET, CHEWABLE ORAL
Qty: 90 TABLET | Refills: 0 | Status: SHIPPED | OUTPATIENT
Start: 2025-02-05 | End: 2025-02-07 | Stop reason: SDUPTHER

## 2025-02-07 ENCOUNTER — OFFICE VISIT (OUTPATIENT)
Dept: FAMILY MEDICINE CLINIC | Facility: CLINIC | Age: 65
End: 2025-02-07
Payer: MEDICAID

## 2025-02-07 VITALS
OXYGEN SATURATION: 97 % | WEIGHT: 202 LBS | HEART RATE: 82 BPM | HEIGHT: 64 IN | BODY MASS INDEX: 34.49 KG/M2 | DIASTOLIC BLOOD PRESSURE: 86 MMHG | SYSTOLIC BLOOD PRESSURE: 134 MMHG

## 2025-02-07 DIAGNOSIS — Z79.4 TYPE 2 DIABETES MELLITUS WITH BOTH EYES AFFECTED BY MILD NONPROLIFERATIVE RETINOPATHY WITHOUT MACULAR EDEMA, WITH LONG-TERM CURRENT USE OF INSULIN: ICD-10-CM

## 2025-02-07 DIAGNOSIS — I10 PRIMARY HYPERTENSION: ICD-10-CM

## 2025-02-07 DIAGNOSIS — Z00.00 GENERAL MEDICAL EXAM: Primary | ICD-10-CM

## 2025-02-07 DIAGNOSIS — E53.8 VITAMIN B12 DEFICIENCY: ICD-10-CM

## 2025-02-07 DIAGNOSIS — E11.3293 TYPE 2 DIABETES MELLITUS WITH BOTH EYES AFFECTED BY MILD NONPROLIFERATIVE RETINOPATHY WITHOUT MACULAR EDEMA, WITH LONG-TERM CURRENT USE OF INSULIN: ICD-10-CM

## 2025-02-07 DIAGNOSIS — E78.2 MIXED HYPERLIPIDEMIA: ICD-10-CM

## 2025-02-07 DIAGNOSIS — E55.9 VITAMIN D DEFICIENCY: ICD-10-CM

## 2025-02-07 DIAGNOSIS — T45.1X5A CHEMOTHERAPY-INDUCED NEUROPATHY: ICD-10-CM

## 2025-02-07 DIAGNOSIS — Z79.899 HIGH RISK MEDICATION USE: ICD-10-CM

## 2025-02-07 DIAGNOSIS — G62.0 CHEMOTHERAPY-INDUCED NEUROPATHY: ICD-10-CM

## 2025-02-07 PROCEDURE — 3075F SYST BP GE 130 - 139MM HG: CPT | Performed by: PHYSICIAN ASSISTANT

## 2025-02-07 PROCEDURE — 1160F RVW MEDS BY RX/DR IN RCRD: CPT | Performed by: PHYSICIAN ASSISTANT

## 2025-02-07 PROCEDURE — 99396 PREV VISIT EST AGE 40-64: CPT | Performed by: PHYSICIAN ASSISTANT

## 2025-02-07 PROCEDURE — 1126F AMNT PAIN NOTED NONE PRSNT: CPT | Performed by: PHYSICIAN ASSISTANT

## 2025-02-07 PROCEDURE — 3079F DIAST BP 80-89 MM HG: CPT | Performed by: PHYSICIAN ASSISTANT

## 2025-02-07 PROCEDURE — 1159F MED LIST DOCD IN RCRD: CPT | Performed by: PHYSICIAN ASSISTANT

## 2025-02-07 RX ORDER — GABAPENTIN 400 MG/1
400 CAPSULE ORAL
Qty: 90 CAPSULE | Refills: 1 | Status: SHIPPED | OUTPATIENT
Start: 2025-02-07

## 2025-02-07 RX ORDER — AMLODIPINE AND BENAZEPRIL HYDROCHLORIDE 5; 40 MG/1; MG/1
1 CAPSULE ORAL DAILY
Qty: 90 CAPSULE | Refills: 3 | Status: SHIPPED | OUTPATIENT
Start: 2025-02-07

## 2025-02-07 RX ORDER — CYCLOBENZAPRINE HCL 10 MG
10 TABLET ORAL NIGHTLY
Qty: 90 TABLET | Refills: 3 | Status: CANCELLED | OUTPATIENT
Start: 2025-02-07

## 2025-02-07 RX ORDER — CARBAMAZEPINE 100 MG/1
100 TABLET, CHEWABLE ORAL 3 TIMES DAILY
Qty: 270 TABLET | Refills: 1 | Status: SHIPPED | OUTPATIENT
Start: 2025-02-07

## 2025-02-07 NOTE — LETTER
Controlled Substance Prescribing Agreement          I, Tara Perez [PATIENT],  1960 [] a patient of  Rayne Santiago PA   [PROVIDER] at [facility] [PRACTICE], have been informed that  individuals who are prescribed certain Controlled Substances including, but not limited to, narcotic pain medicines, stimulants, benzodiazepine tranquilizers, and barbiturate sedatives, can abuse those substances or may allow abuse by others, and have some risk of developing an addictive disorder or suffering a relapse of a prior addiction. Therefore, I have been informed that it is necessary to observe strict rules pertaining to their use, and I agree to follow the terms and procedures described in this Agreement as consideration for, and as a condition of, the willingness of the physician whose signature appears below to consider prescribing or to continue prescribing Controlled Substances to treat my pain.     1. I will inform my physician of any current or past substance abuse, or any current or past substance abuse of any immediate member of my immediate family.     2. I agree that I may be subject to a voluntary evaluation by psychologists and/or psychiatrists, possibly at my own expense, before any Controlled Substances will be prescribed to me. I agree that the need to be evaluated by psychologists and/or psychiatrists may be revisited every three (3) to six (6) months thereafter while taking the medication.     3. All Controlled Substances must come from a provider in the PROVIDER’S PRACTICE. My Controlled Substances will come from the PROVIDER whose signature appears below, or during his or her absence, by the covering provider, unless specific written authorization is obtained from the office for an exception.     4. I will obtain all Controlled Substances from the same pharmacy. Should the need arise to change pharmacies, I will inform the PROVIDER’S office.     5. I will inform  the PROVIDER’S office of any new medications or medical conditions, and of any adverse effects I experience from any of the medications that I take.     6. I will inform my other health care providers that I am taking the Controlled Substances listed above, and of the existence of this Agreement. In the event of an emergency, I will provide the foregoing information to emergency department providers.     7. I agree that my prescribing PROVIDER has permission to discuss all diagnostic and treatment details with other health care providers, pharmacists, or other professionals who provide my health care regarding my use of Controlled Substances for purposes of maintaining accountability.     8. I will not allow anyone else to have, use sell, or otherwise have access to these medications. The sharing of medications with anyone is absolutely forbidden and is against the law.         9. I understand that Controlled Substances may be hazardous or lethal to a person who is not tolerant to their effects, especially a child, and that I must keep them out of reach of such people for their own safety.     10. I understand that tampering with a written prescription is a felony and I will not change or tamper with the PROVIDER’S written prescription.     11. I am aware that attempting to obtain a Controlled Substance under false pretenses is illegal.     12. I agree not to alter my medication in any way, and I will take my medication whole, and it will not be broken, chewed, crushed, injected, or snorted.     13. I will take my medication as instructed and prescribed, and I will not exceed the maximum prescribed dose. Any change in dosage must be approved by the PROVIDER or a physician within the PRACTICE.     14. I understand that these drugs should not be stopped abruptly, as withdrawal syndromes may develop.     15. I will cooperate with unannounced urine or serum toxicology screenings as may be requested, as well as any  random pill counts of medication by the PROVIDER. Failure to comply may result in termination of the PROVIDER-patient relationship.     16. I understand that the presence of unauthorized and/or illegal substances in the screenings described in the paragraph above may prompt referral for assessment for a substance abuse disorder or termination of the PROVIDER-patient relationship.     17. I understand that medications may not be replaced if they are lost, damaged, or stolen. If any of these situations arise that cause me to request an early refill of my medication, a copy of a filed police report or a statement from me explaining the circumstances may be required before additional prescriptions are considered. If I request an early refill secondary to lost, damaged, or stolen prescriptions twice within a year, I may be discharged from the practice.     18. I understand that a prescription may be given early if the PROVIDER or the patient will be out of town when the refill is due. These prescriptions will contain instructions to the pharmacist that the prescriptions(s) may not be filled prior to the appropriate date.     19. If the responsible legal authorities have questions concerning my treatment, as may occur, for example, if I obtained medication at several pharmacies, all confidentiality is waived, and these authorities may be given full access to my full records of Controlled Substances administration.     20. I will keep my scheduled appointments in order to receive medication renewals. If I need to cancel my appointment, I will do so a minimum of twenty-four (24) hours before it is scheduled.     21. I understand that I may be asked to bring my medications in their original container to the PROVIDER’s office while I am on controlled medication.     22. Refills generally will not be given over the phone, after office hours, during the weekends, and on holidays.     23. I understand that any medical treatment  is initially a trial, with the goal of treatment being to improve the quality of life and ability to function and/or work. These parameters will be assessed periodically to determine the benefits of continued therapy, and continued prescription is contingent on whether my physician believes that the medication usage benefits me. I will comply with all treatments as outlined by the PROVIDER.     24. I have been explained the risks and potential benefits of these therapies, including, but not limited to, psychological addiction, physical dependence, withdrawal and over dosage.     25. I understand that failure to adhere to these policies and/or failure to comply with the PROVIDER’S treatment plan may result in cessation of therapy with Controlled Substance prescribing by the PROVIDER or referral for further specialty assessment, as well as possible discharge from the PRACTICE.     26. I, the undersigned patient, attest that the foregoing was discussed with me, and that I have read, fully understand, and agree to all of the above requirements and instructions. I affirm that I have the full right and power to sign and be bound by this  Agreement.         Date:  __________________________________________    Time:  __________________________________________    Patient Printed Name:  _____________________________    Patient Signature:  ________________________________           Date:  __________________________________________    Time:  __________________________________________    Provider Signature:  _______________________________

## 2025-02-07 NOTE — ASSESSMENT & PLAN NOTE
Controlled substance was refilled today. Informed consent and treatment agreement signed. Educational materials provided and discussed on appropriate use, disposal and storage of medications. Her UNA was appropriate. Written information regarding appropriate use, storage and disposal of medication was given and discussed. Point of care UDS was performed today.

## 2025-02-07 NOTE — PROGRESS NOTES
Annual Physical-Preventive Visit     Patient Name: Tara Perez  : 1960   MRN: [id number]     Chief Complaint:    Chief Complaint   Patient presents with    Hypertension    Hyperlipidemia    Annual Exam       History of Present Illness: Tara Perez is a 64 y.o. female who is here today for their annual health maintenance and physical.  She also needs to follow-up on her high blood pressure and hypertension.  Endocrinology follows her diabetes.  She says she thinks the diabetes has gone to her feet.  She has neuropathy due to chemotherapy, she says feet stay cold and she has little sensation.  She is fearful of amputation saying that her father's feet and lower legs both were amputated before the end of his life due to diabetes.  She says she has to have her feet.    Subjective      Review of Systems:   Review of Systems   Constitutional:  Negative for fatigue and fever.   HENT:  Negative for hearing loss.    Eyes:  Negative for visual disturbance.   Respiratory:  Negative for shortness of breath.    Cardiovascular:  Positive for leg swelling. Negative for chest pain and palpitations.   Gastrointestinal:  Negative for abdominal pain, blood in stool, constipation and diarrhea.   Genitourinary:  Negative for difficulty urinating.   Musculoskeletal:  Negative for arthralgias and myalgias.   Skin:  Negative for rash.   Allergic/Immunologic: Negative for immunocompromised state.   Psychiatric/Behavioral:  Negative for dysphoric mood. The patient is not nervous/anxious.         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   Surgical History: has a past surgical history that includes Appendectomy; Tonsillectomy and adenoidectomy; and Mastectomy.   Family History: family history includes Diabetes in her brother, father, and sister.    Social History: reports that she quit smoking about 33 years ago. Her smoking use included cigarettes. She started smoking about 50 years ago. She has a 17 pack-year smoking history. She has been exposed to tobacco smoke. She has never used smokeless tobacco. She reports that she does not drink alcohol and does not use drugs.    Health Maintenance   Topic Date Due    DIABETIC EYE EXAM  11/03/2024    ANNUAL PHYSICAL  11/21/2024    HEMOGLOBIN A1C  04/16/2025    INFLUENZA VACCINE  03/31/2025 (Originally 7/1/2024)    COLORECTAL CANCER SCREENING  02/07/2026 (Originally 1960)    LIPID PANEL  10/16/2025    BMI FOLLOWUP  10/16/2025    DIABETIC FOOT EXAM  02/07/2026    PAP SMEAR  11/21/2026    TDAP/TD VACCINES (2 - Td or Tdap) 11/21/2033    HEPATITIS C SCREENING  Completed    COVID-19 Vaccine  Completed    Pneumococcal Vaccine 0-64  Completed    MAMMOGRAM  Discontinued    URINE MICROALBUMIN  Discontinued        Immunization History   Administered Date(s) Administered    ABRYSVO (RSV, 60+ or pregnant women 32-36 wks) 03/27/2024    COVID-19 (MODERNA) 12YRS+ (SPIKEVAX) 09/06/2024    COVID-19 (MODERNA) 1st,2nd,3rd Dose Monovalent 04/20/2021, 05/18/2021, 11/03/2021, 04/01/2022    COVID-19 (MODERNA) BIVALENT 12+YRS 10/19/2022    COVID-19 (PFIZER) 12YRS+ (COMIRNATY) 03/15/2024    Pneumococcal Conjugate 20-Valent (PCV20) 11/21/2023, 03/15/2024    Shingrix 03/27/2024    Tdap 11/21/2023       Medications:     Current Outpatient Medications:     amLODIPine-benazepril (LOTREL) 5-40 MG per capsule, Take 1 capsule by mouth Daily., Disp: 90 capsule, Rfl: 3    atorvastatin (LIPITOR) 80 MG tablet, Take 1 tablet by mouth Daily for 180 days., Disp: 90 tablet, Rfl: 1    Blood Glucose Monitoring Suppl device, Use as directed to check blood sugar dx e11.65 please dispense insurance preferred, Disp: 1 each, Rfl: 0    carBAMazepine (TEGretol) 100 MG chewable tablet, Chew 1 tablet 3 (Three) Times a Day., Disp: 270 tablet, Rfl: 1    cloNIDine  (CATAPRES) 0.1 MG tablet, Take 1 tablet by mouth every night at bedtime., Disp: 90 tablet, Rfl: 3    Continuous Blood Gluc  (Dexcom G6 ) device, 1 each Take As Directed., Disp: 1 each, Rfl: 0    Continuous Glucose Sensor (Dexcom G6 Sensor), Use Every 10 (Ten) Days., Disp: 9 each, Rfl: 3    Continuous Glucose Transmitter (Dexcom G6 Transmitter) misc, Use 1 each Every 3 (Three) Months., Disp: 1 each, Rfl: 1    cyclobenzaprine (FLEXERIL) 10 MG tablet, Take 1 tablet by mouth Every Night., Disp: 90 tablet, Rfl: 3    Easy Touch Lancets 30G/Twist misc, Use as directed up to 4 times daily, Disp: 100 each, Rfl: 11    ezetimibe (ZETIA) 10 MG tablet, Take 1 tablet by mouth every night at bedtime., Disp: 90 tablet, Rfl: 3    folic acid (FOLVITE) 1 MG tablet, Take 1 tablet by mouth Daily., Disp: 90 tablet, Rfl: 3    gabapentin (NEURONTIN) 400 MG capsule, Take 1 capsule by mouth every night at bedtime., Disp: 90 capsule, Rfl: 1    Glucose Blood (Blood Glucose Test) strip, Use to check blood sugar 2 times a day dx e11.65 on insulin please dispense strips with new meter, Disp: 100 each, Rfl: 11    insulin aspart prot & aspart (NovoLOG 70/30 FlexPen ReliOn) (70-30) 100 UNIT/ML suspension pen-injector injection, Inject 28u under the skin before breakfast and 54u under the skin before dinner, titrate dose as directed for max daily dose 100u, Disp: 30 mL, Rfl: 5    Insulin Pen Needle (B-D ULTRAFINE III SHORT PEN) 31G X 8 MM misc, Use as directed twice daily, Disp: 200 each, Rfl: 3    Lancets 33G misc, Use 1 each 2 (Two) Times a Day. Dx e11.65 on insulin, Disp: 100 each, Rfl: 11    levocetirizine (XYZAL) 5 MG tablet, Take 1 tablet by mouth Every Evening., Disp: 90 tablet, Rfl: 3    OneTouch Ultra test strip, Use as instructed to test twice daily, Disp: 100 each, Rfl: 11    Semaglutide, 2 MG/DOSE, (Ozempic, 2 MG/DOSE,) 8 MG/3ML solution pen-injector, Inject 2 mg under the skin into the appropriate area as directed 1 (One)  "Time Per Week., Disp: 9 mL, Rfl: 3    Allergies:   Allergies   Allergen Reactions    Shingrix [Zoster Vac Recomb Adjuvanted] Itching    Jardiance [Empagliflozin] Itching    Penicillins Rash       Depression: PHQ-2 Depression Screening  Little interest or pleasure in doing things?     Feeling down, depressed, or hopeless?     PHQ-2 Total Score        Predictive Model Details   No score data available for Risk of Fall         Objective     Physical Exam:  Vital Signs:   Vitals:    02/07/25 1305   BP: 134/86   BP Location: Left arm   Patient Position: Sitting   Cuff Size: Large Adult   Pulse: 82   SpO2: 97%   Weight: 91.6 kg (202 lb)   Height: 162.6 cm (64\")   PainSc: 0-No pain     Body mass index is 34.67 kg/m².           Physical Exam  Constitutional:       Appearance: Normal appearance. She is obese.   Cardiovascular:      Rate and Rhythm: Normal rate and regular rhythm.      Pulses:           Dorsalis pedis pulses are detected w/ Doppler on the right side and detected w/ Doppler on the left side.        Posterior tibial pulses are detected w/ Doppler on the right side and detected w/ Doppler on the left side.   Pulmonary:      Effort: Pulmonary effort is normal.      Breath sounds: Normal breath sounds.   Musculoskeletal:      Cervical back: Normal range of motion and neck supple.      Right foot: No deformity.      Left foot: No deformity.   Feet:      Right foot:      Protective Sensation: 2 sites tested.  10 sites sensed.      Skin integrity: Skin integrity normal. No warmth.      Toenail Condition: Right toenails are normal.      Left foot:      Protective Sensation: 4 sites tested.  10 sites sensed.      Skin integrity: Skin integrity normal. No warmth.      Toenail Condition: Left toenails are normal.      Comments: Diabetic Foot Exam Performed and Monofilament Test Performed, slow capillary refill, toes are dusky.      Neurological:      Mental Status: She is alert and oriented to person, place, and time. "   Psychiatric:         Mood and Affect: Mood normal.         Behavior: Behavior normal.         Thought Content: Thought content normal.         Judgment: Judgment normal.         Procedures    Assessment / Plan      Assessment/Plan:   Diagnoses and all orders for this visit:    1. General medical exam (Primary)  -     Comprehensive Metabolic Panel  -     Vitamin B12  -     Folate  -     Lipid Panel  -     Hemoglobin A1c  -     TSH  -     T4, Free  -     CBC Auto Differential  -     Vitamin D,25-Hydroxy  -     CK    2. Chemotherapy-induced neuropathy  Assessment & Plan:  Controlled substance was refilled today. Informed consent and treatment agreement signed. Educational materials provided and discussed on appropriate use, disposal and storage of medications. Her UNA was appropriate. Written information regarding appropriate use, storage and disposal of medication was given and discussed. Point of care UDS was performed today.     Orders:  -     gabapentin (NEURONTIN) 400 MG capsule; Take 1 capsule by mouth every night at bedtime.  Dispense: 90 capsule; Refill: 1  -     carBAMazepine (TEGretol) 100 MG chewable tablet; Chew 1 tablet 3 (Three) Times a Day.  Dispense: 270 tablet; Refill: 1  -     Vitamin B12; Future  -     Folate; Future  -     TSH; Future  -     T4, Free; Future    3. Primary hypertension  Assessment & Plan:  Stable, continue current medication.    Orders:  -     amLODIPine-benazepril (LOTREL) 5-40 MG per capsule; Take 1 capsule by mouth Daily.  Dispense: 90 capsule; Refill: 3    4. Vitamin D deficiency  -     Vitamin D,25-Hydroxy  -     Vitamin D,25-Hydroxy; Future    5. Vitamin B12 deficiency  -     Vitamin B12  -     Folate    6. Mixed hyperlipidemia  Assessment & Plan:   Followed by endocrinology, check labs today.    Orders:  -     Lipid Panel  -     TSH  -     T4, Free  -     Lipid Panel; Future    7. 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  Was able to find good pulses with Doppler.  Feet are cold and dusky with slow capillary refill but the skin otherwise looks healthy and toenails are pretty normal.  Managing her lipids, hypertension and diabetes and taking good care of her feet is the best way to avoid amputations.  Patient says eye exam up-to-date, will request copy of report.    Orders:  -     Hemoglobin A1c  -     Microalbumin / Creatinine Urine Ratio - Urine, Clean Catch  -     Hemoglobin A1c; Future    8. High risk medication use  -     Comprehensive Metabolic Panel  -     CBC Auto Differential  -     CK  -     POC Urine Drug Screen [facility]  -     Comprehensive Metabolic Panel; Future  -     CBC Auto Differential; Future  -     CK; Future             Current Outpatient Medications:     amLODIPine-benazepril (LOTREL) 5-40 MG per capsule, Take 1 capsule by mouth Daily., Disp: 90 capsule, Rfl: 3    atorvastatin (LIPITOR) 80 MG tablet, Take 1 tablet by mouth Daily for 180 days., Disp: 90 tablet, Rfl: 1    Blood Glucose Monitoring Suppl device, Use as directed to check blood sugar dx e11.65 please dispense insurance preferred, Disp: 1 each, Rfl: 0    carBAMazepine (TEGretol) 100 MG chewable tablet, Chew 1 tablet 3 (Three) Times a Day., Disp: 270 tablet, Rfl: 1    cloNIDine (CATAPRES) 0.1 MG tablet, Take 1 tablet by mouth every night at bedtime., Disp: 90 tablet, Rfl: 3    Continuous Blood Gluc  (Dexcom G6 ) device, 1 each Take As Directed., Disp: 1 each, Rfl: 0    Continuous Glucose Sensor (Dexcom G6 Sensor), Use Every 10 (Ten) Days., Disp: 9 each, Rfl: 3    Continuous Glucose Transmitter (Dexcom G6 Transmitter) misc, Use 1 each Every 3 (Three) Months., Disp: 1 each, Rfl: 1    cyclobenzaprine (FLEXERIL) 10 MG tablet, Take 1 tablet by mouth Every Night., Disp: 90 tablet, Rfl: 3    Easy Touch Lancets 30G/Twist misc, Use as  directed up to 4 times daily, Disp: 100 each, Rfl: 11    ezetimibe (ZETIA) 10 MG tablet, Take 1 tablet by mouth every night at bedtime., Disp: 90 tablet, Rfl: 3    folic acid (FOLVITE) 1 MG tablet, Take 1 tablet by mouth Daily., Disp: 90 tablet, Rfl: 3    gabapentin (NEURONTIN) 400 MG capsule, Take 1 capsule by mouth every night at bedtime., Disp: 90 capsule, Rfl: 1    Glucose Blood (Blood Glucose Test) strip, Use to check blood sugar 2 times a day dx e11.65 on insulin please dispense strips with new meter, Disp: 100 each, Rfl: 11    insulin aspart prot & aspart (NovoLOG 70/30 FlexPen ReliOn) (70-30) 100 UNIT/ML suspension pen-injector injection, Inject 28u under the skin before breakfast and 54u under the skin before dinner, titrate dose as directed for max daily dose 100u, Disp: 30 mL, Rfl: 5    Insulin Pen Needle (B-D ULTRAFINE III SHORT PEN) 31G X 8 MM misc, Use as directed twice daily, Disp: 200 each, Rfl: 3    Lancets 33G misc, Use 1 each 2 (Two) Times a Day. Dx e11.65 on insulin, Disp: 100 each, Rfl: 11    levocetirizine (XYZAL) 5 MG tablet, Take 1 tablet by mouth Every Evening., Disp: 90 tablet, Rfl: 3    OneTouch Ultra test strip, Use as instructed to test twice daily, Disp: 100 each, Rfl: 11    Semaglutide, 2 MG/DOSE, (Ozempic, 2 MG/DOSE,) 8 MG/3ML solution pen-injector, Inject 2 mg under the skin into the appropriate area as directed 1 (One) Time Per Week., Disp: 9 mL, Rfl: 3    Follow Up:   Return in about 6 months (around 8/7/2025) for 30 minute med recheck with labs one week prior.    Healthcare Maintenance:   Counseling provided on healthy diet and exercise.  Tara Perez voices understanding and acceptance of this advice.  AVS with preventive healthcare tips printed for patient.     Rayne Santiago PA-C  [facility]      NOTE TO PATIENT: The 21st Century Cures Act makes medical notes like these available to patients in the interest of transparency. However, be advised this is a  medical document. It is intended as peer to peer communication. It is written in medical language and may contain abbreviations or verbiage that are unfamiliar. It may appear blunt or direct. Medical documents are intended to carry relevant information, facts as evident, and the clinical opinion of the practitioner.

## 2025-02-07 NOTE — ASSESSMENT & PLAN NOTE
Will check an A1c.  Was able to find good pulses with Doppler.  Feet are cold and dusky with slow capillary refill but the skin otherwise looks healthy and toenails are pretty normal.  Managing her lipids, hypertension and diabetes and taking good care of her feet is the best way to avoid amputations.  Patient says eye exam up-to-date, will request copy of report.

## 2025-02-08 LAB
25(OH)D3+25(OH)D2 SERPL-MCNC: 22.1 NG/ML (ref 30–100)
ALBUMIN SERPL-MCNC: 4.4 G/DL (ref 3.9–4.9)
ALBUMIN/CREAT UR: <7 MG/G CREAT (ref 0–29)
ALP SERPL-CCNC: 98 IU/L (ref 44–121)
ALT SERPL-CCNC: 12 IU/L (ref 0–32)
AST SERPL-CCNC: 19 IU/L (ref 0–40)
BASOPHILS # BLD AUTO: 0.1 X10E3/UL (ref 0–0.2)
BASOPHILS NFR BLD AUTO: 1 %
BILIRUB SERPL-MCNC: 0.3 MG/DL (ref 0–1.2)
BUN SERPL-MCNC: 8 MG/DL (ref 8–27)
BUN/CREAT SERPL: 10 (ref 12–28)
CALCIUM SERPL-MCNC: 9.3 MG/DL (ref 8.7–10.3)
CHLORIDE SERPL-SCNC: 102 MMOL/L (ref 96–106)
CHOLEST SERPL-MCNC: 171 MG/DL (ref 100–199)
CK SERPL-CCNC: 91 U/L (ref 32–182)
CO2 SERPL-SCNC: 24 MMOL/L (ref 20–29)
CREAT SERPL-MCNC: 0.78 MG/DL (ref 0.57–1)
CREAT UR-MCNC: 40.6 MG/DL
EGFRCR SERPLBLD CKD-EPI 2021: 85 ML/MIN/1.73
EOSINOPHIL # BLD AUTO: 0.4 X10E3/UL (ref 0–0.4)
EOSINOPHIL NFR BLD AUTO: 6 %
ERYTHROCYTE [DISTWIDTH] IN BLOOD BY AUTOMATED COUNT: 13 % (ref 11.7–15.4)
FOLATE SERPL-MCNC: >20 NG/ML
GLOBULIN SER CALC-MCNC: 3.1 G/DL (ref 1.5–4.5)
GLUCOSE SERPL-MCNC: 82 MG/DL (ref 70–99)
HBA1C MFR BLD: 6.3 % (ref 4.8–5.6)
HCT VFR BLD AUTO: 42.2 % (ref 34–46.6)
HDLC SERPL-MCNC: 55 MG/DL
HGB BLD-MCNC: 14.3 G/DL (ref 11.1–15.9)
IMM GRANULOCYTES # BLD AUTO: 0 X10E3/UL (ref 0–0.1)
IMM GRANULOCYTES NFR BLD AUTO: 0 %
LDLC SERPL CALC-MCNC: 85 MG/DL (ref 0–99)
LYMPHOCYTES # BLD AUTO: 2.6 X10E3/UL (ref 0.7–3.1)
LYMPHOCYTES NFR BLD AUTO: 36 %
MCH RBC QN AUTO: 30.8 PG (ref 26.6–33)
MCHC RBC AUTO-ENTMCNC: 33.9 G/DL (ref 31.5–35.7)
MCV RBC AUTO: 91 FL (ref 79–97)
MICROALBUMIN UR-MCNC: <3 UG/ML
MONOCYTES # BLD AUTO: 0.5 X10E3/UL (ref 0.1–0.9)
MONOCYTES NFR BLD AUTO: 7 %
NEUTROPHILS # BLD AUTO: 3.7 X10E3/UL (ref 1.4–7)
NEUTROPHILS NFR BLD AUTO: 50 %
PLATELET # BLD AUTO: 252 X10E3/UL (ref 150–450)
POTASSIUM SERPL-SCNC: 3.9 MMOL/L (ref 3.5–5.2)
PROT SERPL-MCNC: 7.5 G/DL (ref 6–8.5)
RBC # BLD AUTO: 4.65 X10E6/UL (ref 3.77–5.28)
SODIUM SERPL-SCNC: 140 MMOL/L (ref 134–144)
T4 FREE SERPL-MCNC: 0.78 NG/DL (ref 0.82–1.77)
TRIGL SERPL-MCNC: 186 MG/DL (ref 0–149)
TSH SERPL DL<=0.005 MIU/L-ACNC: 0.84 UIU/ML (ref 0.45–4.5)
VIT B12 SERPL-MCNC: 459 PG/ML (ref 232–1245)
VLDLC SERPL CALC-MCNC: 31 MG/DL (ref 5–40)
WBC # BLD AUTO: 7.2 X10E3/UL (ref 3.4–10.8)

## 2025-02-10 RX ORDER — ERGOCALCIFEROL 1.25 MG/1
50000 CAPSULE ORAL WEEKLY
Qty: 13 CAPSULE | Refills: 3 | Status: SHIPPED | OUTPATIENT
Start: 2025-02-10

## 2025-02-10 NOTE — PROGRESS NOTES
Labs stable except for low vitamin D.  Will send weekly high-dose vitamin D and repeat again in a few months.

## 2025-03-21 ENCOUNTER — TELEPHONE (OUTPATIENT)
Dept: ENDOCRINOLOGY | Facility: CLINIC | Age: 65
End: 2025-03-21
Payer: MEDICAID

## 2025-03-21 DIAGNOSIS — E11.3293 TYPE 2 DIABETES MELLITUS WITH BOTH EYES AFFECTED BY MILD NONPROLIFERATIVE RETINOPATHY WITHOUT MACULAR EDEMA, WITH LONG-TERM CURRENT USE OF INSULIN: ICD-10-CM

## 2025-03-21 DIAGNOSIS — Z79.4 TYPE 2 DIABETES MELLITUS WITH BOTH EYES AFFECTED BY MILD NONPROLIFERATIVE RETINOPATHY WITHOUT MACULAR EDEMA, WITH LONG-TERM CURRENT USE OF INSULIN: ICD-10-CM

## 2025-03-21 RX ORDER — PROCHLORPERAZINE 25 MG/1
1 SUPPOSITORY RECTAL
Qty: 1 EACH | Refills: 0 | Status: SHIPPED | OUTPATIENT
Start: 2025-03-21

## 2025-03-21 NOTE — TELEPHONE ENCOUNTER
Caller: Tara Perez    Relationship: Self    Best call back number:   Telephone Information:   Mobile [phone]     Requested Prescriptions:   Requested Prescriptions     Pending Prescriptions Disp Refills    Continuous Glucose Transmitter (Dexcom G6 Transmitter) misc 1 each 1     Sig: Use 1 each Every 3 (Three) Months.        Pharmacy where request should be sent:  [facility] [id number], [place] - [address] AT [facility], [place] [address] - [phone] [facility] [phone] FX     Last office visit with prescribing clinician: 10/16/2024   Last telemedicine visit with prescribing clinician: Visit date not found   Next office visit with prescribing clinician: 4/2/2025     Does the patient have less than a 3 day supply:  [] Yes  [x] No    Would you like a call back once the refill request has been completed: [] Yes [x] No    If the office needs to give you a call back, can they leave a voicemail: [] Yes [x] No    Radha Rodriguez Rep   03/21/25 10:13 EDT

## 2025-03-31 NOTE — PROGRESS NOTES
Chief complaint/Reason for consult: T2DM     HPI: Ms. Perez is a 64-year-old female with type 2 diabetes, history of breast cancer, hypertension, depression and mixed hyperlipidemia who comes for follow-up of type 2 diabetes.  She was last seen in this office on 10/16/2024 and reports since that time no significant changes in health, still with bothersome neuropathy.       # [id number], controlled with complications  # Diabetic retinopathy   - Diagnosed: around 2014   - Current regimen includes: 70/30 28 units with breakfast and 54 units with supper and Ozempic 2mg weekly   - Mounjaro caused a lot of GI issues, Ozempic is doing well   - Tried metformin in the past but had intolerable GI issues   - Had a lot of yeast infections so SGLT2 inhibitors were stopped, not interested in trying it again  - Compliance with medications is good, rarely misses any doses although she is late with her insulin injections sometimes with supper   - Eats one main meal per day (dinner), a smaller breakfast and has some snacks during the day   - She continues to have regular soda at dinner with her medications most days, not interested in cutting back  - Access to food is an issue due to her fixed income   - HbA1c: 6.3% 2/7/2025    - Using Dexcom G6      CGM Download  -Dates reviewed: 3/20/2025-4/2/2025  -Data: 89% wear time, average glucose 148 mg/dL, GMI 6.9%, coefficient of variation 27.4%, 1% very high, 20% high, 79% time in range, 0% low and less than 1% very low  -Interpretation: Overall good glycemic control with occasional borderline hypoglycemia fasting and mild postprandial hyperglycemia worse after supper     - Hypoglycemia occurs with prolonged fasting or when more active and not eating much   - Hyperglycemia occurs worse in the evening just after dinner  - 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), stable on gabapentin  - Patient denies gastroparesis   - Patient reports rotating injection sites   - Patient  "without known ASCVD   - taking ACEi; blood pressure today 118/76     DM Health Maintenance:  Ophtho: done 4/2024 and reports nonproliferative retinopathy that is stable, has follow-up later this month   Monofilament / Foot exam: abnormal, completed 6/12/2024, no reported foot sores  Lipids/Statin: taking a statin and Zetia with last FLP showing LDL 85 done 2/7/2025   RACHAEL:Cr Negative on 2/7/2025   TSH: 0.841 / FT4 0.78 done 2/7/2025   Aspirin: not taking      # Mixed hyperlipidemia  - Compliant with atorvastatin 80 mg daily and ezetimibe 10 mg daily, no reported side effects on these medications  - Lipids showing total cholesterol 171, HDL 55, LDL 85 and triglycerides 186 done 2/7/2025    Past medical history, past surgical history, family history and social history reviewed within this encounter.     Review of Systems   Constitutional:  Negative for activity change and unexpected weight change.   HENT:  Negative for trouble swallowing.    Eyes:  Negative for visual disturbance.   Respiratory:  Negative for shortness of breath.    Cardiovascular:  Negative for chest pain.   Gastrointestinal:  Negative for abdominal pain.   Endocrine: Negative for cold intolerance and heat intolerance.   Genitourinary:  Negative for dysuria.   Musculoskeletal:  Positive for arthralgias.   Skin:  Negative for rash.   Neurological:  Positive for numbness.   Psychiatric/Behavioral:  Negative for agitation.         /76   Pulse 83   Ht 162.6 cm (64\")   Wt 92.7 kg (204 lb 6.4 oz)   LMP  (LMP Unknown)   SpO2 98%   BMI 35.09 kg/m²      Physical Exam  Vitals reviewed.   Constitutional:       General: She is not in acute distress.     Appearance: She is obese.   HENT:      Head: Normocephalic.      Nose: Nose normal.   Eyes:      Conjunctiva/sclera: Conjunctivae normal.   Cardiovascular:      Rate and Rhythm: Normal rate.   Pulmonary:      Effort: Pulmonary effort is normal.   Abdominal:      Tenderness: There is no guarding. "   Skin:     General: Skin is warm and dry.   Neurological:      Mental Status: She is alert and oriented to person, place, and time.   Psychiatric:         Mood and Affect: Mood normal.          Labs and images reviewed as noted in the HPI    Assessment and plan:    Diagnoses and all orders for this visit:    1. Type 2 diabetes mellitus with both eyes affected by mild nonproliferative retinopathy without macular edema, with long-term current use of insulin (Primary)  Assessment & Plan:  -Overall her glycemic control is very good  -Complications include known diabetic retinopathy; additionally she has neuropathy which is likely from chemotherapy used to treat breast cancer  -Continue Ozempic 2.0 mg weekly and 70/30 20 units with breakfast and 54 units with supper; recommend reducing dose by 10 units if she has a smaller meal or plans to be very active  -Continue Dexcom CGM  -Continue ACEi; blood pressure today 118/76     DM Health Maintenance:  Ophtho: done 4/2024 and reports nonproliferative retinopathy that is stable, has follow-up later this month   Monofilament / Foot exam: abnormal, completed 6/12/2024, no reported foot sores  Lipids/Statin: taking a statin and Zetia with last FLP showing LDL 85 done 2/7/2025   RACHAEL:Cr Negative on 2/7/2025   TSH: 0.841 / FT4 0.78 done 2/7/2025   Aspirin: not taking     Orders:  -     Continuous Glucose Sensor (Dexcom G6 Sensor); Use Every 10 (Ten) Days.  Dispense: 9 each; Refill: 3  -     Continuous Glucose Transmitter (Dexcom G6 Transmitter) misc; Use 1 each Every 3 (Three) Months.  Dispense: 1 each; Refill: 0  -     insulin aspart prot & aspart (NovoLOG 70/30 FlexPen ReliOn) (70-30) 100 UNIT/ML suspension pen-injector injection; Inject 28u under the skin before breakfast and 54u under the skin before dinner, titrate dose as directed for max daily dose 100u  Dispense: 30 mL; Refill: 5  -     Insulin Pen Needle (B-D ULTRAFINE III SHORT PEN) 31G X 8 MM misc; Use as directed twice  daily  Dispense: 200 each; Refill: 3  -     Semaglutide, 2 MG/DOSE, (Ozempic, 2 MG/DOSE,) 8 MG/3ML solution pen-injector; Inject 2 mg under the skin into the appropriate area as directed 1 (One) Time Per Week.  Dispense: 9 mL; Refill: 3    2. Mixed hyperlipidemia  Assessment & Plan:  -LDL just slightly above goal, continue atorvastatin 80 mg daily and ezetimibe 10 mg daily  -Recommend reduction in dietary saturated fats  -If LDL remains elevated at next check may consider the addition of a PCSK9 inhibitor    Orders:  -     atorvastatin (LIPITOR) 80 MG tablet; Take 1 tablet by mouth Daily for 180 days.  Dispense: 90 tablet; Refill: 1  -     ezetimibe (ZETIA) 10 MG tablet; Take 1 tablet by mouth every night at bedtime.  Dispense: 90 tablet; Refill: 3    3. Low serum T4 level  Assessment & Plan:  -Clinically euthyroid  -Check TSH and total T3 today  -Check free T4 by equilibrium dialysis  -No obvious cause for central hypothyroidism at this time    Orders:  -     TSH  -     Free T4 By Dialysis / Mass Spec  -     T3         Return in about 6 months (around 10/2/2025) for T2DM.     Please note that portions of this note may have been completed with a voice recognition program. Efforts were made to edit the dictations, but occasionally words are mistranscribed.     Electronically signed by: Kyle S Rosenstein, MD

## 2025-04-02 ENCOUNTER — PRIOR AUTHORIZATION (OUTPATIENT)
Dept: ENDOCRINOLOGY | Facility: CLINIC | Age: 65
End: 2025-04-02
Payer: MEDICAID

## 2025-04-02 ENCOUNTER — OFFICE VISIT (OUTPATIENT)
Dept: ENDOCRINOLOGY | Facility: CLINIC | Age: 65
End: 2025-04-02
Payer: MEDICAID

## 2025-04-02 VITALS
HEART RATE: 83 BPM | WEIGHT: 204.4 LBS | SYSTOLIC BLOOD PRESSURE: 118 MMHG | OXYGEN SATURATION: 98 % | HEIGHT: 64 IN | DIASTOLIC BLOOD PRESSURE: 76 MMHG | BODY MASS INDEX: 34.89 KG/M2

## 2025-04-02 DIAGNOSIS — Z79.4 TYPE 2 DIABETES MELLITUS WITH BOTH EYES AFFECTED BY MILD NONPROLIFERATIVE RETINOPATHY WITHOUT MACULAR EDEMA, WITH LONG-TERM CURRENT USE OF INSULIN: Primary | ICD-10-CM

## 2025-04-02 DIAGNOSIS — E78.2 MIXED HYPERLIPIDEMIA: ICD-10-CM

## 2025-04-02 DIAGNOSIS — R79.89 LOW SERUM T4 LEVEL: ICD-10-CM

## 2025-04-02 DIAGNOSIS — E11.3293 TYPE 2 DIABETES MELLITUS WITH BOTH EYES AFFECTED BY MILD NONPROLIFERATIVE RETINOPATHY WITHOUT MACULAR EDEMA, WITH LONG-TERM CURRENT USE OF INSULIN: Primary | ICD-10-CM

## 2025-04-02 PROCEDURE — 3078F DIAST BP <80 MM HG: CPT | Performed by: HOSPITALIST

## 2025-04-02 PROCEDURE — 99214 OFFICE O/P EST MOD 30 MIN: CPT | Performed by: HOSPITALIST

## 2025-04-02 PROCEDURE — 3074F SYST BP LT 130 MM HG: CPT | Performed by: HOSPITALIST

## 2025-04-02 PROCEDURE — 95251 CONT GLUC MNTR ANALYSIS I&R: CPT | Performed by: HOSPITALIST

## 2025-04-02 PROCEDURE — 3044F HG A1C LEVEL LT 7.0%: CPT | Performed by: HOSPITALIST

## 2025-04-02 PROCEDURE — 1159F MED LIST DOCD IN RCRD: CPT | Performed by: HOSPITALIST

## 2025-04-02 PROCEDURE — 1160F RVW MEDS BY RX/DR IN RCRD: CPT | Performed by: HOSPITALIST

## 2025-04-02 RX ORDER — SEMAGLUTIDE 2.68 MG/ML
2 INJECTION, SOLUTION SUBCUTANEOUS WEEKLY
Qty: 9 ML | Refills: 3 | Status: SHIPPED | OUTPATIENT
Start: 2025-04-02

## 2025-04-02 RX ORDER — EZETIMIBE 10 MG/1
10 TABLET ORAL
Qty: 90 TABLET | Refills: 3 | Status: SHIPPED | OUTPATIENT
Start: 2025-04-02

## 2025-04-02 RX ORDER — PROCHLORPERAZINE 25 MG/1
1 SUPPOSITORY RECTAL
Qty: 1 EACH | Refills: 0 | Status: SHIPPED | OUTPATIENT
Start: 2025-04-02

## 2025-04-02 RX ORDER — ATORVASTATIN CALCIUM 80 MG/1
80 TABLET, FILM COATED ORAL DAILY
Qty: 90 TABLET | Refills: 1 | Status: SHIPPED | OUTPATIENT
Start: 2025-04-02 | End: 2025-09-29

## 2025-04-02 RX ORDER — INSULIN ASPART 100 [IU]/ML
INJECTION, SUSPENSION SUBCUTANEOUS
Qty: 30 ML | Refills: 5 | Status: SHIPPED | OUTPATIENT
Start: 2025-04-02

## 2025-04-02 RX ORDER — PROCHLORPERAZINE 25 MG/1
SUPPOSITORY RECTAL
Qty: 9 EACH | Refills: 3 | Status: SHIPPED | OUTPATIENT
Start: 2025-04-02

## 2025-04-02 RX ORDER — PEN NEEDLE, DIABETIC 31 GX5/16"
NEEDLE, DISPOSABLE MISCELLANEOUS
Qty: 200 EACH | Refills: 3 | Status: SHIPPED | OUTPATIENT
Start: 2025-04-02

## 2025-04-02 NOTE — ASSESSMENT & PLAN NOTE
-Clinically euthyroid  -Check TSH and total T3 today  -Check free T4 by equilibrium dialysis  -No obvious cause for central hypothyroidism at this time

## 2025-04-02 NOTE — ASSESSMENT & PLAN NOTE
-LDL just slightly above goal, continue atorvastatin 80 mg daily and ezetimibe 10 mg daily  -Recommend reduction in dietary saturated fats  -If LDL remains elevated at next check may consider the addition of a PCSK9 inhibitor

## 2025-04-02 NOTE — ASSESSMENT & PLAN NOTE
-Overall her glycemic control is very good  -Complications include known diabetic retinopathy; additionally she has neuropathy which is likely from chemotherapy used to treat breast cancer  -Continue Ozempic 2.0 mg weekly and 70/30 20 units with breakfast and 54 units with supper; recommend reducing dose by 10 units if she has a smaller meal or plans to be very active  -Continue Dexcom CGM  -Continue ACEi; blood pressure today 118/76     DM Health Maintenance:  Ophtho: done 4/2024 and reports nonproliferative retinopathy that is stable, has follow-up later this month   Monofilament / Foot exam: abnormal, completed 6/12/2024, no reported foot sores  Lipids/Statin: taking a statin and Zetia with last FLP showing LDL 85 done 2/7/2025   RACHAEL:Cr Negative on 2/7/2025   TSH: 0.841 / FT4 0.78 done 2/7/2025   Aspirin: not taking

## 2025-04-07 DIAGNOSIS — T45.1X5A CHEMOTHERAPY-INDUCED NEUROPATHY: ICD-10-CM

## 2025-04-07 DIAGNOSIS — G62.0 CHEMOTHERAPY-INDUCED NEUROPATHY: ICD-10-CM

## 2025-04-07 NOTE — TELEPHONE ENCOUNTER
Caller: Tara Perez    Relationship: Self    Best call back number: [phone]    Requested Prescriptions:   Requested Prescriptions     Pending Prescriptions Disp Refills    cyclobenzaprine (FLEXERIL) 10 MG tablet 90 tablet 3     Sig: Take 1 tablet by mouth Every Night.    carBAMazepine (TEGretol) 100 MG chewable tablet 270 tablet 1     Sig: Chew 1 tablet 3 (Three) Times a Day.        Pharmacy where request should be sent: [facility] [id number] [address] AT [facility] [address] [facility] [phone] [facility] [phone] FX     Last office visit with prescribing clinician: 2/7/2025   Last telemedicine visit with prescribing clinician: Visit date not found   Next office visit with prescribing clinician: 8/14/2025     Additional details provided by patient: HAS A FEW DAYS (4)    Does the patient have less than a 3 day supply:  [] Yes  [x] No      Kristie Earl MA   04/07/25 10:24 EDT

## 2025-04-08 RX ORDER — CARBAMAZEPINE 100 MG/1
100 TABLET, CHEWABLE ORAL 3 TIMES DAILY
Qty: 270 TABLET | Refills: 1 | Status: SHIPPED | OUTPATIENT
Start: 2025-04-08

## 2025-04-08 RX ORDER — CYCLOBENZAPRINE HCL 10 MG
10 TABLET ORAL NIGHTLY
Qty: 90 TABLET | Refills: 3 | Status: SHIPPED | OUTPATIENT
Start: 2025-04-08

## 2025-04-11 LAB
T3 SERPL-MCNC: 132 NG/DL (ref 71–180)
T4 FREE SERPL DIALY-MCNC: 0.75 NG/DL
TSH SERPL DL<=0.005 MIU/L-ACNC: 0.7 UIU/ML (ref 0.45–4.5)

## 2025-04-25 ENCOUNTER — RESULTS FOLLOW-UP (OUTPATIENT)
Dept: ENDOCRINOLOGY | Facility: CLINIC | Age: 65
End: 2025-04-25
Payer: MEDICAID

## 2025-04-25 LAB
ACTH PLAS-MCNC: 19.9 PG/ML (ref 7.2–63.3)
CORTIS SERPL-MCNC: 6.7 UG/DL (ref 6.2–19.4)
IGF-I SERPL-MCNC: 161 NG/ML (ref 57–202)
PROLACTIN SERPL-MCNC: 12.2 NG/ML (ref 3.6–25.2)
T4 FREE SERPL-MCNC: 0.73 NG/DL (ref 0.82–1.77)
TSH SERPL DL<=0.005 MIU/L-ACNC: 0.87 UIU/ML (ref 0.45–4.5)

## 2025-04-30 ENCOUNTER — OFFICE VISIT (OUTPATIENT)
Dept: ENDOCRINOLOGY | Facility: CLINIC | Age: 65
End: 2025-04-30
Payer: MEDICAID

## 2025-04-30 VITALS
HEIGHT: 64 IN | DIASTOLIC BLOOD PRESSURE: 76 MMHG | SYSTOLIC BLOOD PRESSURE: 124 MMHG | HEART RATE: 67 BPM | BODY MASS INDEX: 34.86 KG/M2 | OXYGEN SATURATION: 97 % | WEIGHT: 204.2 LBS

## 2025-04-30 DIAGNOSIS — R79.89 LOW SERUM T4 LEVEL: Primary | ICD-10-CM

## 2025-04-30 PROCEDURE — 1160F RVW MEDS BY RX/DR IN RCRD: CPT | Performed by: HOSPITALIST

## 2025-04-30 PROCEDURE — 3074F SYST BP LT 130 MM HG: CPT | Performed by: HOSPITALIST

## 2025-04-30 PROCEDURE — 99214 OFFICE O/P EST MOD 30 MIN: CPT | Performed by: HOSPITALIST

## 2025-04-30 PROCEDURE — 3078F DIAST BP <80 MM HG: CPT | Performed by: HOSPITALIST

## 2025-04-30 PROCEDURE — 1159F MED LIST DOCD IN RCRD: CPT | Performed by: HOSPITALIST

## 2025-04-30 PROCEDURE — 3044F HG A1C LEVEL LT 7.0%: CPT | Performed by: HOSPITALIST

## 2025-04-30 NOTE — PROGRESS NOTES
"Chief complaint/Reason for consult: Abnormal thyroid function tests    HPI: Ms. Perez is a 64-year-old female with type 2 diabetes, history of breast cancer, hypertension, depression and mixed hyperlipidemia who comes for follow-up.  She was last seen 4/2/2025 for type 2 diabetes and returns to the office today for follow-up of abnormal thyroid function test.    Labs reviewed:  4/2/2025  TSH 0.704   FT4 by ED 0.75   T3 132    4/23/2025  9:04 AM  Cortisol 6.7, ACTH 19.9  IGF-I 161  Prolactin 12.2  TSH 0.870, FT4 0.73    Patient reports intermittent fatigue  No significant cold intolerance  No changes in bowel movements  Reports a long history of abnormal thyroid function test, has never had additional workup done that she is aware of and has never been on thyroid hormone replacement    Past medical history, past surgical history, family history and social history reviewed within this encounter.     Review of Systems   Constitutional:  Positive for fatigue.   HENT:  Negative for trouble swallowing and voice change.    Eyes:  Negative for visual disturbance.   Respiratory:  Negative for shortness of breath.    Cardiovascular:  Negative for chest pain.   Gastrointestinal:  Negative for constipation.   Endocrine: Negative for cold intolerance.   Musculoskeletal:  Negative for gait problem.   Skin:  Negative for rash.   Neurological:  Positive for numbness.   Psychiatric/Behavioral:  Negative for agitation.         /76 (BP Location: Right arm, Patient Position: Sitting, Cuff Size: Adult)   Pulse 67   Ht 162.6 cm (64\")   Wt 92.6 kg (204 lb 3.2 oz)   LMP  (LMP Unknown)   SpO2 97%   BMI 35.05 kg/m²      Physical Exam  Vitals reviewed.   Constitutional:       General: She is not in acute distress.     Appearance: She is obese.   HENT:      Head: Normocephalic.      Nose: Nose normal.   Eyes:      Conjunctiva/sclera: Conjunctivae normal.   Neck:      Comments: No significant thyromegaly or discrete palpable " nodules  Cardiovascular:      Rate and Rhythm: Normal rate.      Pulses: Normal pulses.   Pulmonary:      Effort: Pulmonary effort is normal.   Abdominal:      Tenderness: There is no guarding.   Musculoskeletal:      Cervical back: Neck supple.   Skin:     General: Skin is warm and dry.   Neurological:      Mental Status: She is alert and oriented to person, place, and time.   Psychiatric:         Mood and Affect: Mood normal.        Labs and images reviewed as noted in the HPI    Assessment and plan:    Diagnoses and all orders for this visit:    1. Low serum T4 level (Primary)  Assessment & Plan:  -Persistently low FT4 in the setting of normal TSH and total T3  -Relatively asymptomatic although does have some occasional fatigue  -Needs additional workup for borderline early morning cortisol; repeat early morning cortisol and if it remains persistently borderline we will bring to [place] for a cosyntropin stimulation test  -Check MRI pituitary    Orders:  -     ACTH  -     Cortisol  -     MRI Pituitary With & Without Contrast; Future         Return in about 4 months (around 8/30/2025) for T2DM and abnormal TFTs.     Please note that portions of this note may have been completed with a voice recognition program. Efforts were made to edit the dictations, but occasionally words are mistranscribed.     Electronically signed by: Kyle S Rosenstein, MD

## 2025-04-30 NOTE — ASSESSMENT & PLAN NOTE
-Persistently low FT4 in the setting of normal TSH and total T3  -Relatively asymptomatic although does have some occasional fatigue  -Needs additional workup for borderline early morning cortisol; repeat early morning cortisol and if it remains persistently borderline we will bring to [place] for a cosyntropin stimulation test  -Check MRI pituitary

## 2025-05-02 ENCOUNTER — RESULTS FOLLOW-UP (OUTPATIENT)
Dept: ENDOCRINOLOGY | Facility: CLINIC | Age: 65
End: 2025-05-02
Payer: MEDICAID

## 2025-05-02 LAB
ACTH PLAS-MCNC: 54.2 PG/ML (ref 7.2–63.3)
CORTIS SERPL-MCNC: 14.3 UG/DL (ref 6.2–19.4)

## 2025-05-08 ENCOUNTER — TELEPHONE (OUTPATIENT)
Dept: ENDOCRINOLOGY | Facility: CLINIC | Age: 65
End: 2025-05-08
Payer: MEDICAID

## 2025-05-08 DIAGNOSIS — E11.3293 TYPE 2 DIABETES MELLITUS WITH BOTH EYES AFFECTED BY MILD NONPROLIFERATIVE RETINOPATHY WITHOUT MACULAR EDEMA, WITH LONG-TERM CURRENT USE OF INSULIN: ICD-10-CM

## 2025-05-08 DIAGNOSIS — Z79.4 TYPE 2 DIABETES MELLITUS WITH BOTH EYES AFFECTED BY MILD NONPROLIFERATIVE RETINOPATHY WITHOUT MACULAR EDEMA, WITH LONG-TERM CURRENT USE OF INSULIN: ICD-10-CM

## 2025-05-08 RX ORDER — PROCHLORPERAZINE 25 MG/1
SUPPOSITORY RECTAL
Qty: 9 EACH | Refills: 1 | Status: SHIPPED | OUTPATIENT
Start: 2025-05-08

## 2025-05-08 NOTE — TELEPHONE ENCOUNTER
Caller: Tara Perez    Relationship: Self    Best call back number:   Telephone Information:   Mobile [phone]     Requested Prescriptions:   Requested Prescriptions     Pending Prescriptions Disp Refills    Continuous Glucose Sensor (Dexcom G6 Sensor) 9 each 3     Sig: Use Every 10 (Ten) Days.        Pharmacy where request should be sent:  [facility] [id number] [address] AT [facility] [address] - [phone] [facility] [phone] FX     Last office visit with prescribing clinician: 4/30/2025   Last telemedicine visit with prescribing clinician: Visit date not found   Next office visit with prescribing clinician: 10/15/2025     Does the patient have less than a 3 day supply:  [x] Yes  [] No    Would you like a call back once the refill request has been completed: [] Yes [x] No    If the office needs to give you a call back, can they leave a voicemail: [] Yes [x] No    Radha Rodriguez Rep   05/08/25 10:15 EDT

## 2025-05-08 NOTE — TELEPHONE ENCOUNTER
Last office visit with prescribing clinician: 4/30/2025       Next office visit with prescribing clinician: 10/15/2025     {

## 2025-06-25 ENCOUNTER — TELEPHONE (OUTPATIENT)
Dept: ENDOCRINOLOGY | Facility: CLINIC | Age: 65
End: 2025-06-25
Payer: MEDICAID

## 2025-06-25 DIAGNOSIS — E11.3293 TYPE 2 DIABETES MELLITUS WITH BOTH EYES AFFECTED BY MILD NONPROLIFERATIVE RETINOPATHY WITHOUT MACULAR EDEMA, WITH LONG-TERM CURRENT USE OF INSULIN: ICD-10-CM

## 2025-06-25 DIAGNOSIS — Z79.4 TYPE 2 DIABETES MELLITUS WITH BOTH EYES AFFECTED BY MILD NONPROLIFERATIVE RETINOPATHY WITHOUT MACULAR EDEMA, WITH LONG-TERM CURRENT USE OF INSULIN: ICD-10-CM

## 2025-06-25 RX ORDER — PROCHLORPERAZINE 25 MG/1
1 SUPPOSITORY RECTAL
Qty: 1 EACH | Refills: 1 | Status: SHIPPED | OUTPATIENT
Start: 2025-06-25

## 2025-06-25 NOTE — TELEPHONE ENCOUNTER
Caller: Chris Tara    Relationship: Self    Best call back number:   Telephone Information:   Mobile [phone]        Requested Prescriptions:   Requested Prescriptions     Pending Prescriptions Disp Refills    Continuous Glucose Transmitter (Dexcom G6 Transmitter) misc 1 each 0     Sig: Use 1 each Every 3 (Three) Months.        Pharmacy where request should be sent:  [facility] [id number] - [place], [place] - [address] [facility] AT [facility] [address] - [phone] [facility] [phone] FX     Last office visit with prescribing clinician: 4/30/2025   Last telemedicine visit with prescribing clinician: Visit date not found   Next office visit with prescribing clinician: 10/15/2025     Additional details provided by patient: PT CALLED STATING SHE WAS MOWING THE LAWN AND PT TRANSMITTER POPPED OFF , PT ALSO STATING SHE HAS JURY DUTY STARTING JULY1ST AND WAS WANTING TO KNOW IF HAVING DEXCOM WILL MESS UP JURY DUTY. PLEASE ADVISE.     Does the patient have less than a 3 day supply:  [x] Yes  [] No    Would you like a call back once the refill request has been completed: [] Yes [x] No    If the office needs to give you a call back, can they leave a voicemail: [] Yes [x] No    Radha Rodriguez Rep   06/25/25 14:19 EDT   \

## 2025-07-30 ENCOUNTER — OFFICE VISIT (OUTPATIENT)
Dept: ONCOLOGY | Facility: CLINIC | Age: 65
End: 2025-07-30
Payer: MEDICAID

## 2025-07-30 VITALS
BODY MASS INDEX: 34.15 KG/M2 | RESPIRATION RATE: 18 BRPM | WEIGHT: 200 LBS | SYSTOLIC BLOOD PRESSURE: 134 MMHG | HEART RATE: 84 BPM | TEMPERATURE: 98.2 F | DIASTOLIC BLOOD PRESSURE: 73 MMHG | OXYGEN SATURATION: 96 % | HEIGHT: 64 IN

## 2025-07-30 DIAGNOSIS — Z85.3 HISTORY OF BILATERAL BREAST CANCER: Primary | Chronic | ICD-10-CM

## 2025-07-30 NOTE — PROGRESS NOTES
CHIEF COMPLAINT: Intermittent axillary pain    Problem List:  Oncology/Hematology History Overview Note   1.  History of bilateral breast cancer  2.  Hypertension  3.  Diabetes  4.  Hyperlipidemia  5.  Depression    Oncology history timeline:    -11/23/2021 office note from Dr.Vidya Zaidi indicates right breast T1BN0 low-grade ductal carcinoma estrogen receptor positive on Femara.  Left breast 12:00 and 7:00 hormone receptor positive T1CN0 adenocarcinoma.  Bilateral mastectomy.  Received dose dense AC followed by Taxol ending December 2014.  HER2/chitra negative.  ER and CA positive.  Hot flashes managed with clonidine 0.1 mg nightly.  Intolerant of aromatase inhibitor.  Plan for annual follow-up CBC, CMP and clinical exam.  -5/17/2022 CMP and CBC unremarkable    -7/12/2022 [facility] hematology oncology initial consultation: I saw her for the first time today.    I reviewed 5/20/2014 biopsy pathology report which reveals...   right breast low-grade invasive ductal carcinoma grade 1 Stanley Osman 5 out of 9.  ER 3+99%, CA 3+ 99%, HER2/chitra 1+ negative, Ki-67 borderline 3+12%.    Left breast mass 7:00 invasive ductal grade 2, Jen score 6 out of 9.  ER 3+100%, CA 13+ 100%, HER2/chitra equivocal negative on FISH.    Left breast 9:00 low-grade invasive ductal carcinoma grade 1 Jen score 5 out of 9.  ER 3+90% CA 3+100%   HER2/chitra negative    I reviewed her 6/5/2014 bilateral mastectomy pathology report...  right breast simple mastectomy showed intermediate grade invasive ductal carcinoma 1 cm grade 2 score 6 out of 9.  1 sentinel node negative.  No lymph vascular invasion.  Pathologic T1BN0  Left breast mastectomy 2 foci.  1.5 cm and 1.2 cm.  Grade 2.  Score 6 out of 9.  Margins negative.  1 sentinel node and 2 additional nodes taken all negative.  Pathologic T1CN0   Her CBC and CMP from May were unremarkable.  She confirms that she received dose dense AC followed by Taxol and then approximately 6 years of  "Femara which she quit in 2021 and now lives in [place].  She does not recall having had Oncotype.  Bilateral chest wall exam today is benign.  Routine follow-up at this point can be bilateral chest wall clinical examination and perhaps a CBC to look for late complications of chemotherapy including secondary malignancies.  This does not necessarily have to be done by a medical oncologist as a primary care doctor can examine her chest wall as a part of their routine physical and they can do her labs but she would desire to continue to follow with us as she was doing with her prior medical oncologist.  As breast cancer was multifocal in the left breast and present in the contralateral breast, I will offer her genetic counseling though obviously she has already had bilateral mastectomies.  With small node-negative disease I would not put her through breast cancer index testing and would not give her extended adjuvant hormone blockade.  She has been off of hormone blockade since the latter part of 2021.    -7/24/2024 [facility] oncology clinic follow-up: Tara looks well.  No evidence of recurrent breast cancer on clinical exam.  She has no swelling, no adenopathy, chest wall is benign on exam.  She reports intermittent swelling under her arms and what she describes as \"frogging\" in her chest wall which to the best of my ability I think she is saying she has some muscle spasms at times.  I discussed with her that I think she is having some intermittent lymphedema in the axillary region and upper chest wall.  She has been very active this summer, she push mows her lawn, she has a garden that she takes care of, she has been julia her tomatoes.  It has been quite hot.  I think all of this combined may lead to some mild lymphedema.  She has no other associated respiratory symptoms.  For now we will monitor and she understands to let us know if the symptoms worsen.  I have asked her to call if swelling returns and I will " get her worked in for an exam.  I did review her labs that were done with her PCP from 7/8/2024 and these were normal.  She is now 10 years out from completing chemotherapy portion of her cancer treatment, she has had bilateral mastectomies, she took hormonal blockade therapy for 6 years.     History of bilateral breast cancer       HISTORY OF PRESENT ILLNESS:  The patient is a 64 y.o. female, here for follow up on management of history of bilateral breast cancer.  Tara reports overall she has been doing well since we saw her last with no new concerns.  Continues to describe intermittent pain in her axillary region, this is not new and has been occurring since her mastectomy in 2014.  No swelling in her arms.  She denies any shortness of breath or cough.  She remains active mowing several lawns which she push mows.  She denies any change in her health or illnesses since we saw her last year.    Past Medical History:   Diagnosis Date    Mild nonproliferative diabetic retinopathy of both eyes without macular edema associated with type 2 diabetes mellitus 10/19/2022    Type 2 diabetes mellitus      Past Surgical History:   Procedure Laterality Date    APPENDECTOMY      MASTECTOMY      Double Mastectomy    TONSILLECTOMY AND ADENOIDECTOMY         Allergies   Allergen Reactions    Shingrix [Zoster Vac Recomb Adjuvanted] Itching    Jardiance [Empagliflozin] Itching    Penicillins Rash       Family History and Social History reviewed and changed as necessary    REVIEW OF SYSTEM:   Chronic chest and axillary discomfort since her mastectomy in 2014    PHYSICAL EXAM:  Well-developed, well-nourished healthy appearing female in no distress  No cervical, supraclavicular or axillary nodes palpable on exam  Chest wall exam is benign status post bilateral mastectomies, no abnormal masses, nodules, rashes or lesions  No lymphedema    Vitals:    07/30/25 1329   BP: 134/73   Pulse: 84   Resp: 18   Temp: 98.2 °F (36.8 °C)   SpO2:  "96%   Weight: 90.7 kg (200 lb)   Height: 162.6 cm (64\")     Vitals:    07/30/25 1329   PainSc: 0-No pain          ECOG score: 0           Vitals reviewed.    ECOG: (0) Fully Active - Able to Carry On All Pre-disease Performance Without Restriction    Lab Results   Component Value Date    HGB 14.3 02/07/2025    HCT 42.2 02/07/2025    MCV 91 02/07/2025     02/07/2025    WBC 7.2 02/07/2025    NEUTROABS 3.7 02/07/2025    LYMPHSABS 2.6 02/07/2025    MONOSABS 0.5 02/07/2025    EOSABS 0.4 02/07/2025    BASOSABS 0.1 02/07/2025       Lab Results   Component Value Date    GLUCOSE 82 02/07/2025    BUN 8 02/07/2025    CREATININE 0.78 02/07/2025     02/07/2025    K 3.9 02/07/2025     02/07/2025    CO2 24 02/07/2025    CALCIUM 9.3 02/07/2025    PROTEINTOT 7.5 02/07/2025    ALBUMIN 4.4 02/07/2025    BILITOT 0.3 02/07/2025    ALKPHOS 98 02/07/2025    AST 19 02/07/2025    ALT 12 02/07/2025             ASSESSMENT & PLAN:    1.  History of bilateral breast cancer  2.  Chronic intermittent postmastectomy pain    Discussion: Tara continues to do well with no evidence of recurrent breast cancer on clinical exam and no new worrisome symptoms.  She has chronic intermittent discomfort of the chest wall and axillary regions that seem to go back to the time of her mastectomy in 2014.  Nothing new on exam today.  We will continue to see her annually in survivorship mode.    Return to clinic in 1 year for follow-up    I spent 23 minutes caring for Tara on this date of service. This time includes time spent by me in the following activities: preparing for the visit, performing a medically appropriate examination and/or evaluation, counseling and educating the patient/family/caregiver, and documenting information in the medical record.     Danisha Ferro, APRN    07/30/2025      "

## 2025-08-06 DIAGNOSIS — G62.0 CHEMOTHERAPY-INDUCED NEUROPATHY: ICD-10-CM

## 2025-08-06 DIAGNOSIS — T45.1X5A CHEMOTHERAPY-INDUCED NEUROPATHY: ICD-10-CM

## 2025-08-06 RX ORDER — GABAPENTIN 400 MG/1
400 CAPSULE ORAL
Qty: 90 CAPSULE | Refills: 1 | OUTPATIENT
Start: 2025-08-06

## 2025-08-07 ENCOUNTER — LAB (OUTPATIENT)
Dept: FAMILY MEDICINE CLINIC | Facility: CLINIC | Age: 65
End: 2025-08-07
Payer: MEDICAID

## 2025-08-07 DIAGNOSIS — E78.2 MIXED HYPERLIPIDEMIA: ICD-10-CM

## 2025-08-07 DIAGNOSIS — G62.0 CHEMOTHERAPY-INDUCED NEUROPATHY: ICD-10-CM

## 2025-08-07 DIAGNOSIS — Z79.4 TYPE 2 DIABETES MELLITUS WITH BOTH EYES AFFECTED BY MILD NONPROLIFERATIVE RETINOPATHY WITHOUT MACULAR EDEMA, WITH LONG-TERM CURRENT USE OF INSULIN: ICD-10-CM

## 2025-08-07 DIAGNOSIS — T45.1X5A CHEMOTHERAPY-INDUCED NEUROPATHY: ICD-10-CM

## 2025-08-07 DIAGNOSIS — Z79.899 HIGH RISK MEDICATION USE: ICD-10-CM

## 2025-08-07 DIAGNOSIS — E55.9 VITAMIN D DEFICIENCY: ICD-10-CM

## 2025-08-07 DIAGNOSIS — E11.3293 TYPE 2 DIABETES MELLITUS WITH BOTH EYES AFFECTED BY MILD NONPROLIFERATIVE RETINOPATHY WITHOUT MACULAR EDEMA, WITH LONG-TERM CURRENT USE OF INSULIN: ICD-10-CM

## 2025-08-08 ENCOUNTER — TELEPHONE (OUTPATIENT)
Dept: FAMILY MEDICINE CLINIC | Facility: CLINIC | Age: 65
End: 2025-08-08
Payer: MEDICAID

## 2025-08-08 ENCOUNTER — RESULTS FOLLOW-UP (OUTPATIENT)
Dept: FAMILY MEDICINE CLINIC | Facility: CLINIC | Age: 65
End: 2025-08-08
Payer: MEDICAID

## 2025-08-08 DIAGNOSIS — T45.1X5A CHEMOTHERAPY-INDUCED NEUROPATHY: ICD-10-CM

## 2025-08-08 DIAGNOSIS — G62.0 CHEMOTHERAPY-INDUCED NEUROPATHY: ICD-10-CM

## 2025-08-08 LAB
25(OH)D3+25(OH)D2 SERPL-MCNC: 43.7 NG/ML (ref 30–100)
ALBUMIN SERPL-MCNC: 4.2 G/DL (ref 3.9–4.9)
ALP SERPL-CCNC: 83 IU/L (ref 44–121)
ALT SERPL-CCNC: 14 IU/L (ref 0–32)
AST SERPL-CCNC: 21 IU/L (ref 0–40)
BASOPHILS # BLD AUTO: 0 X10E3/UL (ref 0–0.2)
BASOPHILS NFR BLD AUTO: 1 %
BILIRUB SERPL-MCNC: 0.3 MG/DL (ref 0–1.2)
BUN SERPL-MCNC: 13 MG/DL (ref 8–27)
BUN/CREAT SERPL: 14 (ref 12–28)
CALCIUM SERPL-MCNC: 9.2 MG/DL (ref 8.7–10.3)
CHLORIDE SERPL-SCNC: 103 MMOL/L (ref 96–106)
CHOLEST SERPL-MCNC: 140 MG/DL (ref 100–199)
CK SERPL-CCNC: 179 U/L (ref 32–182)
CO2 SERPL-SCNC: 19 MMOL/L (ref 20–29)
CREAT SERPL-MCNC: 0.92 MG/DL (ref 0.57–1)
EGFRCR SERPLBLD CKD-EPI 2021: 70 ML/MIN/1.73
EOSINOPHIL # BLD AUTO: 0.4 X10E3/UL (ref 0–0.4)
EOSINOPHIL NFR BLD AUTO: 5 %
ERYTHROCYTE [DISTWIDTH] IN BLOOD BY AUTOMATED COUNT: 13.1 % (ref 11.7–15.4)
FOLATE SERPL-MCNC: >20 NG/ML
GLOBULIN SER CALC-MCNC: 2.8 G/DL (ref 1.5–4.5)
GLUCOSE SERPL-MCNC: 108 MG/DL (ref 70–99)
HBA1C MFR BLD: 6.2 % (ref 4.8–5.6)
HCT VFR BLD AUTO: 39.7 % (ref 34–46.6)
HDLC SERPL-MCNC: 47 MG/DL
HGB BLD-MCNC: 12.9 G/DL (ref 11.1–15.9)
IMM GRANULOCYTES # BLD AUTO: 0 X10E3/UL (ref 0–0.1)
IMM GRANULOCYTES NFR BLD AUTO: 0 %
LDLC SERPL CALC-MCNC: 67 MG/DL (ref 0–99)
LYMPHOCYTES # BLD AUTO: 2.7 X10E3/UL (ref 0.7–3.1)
LYMPHOCYTES NFR BLD AUTO: 41 %
MCH RBC QN AUTO: 31 PG (ref 26.6–33)
MCHC RBC AUTO-ENTMCNC: 32.5 G/DL (ref 31.5–35.7)
MCV RBC AUTO: 95 FL (ref 79–97)
MONOCYTES # BLD AUTO: 0.5 X10E3/UL (ref 0.1–0.9)
MONOCYTES NFR BLD AUTO: 7 %
NEUTROPHILS # BLD AUTO: 3 X10E3/UL (ref 1.4–7)
NEUTROPHILS NFR BLD AUTO: 46 %
PLATELET # BLD AUTO: 203 X10E3/UL (ref 150–450)
POTASSIUM SERPL-SCNC: 4.3 MMOL/L (ref 3.5–5.2)
PROT SERPL-MCNC: 7 G/DL (ref 6–8.5)
RBC # BLD AUTO: 4.16 X10E6/UL (ref 3.77–5.28)
SODIUM SERPL-SCNC: 137 MMOL/L (ref 134–144)
T4 FREE SERPL-MCNC: 0.74 NG/DL (ref 0.82–1.77)
TRIGL SERPL-MCNC: 149 MG/DL (ref 0–149)
TSH SERPL DL<=0.005 MIU/L-ACNC: 0.6 UIU/ML (ref 0.45–4.5)
VIT B12 SERPL-MCNC: 423 PG/ML (ref 232–1245)
VLDLC SERPL CALC-MCNC: 26 MG/DL (ref 5–40)
WBC # BLD AUTO: 6.6 X10E3/UL (ref 3.4–10.8)

## 2025-08-08 RX ORDER — GABAPENTIN 400 MG/1
400 CAPSULE ORAL
Qty: 30 CAPSULE | Refills: 0 | Status: SHIPPED | OUTPATIENT
Start: 2025-08-08

## 2025-08-14 ENCOUNTER — OFFICE VISIT (OUTPATIENT)
Dept: FAMILY MEDICINE CLINIC | Facility: CLINIC | Age: 65
End: 2025-08-14
Payer: MEDICAID

## 2025-08-14 VITALS
WEIGHT: 198 LBS | OXYGEN SATURATION: 98 % | BODY MASS INDEX: 33.8 KG/M2 | SYSTOLIC BLOOD PRESSURE: 108 MMHG | HEIGHT: 64 IN | HEART RATE: 79 BPM | DIASTOLIC BLOOD PRESSURE: 72 MMHG

## 2025-08-14 DIAGNOSIS — T45.1X5A CHEMOTHERAPY-INDUCED NEUROPATHY: ICD-10-CM

## 2025-08-14 DIAGNOSIS — G62.0 CHEMOTHERAPY-INDUCED NEUROPATHY: ICD-10-CM

## 2025-08-14 RX ORDER — GABAPENTIN 400 MG/1
400 CAPSULE ORAL
Qty: 30 CAPSULE | Refills: 5 | Status: SHIPPED | OUTPATIENT
Start: 2025-08-14

## 2025-08-14 RX ORDER — GABAPENTIN 400 MG/1
400 CAPSULE ORAL
Qty: 90 CAPSULE | Refills: 1 | Status: CANCELLED | OUTPATIENT
Start: 2025-08-14